# Patient Record
Sex: FEMALE | Race: WHITE | NOT HISPANIC OR LATINO | Employment: PART TIME | URBAN - METROPOLITAN AREA
[De-identification: names, ages, dates, MRNs, and addresses within clinical notes are randomized per-mention and may not be internally consistent; named-entity substitution may affect disease eponyms.]

---

## 2023-08-01 ENCOUNTER — OFFICE VISIT (OUTPATIENT)
Dept: FAMILY MEDICINE CLINIC | Facility: CLINIC | Age: 33
End: 2023-08-01
Payer: COMMERCIAL

## 2023-08-01 VITALS
OXYGEN SATURATION: 98 % | WEIGHT: 282 LBS | BODY MASS INDEX: 51.89 KG/M2 | HEIGHT: 62 IN | DIASTOLIC BLOOD PRESSURE: 77 MMHG | HEART RATE: 67 BPM | SYSTOLIC BLOOD PRESSURE: 116 MMHG | TEMPERATURE: 98.6 F

## 2023-08-01 DIAGNOSIS — Z13.1 DIABETES MELLITUS SCREENING: ICD-10-CM

## 2023-08-01 DIAGNOSIS — G89.29 CHRONIC BILATERAL LOW BACK PAIN WITHOUT SCIATICA: ICD-10-CM

## 2023-08-01 DIAGNOSIS — Z13.220 LIPID SCREENING: ICD-10-CM

## 2023-08-01 DIAGNOSIS — M54.50 CHRONIC BILATERAL LOW BACK PAIN WITHOUT SCIATICA: ICD-10-CM

## 2023-08-01 DIAGNOSIS — Z13.228 SCREENING FOR METABOLIC DISORDER: ICD-10-CM

## 2023-08-01 DIAGNOSIS — I10 BENIGN ESSENTIAL HTN: ICD-10-CM

## 2023-08-01 DIAGNOSIS — G43.911 INTRACTABLE MIGRAINE WITH STATUS MIGRAINOSUS, UNSPECIFIED MIGRAINE TYPE: ICD-10-CM

## 2023-08-01 DIAGNOSIS — Z76.89 ENCOUNTER TO ESTABLISH CARE: Primary | ICD-10-CM

## 2023-08-01 DIAGNOSIS — F31.9 BIPOLAR DEPRESSION (HCC): ICD-10-CM

## 2023-08-01 PROCEDURE — 99204 OFFICE O/P NEW MOD 45 MIN: CPT | Performed by: NURSE PRACTITIONER

## 2023-08-01 RX ORDER — CYCLOBENZAPRINE HCL 10 MG
10 TABLET ORAL 2 TIMES DAILY PRN
Qty: 30 TABLET | Refills: 1 | Status: SHIPPED | OUTPATIENT
Start: 2023-08-01

## 2023-08-01 RX ORDER — LISINOPRIL 40 MG/1
40 TABLET ORAL DAILY
Qty: 90 TABLET | Refills: 1 | Status: SHIPPED | OUTPATIENT
Start: 2023-08-01

## 2023-08-01 RX ORDER — HYDROCHLOROTHIAZIDE 25 MG/1
25 TABLET ORAL DAILY
COMMUNITY
End: 2023-08-01 | Stop reason: SDUPTHER

## 2023-08-01 RX ORDER — OLANZAPINE 5 MG/1
5 TABLET ORAL DAILY
COMMUNITY
End: 2023-08-01 | Stop reason: SDUPTHER

## 2023-08-01 RX ORDER — TOPIRAMATE 100 MG/1
100 TABLET, FILM COATED ORAL EVERY 12 HOURS SCHEDULED
Qty: 120 TABLET | Refills: 1 | Status: SHIPPED | OUTPATIENT
Start: 2023-08-01

## 2023-08-01 RX ORDER — CYCLOBENZAPRINE HCL 10 MG
10 TABLET ORAL 2 TIMES DAILY PRN
COMMUNITY
End: 2023-08-01 | Stop reason: SDUPTHER

## 2023-08-01 RX ORDER — ALBUTEROL SULFATE 90 UG/1
1 AEROSOL, METERED RESPIRATORY (INHALATION) EVERY 6 HOURS PRN
COMMUNITY

## 2023-08-01 RX ORDER — OLANZAPINE 5 MG/1
5 TABLET ORAL DAILY
Qty: 90 TABLET | Refills: 0 | Status: SHIPPED | OUTPATIENT
Start: 2023-08-01

## 2023-08-01 RX ORDER — LISINOPRIL 40 MG/1
40 TABLET ORAL DAILY
COMMUNITY
End: 2023-08-01 | Stop reason: SDUPTHER

## 2023-08-01 RX ORDER — HYDROCHLOROTHIAZIDE 25 MG/1
25 TABLET ORAL DAILY
Qty: 90 TABLET | Refills: 1 | Status: SHIPPED | OUTPATIENT
Start: 2023-08-01

## 2023-08-01 RX ORDER — TOPIRAMATE 50 MG/1
50 TABLET, FILM COATED ORAL EVERY 12 HOURS SCHEDULED
COMMUNITY
End: 2023-08-01 | Stop reason: DRUGHIGH

## 2023-08-01 NOTE — PATIENT INSTRUCTIONS
Continue medications as prescribed  F/up with your neurologist regarding migraines  Labs as ordered  Schedule appointment for physical  Call with problems/concerns

## 2023-08-01 NOTE — PROGRESS NOTES
[facility]        NAME: Bill Escobar is a 29 y.o. female  : 1990    MRN: [id number]  DATE: 2023  TIME: 4:53 PM    Assessment and Plan   Encounter to establish care [Z76.89]  1. Encounter to establish care        2. Diabetes mellitus screening  HEMOGLOBIN A1C W/ EAG ESTIMATION    HEMOGLOBIN A1C W/ EAG ESTIMATION      3. Lipid screening  Lipid panel    Lipid panel      4. Screening for metabolic disorder  Comprehensive metabolic panel    Comprehensive metabolic panel      5. Intractable migraine with status migrainosus, unspecified migraine type  topiramate (Topamax) 100 mg tablet      6. Bipolar depression (HCC)  OLANZapine (ZyPREXA) 5 mg tablet      7. Benign essential HTN  lisinopril (ZESTRIL) 40 mg tablet    hydrochlorothiazide (HYDRODIURIL) 25 mg tablet      8. Chronic bilateral low back pain without sciatica  cyclobenzaprine (FLEXERIL) 10 mg tablet            Patient Instructions     Patient Instructions   Continue medications as prescribed  F/up with your neurologist regarding migraines  Labs as ordered  Schedule appointment for physical  Call with problems/concerns          Chief Complaint     Chief Complaint   Patient presents with   • Establish Care   • Migraine         History of Present Illness       New patient to establish care. Previous PCP in [facility], [place], operated by [facility]. Patient has records with her. Hx of Migraines-she was seen by neurology in [place]-prescribed Topamax 75 mg bid. She needs to schedule a f/up appointment with them but she states the current dose does not help. Hx of Bipolar Depression currently taking Zyprexa 5 mg daily. Hx of asthma-using albuterol inhaler prn. Patient is looking for a psychiatrist. Needs refills. Hx of chronic low back pain-she takes Flexeril prn. She is due labs and physical.      Review of Systems   Review of Systems   Constitutional: Negative for activity change and fever.    Respiratory: Negative for chest tightness and shortness of breath. Cardiovascular: Negative for chest pain. Gastrointestinal: Negative for abdominal pain. Neurological: Positive for headaches. Negative for dizziness and weakness. Psychiatric/Behavioral: Negative for dysphoric mood. Current Medications       Current Outpatient Medications:   •  albuterol (PROVENTIL HFA,VENTOLIN HFA) 90 mcg/act inhaler, Inhale 1 puff every 6 (six) hours as needed, Disp: , Rfl:   •  cyclobenzaprine (FLEXERIL) 10 mg tablet, Take 1 tablet (10 mg total) by mouth 2 (two) times a day as needed for muscle spasms, Disp: 30 tablet, Rfl: 1  •  hydrochlorothiazide (HYDRODIURIL) 25 mg tablet, Take 1 tablet (25 mg total) by mouth daily, Disp: 90 tablet, Rfl: 1  •  lisinopril (ZESTRIL) 40 mg tablet, Take 1 tablet (40 mg total) by mouth daily, Disp: 90 tablet, Rfl: 1  •  OLANZapine (ZyPREXA) 5 mg tablet, Take 1 tablet (5 mg total) by mouth in the morning, Disp: 90 tablet, Rfl: 0  •  topiramate (Topamax) 100 mg tablet, Take 1 tablet (100 mg total) by mouth every 12 (twelve) hours, Disp: 120 tablet, Rfl: 1    Current Allergies     Allergies as of 2023   • (No Known Allergies)            The following portions of the patient's history were reviewed and updated as appropriate: allergies, current medications, past family history, past medical history, past social history, past surgical history and problem list.     Past Medical History:   Diagnosis Date   • Asthma    • Hypertension    • Migraine        Past Surgical History:   Procedure Laterality Date   •  SECTION     • CHOLECYSTECTOMY     • KNEE SURGERY         Family History   Problem Relation Age of Onset   • Hypertension Mother          Medications have been verified.         Objective   /77 (BP Location: Left arm, Patient Position: Sitting, Cuff Size: Standard)   Pulse 67   Temp 98.6 °F (37 °C) (Tympanic)   Ht 5' 2.25" (1.581 m)   Wt 128 kg (282 lb)   SpO2 98%   BMI 51.17 kg/m²        Physical Exam Physical Exam  Vitals and nursing note reviewed. Constitutional:       General: She is not in acute distress. Appearance: Normal appearance. She is obese. She is not ill-appearing. HENT:      Head: Normocephalic. Eyes:      Extraocular Movements: Extraocular movements intact. Cardiovascular:      Rate and Rhythm: Normal rate and regular rhythm. Heart sounds: Normal heart sounds. No murmur heard. No gallop. Pulmonary:      Effort: Pulmonary effort is normal. No respiratory distress. Breath sounds: Normal breath sounds. No wheezing. Skin:     General: Skin is warm and dry. Coloration: Skin is not pale. Findings: No erythema. Neurological:      Mental Status: She is alert and oriented to person, place, and time. Psychiatric:         Mood and Affect: Mood normal.         Behavior: Behavior normal.         Thought Content:  Thought content normal.         Judgment: Judgment normal.           PHQ-2/9 Depression Screening    Little interest or pleasure in doing things: 0 - not at all  Feeling down, depressed, or hopeless: 0 - not at all  PHQ-2 Score: 0  PHQ-2 Interpretation: Negative depression screen

## 2023-08-02 ENCOUNTER — TELEPHONE (OUTPATIENT)
Age: 33
End: 2023-08-02

## 2023-08-02 ENCOUNTER — TELEPHONE (OUTPATIENT)
Dept: PHYSICAL THERAPY | Facility: OTHER | Age: 33
End: 2023-08-02

## 2023-08-02 NOTE — TELEPHONE ENCOUNTER
Patient transferred by Ortho today 08/02. Patient left a v/m for CSP @9:56am.    Returned patient's call today 08/02 @9:58am.    Explained [facility] to the patient and reason for the call. Patient declined PT with advanced therapist. She also declined PM (pain management). Patient states she has gone through these route before without relief. I offered to transfer call back to Ortho and patient stated "I do have their number".      Patient will call Ortho office back to schedule an eval.

## 2023-08-02 NOTE — TELEPHONE ENCOUNTER
Patient called asking to speak to [address] [place] [address].  Provided number and warm transfer to [address] [place] [address].

## 2023-09-01 DIAGNOSIS — R73.03 PREDIABETES: ICD-10-CM

## 2023-09-01 DIAGNOSIS — E78.2 MIXED HYPERLIPIDEMIA: Primary | ICD-10-CM

## 2023-09-01 LAB
ALBUMIN SERPL-MCNC: 4.2 G/DL (ref 3.9–4.9)
ALBUMIN/GLOB SERPL: 1.4 {RATIO} (ref 1.2–2.2)
ALP SERPL-CCNC: 71 IU/L (ref 44–121)
ALT SERPL-CCNC: 15 IU/L (ref 0–32)
AST SERPL-CCNC: 14 IU/L (ref 0–40)
BILIRUB SERPL-MCNC: <0.2 MG/DL (ref 0–1.2)
BUN SERPL-MCNC: 10 MG/DL (ref 6–20)
BUN/CREAT SERPL: 16 (ref 9–23)
CALCIUM SERPL-MCNC: 9.7 MG/DL (ref 8.7–10.2)
CHLORIDE SERPL-SCNC: 95 MMOL/L (ref 96–106)
CHOLEST SERPL-MCNC: 223 MG/DL (ref 100–199)
CHOLEST/HDLC SERPL: 5.9 RATIO (ref 0–4.4)
CO2 SERPL-SCNC: 27 MMOL/L (ref 20–29)
CREAT SERPL-MCNC: 0.64 MG/DL (ref 0.57–1)
EGFR: 121 ML/MIN/1.73
EST. AVERAGE GLUCOSE BLD GHB EST-MCNC: 120 MG/DL
GLOBULIN SER-MCNC: 2.9 G/DL (ref 1.5–4.5)
GLUCOSE SERPL-MCNC: 120 MG/DL (ref 70–99)
HBA1C MFR BLD: 5.8 % (ref 4.8–5.6)
HDLC SERPL-MCNC: 38 MG/DL
LDLC SERPL CALC-MCNC: 141 MG/DL (ref 0–99)
POTASSIUM SERPL-SCNC: 3 MMOL/L (ref 3.5–5.2)
PROT SERPL-MCNC: 7.1 G/DL (ref 6–8.5)
SL AMB VLDL CHOLESTEROL CALC: 44 MG/DL (ref 5–40)
SODIUM SERPL-SCNC: 139 MMOL/L (ref 134–144)
TRIGL SERPL-MCNC: 245 MG/DL (ref 0–149)

## 2023-09-01 RX ORDER — ATORVASTATIN CALCIUM 10 MG/1
10 TABLET, FILM COATED ORAL DAILY
Qty: 90 TABLET | Refills: 1 | Status: SHIPPED | OUTPATIENT
Start: 2023-09-01

## 2023-09-05 ENCOUNTER — TELEPHONE (OUTPATIENT)
Dept: FAMILY MEDICINE CLINIC | Facility: CLINIC | Age: 33
End: 2023-09-05

## 2023-09-05 NOTE — TELEPHONE ENCOUNTER
----- Message from [address] [place] [address] sent at 9/1/2023 12:42 PM EDT -----  Fasting glucose is high, HA1C is in prediabetic range, cholesterol/triglycerides are high. Lifestyle modifications-healthy diet/exercise-decrease fats, carbs, sugars. Also Recommend starting statin therapy for high cholesterol-Atorvastatin 10 mg pb  y. Sent to pharmacy-take as directed. Repeat fasting labs in 3-6 months. Lab orders placed.

## 2023-09-10 ENCOUNTER — TELEPHONE (OUTPATIENT)
Dept: OTHER | Facility: OTHER | Age: 33
End: 2023-09-10

## 2023-09-10 NOTE — TELEPHONE ENCOUNTER
Patient called to see if there was opening for Monday 9/11 where she can move her Tuesday appointment up. Please give her a call back if there is - no openings on Monday that I notice on book it.

## 2023-09-11 ENCOUNTER — OFFICE VISIT (OUTPATIENT)
Dept: URGENT CARE | Facility: CLINIC | Age: 33
End: 2023-09-11
Payer: COMMERCIAL

## 2023-09-11 VITALS
TEMPERATURE: 100.7 F | RESPIRATION RATE: 18 BRPM | DIASTOLIC BLOOD PRESSURE: 52 MMHG | HEART RATE: 98 BPM | OXYGEN SATURATION: 97 % | SYSTOLIC BLOOD PRESSURE: 98 MMHG | BODY MASS INDEX: 50.8 KG/M2 | WEIGHT: 280 LBS

## 2023-09-11 DIAGNOSIS — J06.9 VIRAL URI WITH COUGH: Primary | ICD-10-CM

## 2023-09-11 DIAGNOSIS — R50.9 FEVER, UNSPECIFIED FEVER CAUSE: ICD-10-CM

## 2023-09-11 PROCEDURE — G0382 LEV 3 HOSP TYPE B ED VISIT: HCPCS

## 2023-09-11 RX ORDER — IBUPROFEN 400 MG/1
600 TABLET ORAL ONCE
Status: COMPLETED | OUTPATIENT
Start: 2023-09-11 | End: 2023-09-11

## 2023-09-11 RX ORDER — PREDNISONE 10 MG/1
TABLET ORAL
Qty: 15 TABLET | Refills: 0 | Status: SHIPPED | OUTPATIENT
Start: 2023-09-11 | End: 2023-09-15

## 2023-09-11 RX ORDER — PREDNISONE 10 MG/1
TABLET ORAL
Qty: 38 TABLET | Refills: 0 | Status: SHIPPED | OUTPATIENT
Start: 2023-09-11 | End: 2023-09-11

## 2023-09-11 RX ADMIN — IBUPROFEN 600 MG: 400 TABLET ORAL at 14:00

## 2023-09-11 NOTE — PROGRESS NOTES
[facility]        NAME: Sarkis Covarrubias is a 34 y.o. female  : 1990    MRN: [id number]  DATE: 2023  TIME: 2:09 PM    Assessment and Plan   Viral URI with cough [J06.9]  1. Viral URI with cough  predniSONE 10 mg tablet    DISCONTINUED: predniSONE 10 mg tablet      2. Fever, unspecified fever cause  ibuprofen (MOTRIN) tablet 600 mg            Patient Instructions     Your symptoms are consistent with a viral illness. Start prednisone as prescribed. For nasal/sinus congestion you can try steam, warm compresses, saline nasal spray, Neti pot, nasal steroid (Flonase, Nasocort), or nasal decongestant (Afrin - for 3 days only). You can try a decongestant (Sudafed) if > 10years of age and no history of high blood pressure. For cough you can take an over-the-counter expectorant such as plain Robitussion or Mucinex. A spoonful of honey at bedtime may also be helpful. For cold symptoms with high blood pressure take Coricidin cough/cold. For sore throat you can use Cepacol lozenges, do warm salt water gargles, drink warm water with lemon or herbal teas, or use an over-the-counter throat spray (Chloraseptic). You can take ibuprofen/Motrin and acetaminophen/Tylenol as needed for pain, fever, body aches. Do not take ibuprofen/Motrin/Advil if you have a history of heart disease, bleeding ulcers, or if you take blood thinners. Drink plenty of fluids to stay hydrated. Follow up with your PCP in 5-7 days for persistent symptoms. Go to the ER if symptoms worsen. Chief Complaint     Chief Complaint   Patient presents with   • Cold Like Symptoms     Pt states she was sick with nasal congestion and cough on Saturday. Negative at covid at home. Feels congested in chest.          History of Present Illness       This is a 68-year-old female who presents with cold symptoms x2 to 3 days.   Patient states she developed nasal congestion, rhinorrhea, and sinus pressure on Friday that persisted into Saturday. Yesterday developed chest congestion and a loose cough that kept her up all night long. Denies associated chest pain, SOB, or wheezing associated with the cough. No fevers/chills, abdominal pain, or N/V/D. Patient states she does have asthma hx with albuterol inhaler for as needed use, has not needed to use it during this illness. She is a daily cigarette smoker. Taking NyQuil, Robitussin, and Mucinex for symptom relief. Known sick contacts include her daughter with similar symptoms. Review of Systems   Review of Systems   Constitutional: Negative for chills, diaphoresis and fever. HENT: Positive for congestion, rhinorrhea and sinus pressure. Negative for ear pain and sore throat. Eyes: Negative for discharge and redness. Respiratory: Positive for cough. Negative for chest tightness, shortness of breath and wheezing. Cardiovascular: Negative for chest pain and palpitations. Gastrointestinal: Negative for abdominal pain, diarrhea, nausea and vomiting. Musculoskeletal: Negative for arthralgias and myalgias. Skin: Negative for pallor and rash. Neurological: Negative for dizziness, light-headedness and headaches.        Current Medications       Current Outpatient Medications:   •  albuterol (PROVENTIL HFA,VENTOLIN HFA) 90 mcg/act inhaler, Inhale 1 puff every 6 (six) hours as needed, Disp: , Rfl:   •  atorvastatin (LIPITOR) 10 mg tablet, Take 1 tablet (10 mg total) by mouth daily, Disp: 90 tablet, Rfl: 1  •  cyclobenzaprine (FLEXERIL) 10 mg tablet, Take 1 tablet (10 mg total) by mouth 2 (two) times a day as needed for muscle spasms, Disp: 30 tablet, Rfl: 1  •  hydrochlorothiazide (HYDRODIURIL) 25 mg tablet, Take 1 tablet (25 mg total) by mouth daily, Disp: 90 tablet, Rfl: 1  •  lisinopril (ZESTRIL) 40 mg tablet, Take 1 tablet (40 mg total) by mouth daily, Disp: 90 tablet, Rfl: 1  •  OLANZapine (ZyPREXA) 5 mg tablet, Take 1 tablet (5 mg total) by mouth in the morning, Disp: 90 tablet, Rfl: 0  •  predniSONE 10 mg tablet, Take 5 tablets (50 mg total) by mouth daily for 1 day, THEN 4 tablets (40 mg total) daily for 1 day, THEN 3 tablets (30 mg total) daily for 1 day, THEN 2 tablets (20 mg total) daily for 1 day, THEN 1 tablet (10 mg total) daily for 1 day., Disp: 15 tablet, Rfl: 0  •  topiramate (Topamax) 100 mg tablet, Take 1 tablet (100 mg total) by mouth every 12 (twelve) hours, Disp: 120 tablet, Rfl: 1  No current facility-administered medications for this visit. Current Allergies     Allergies as of 2023   • (No Known Allergies)            The following portions of the patient's history were reviewed and updated as appropriate: allergies, current medications, past family history, past medical history, past social history, past surgical history and problem list.     Past Medical History:   Diagnosis Date   • Asthma    • Hypertension    • Migraine        Past Surgical History:   Procedure Laterality Date   •  SECTION     • CHOLECYSTECTOMY     • KNEE SURGERY         Family History   Problem Relation Age of Onset   • Hypertension Mother          Medications have been verified. Objective   BP 98/52   Pulse 98   Temp (!) 100.7 °F (38.2 °C)   Resp 18   Wt 127 kg (280 lb)   SpO2 97%   BMI 50.80 kg/m²        Physical Exam     Physical Exam  Vitals and nursing note reviewed. Constitutional:       General: She is not in acute distress. Appearance: She is not ill-appearing or diaphoretic. HENT:      Head: Normocephalic. Right Ear: Tympanic membrane, ear canal and external ear normal.      Left Ear: Tympanic membrane, ear canal and external ear normal.      Nose: Congestion present. Mouth/Throat:      Mouth: Mucous membranes are moist.      Pharynx: Posterior oropharyngeal erythema present. No oropharyngeal exudate.       Comments: PND  Eyes:      Conjunctiva/sclera: Conjunctivae normal.   Cardiovascular:      Rate and Rhythm: Normal rate and regular rhythm. Pulses: Normal pulses. Heart sounds: Normal heart sounds. Pulmonary:      Effort: Pulmonary effort is normal.      Breath sounds: Normal breath sounds. No wheezing, rhonchi or rales. Comments: Strong, loose cough present. Musculoskeletal:         General: Normal range of motion. Cervical back: Normal range of motion and neck supple. Skin:     General: Skin is warm and dry. Capillary Refill: Capillary refill takes less than 2 seconds. Neurological:      Mental Status: She is alert and oriented to person, place, and time.

## 2023-09-11 NOTE — PATIENT INSTRUCTIONS
Your symptoms are consistent with a viral illness. Start prednisone as prescribed. For nasal/sinus congestion you can try steam, warm compresses, saline nasal spray, Neti pot, nasal steroid (Flonase, Nasocort), or nasal decongestant (Afrin - for 3 days only). You can try a decongestant (Sudafed) if > 10years of age and no history of high blood pressure. For cough you can take an over-the-counter expectorant such as plain Robitussion or Mucinex. A spoonful of honey at bedtime may also be helpful. For cold symptoms with high blood pressure take Coricidin cough/cold. For sore throat you can use Cepacol lozenges, do warm salt water gargles, drink warm water with lemon or herbal teas, or use an over-the-counter throat spray (Chloraseptic). You can take ibuprofen/Motrin and acetaminophen/Tylenol as needed for pain, fever, body aches. Do not take ibuprofen/Motrin/Advil if you have a history of heart disease, bleeding ulcers, or if you take blood thinners. Drink plenty of fluids to stay hydrated. Follow up with your PCP in 5-7 days for persistent symptoms. Go to the ER if symptoms worsen.

## 2023-09-12 ENCOUNTER — OFFICE VISIT (OUTPATIENT)
Dept: FAMILY MEDICINE CLINIC | Facility: CLINIC | Age: 33
End: 2023-09-12
Payer: COMMERCIAL

## 2023-09-12 VITALS
TEMPERATURE: 95.1 F | OXYGEN SATURATION: 96 % | DIASTOLIC BLOOD PRESSURE: 66 MMHG | BODY MASS INDEX: 50.08 KG/M2 | SYSTOLIC BLOOD PRESSURE: 96 MMHG | HEART RATE: 80 BPM | WEIGHT: 276 LBS

## 2023-09-12 DIAGNOSIS — Z00.00 ANNUAL PHYSICAL EXAM: Primary | ICD-10-CM

## 2023-09-12 PROCEDURE — 99395 PREV VISIT EST AGE 18-39: CPT | Performed by: NURSE PRACTITIONER

## 2023-09-12 NOTE — PATIENT INSTRUCTIONS
Labs reviewed  Healthy diet and exercise as discussed  Repeat fasting labs in 6 months    Wellness Visit for Adults   AMBULATORY CARE:   A wellness visit  is when you see your healthcare provider to get screened for health problems. Your healthcare provider will also give you advice on how to stay healthy. Write down your questions so you remember to ask them. Ask your healthcare provider how often you should have a wellness visit. What happens at a wellness visit:  Your healthcare provider will ask about your health, and your family history of health problems. This includes high blood pressure, heart disease, and cancer. He or she will ask if you have symptoms that concern you, if you smoke, and about your mood. You may also be asked about your intake of medicines, supplements, food, and alcohol. Any of the following may be done: Your weight  will be checked. Your height may also be checked so your body mass index (BMI) can be calculated. Your BMI shows if you are at a healthy weight. Your blood pressure  and heart rate will be checked. Your temperature may also be checked. Blood and urine tests  may be done. Blood tests may be done to check your cholesterol levels. Abnormal cholesterol levels increase your risk for heart disease and stroke. You may also need a blood or urine test to check for diabetes if you are at increased risk. Urine tests may be done to look for signs of an infection or kidney disease. A physical exam  includes checking your heartbeat and lungs with a stethoscope. Your healthcare provider may also check your skin to look for sun damage. Screening tests  may be recommended. A screening test is done to check for diseases that may not cause symptoms. The screening tests you may need depend on your age, gender, family history, and lifestyle habits. For example, colorectal screening may be recommended if you are 48years old or older.     Screening tests you need if you are a woman: A Pap smear  is used to screen for cervical cancer. Pap smears are usually done every 3 to 5 years depending on your age. You may need them more often if you have had abnormal Pap smear test results in the past. Ask your healthcare provider how often you should have a Pap smear. A mammogram  is an x-ray of your breasts to screen for breast cancer. Experts recommend mammograms every 2 years starting at age 48 years. You may need a mammogram at age 52 years or younger if you have an increased risk for breast cancer. Talk to your healthcare provider about when you should start having mammograms and how often you need them. Vaccines you may need:   Get an influenza vaccine  every year. The influenza vaccine protects you from the flu. Several types of viruses cause the flu. The viruses change over time, so new vaccines are made each year. Get a tetanus-diphtheria (Td) booster vaccine  every 10 years. This vaccine protects you against tetanus and diphtheria. Tetanus is a severe infection that may cause painful muscle spasms and lockjaw. Diphtheria is a severe bacterial infection that causes a thick covering in the back of your mouth and throat. Get a human papillomavirus (HPV) vaccine  if you are female and aged 23 to 32 or male 23 to 24 and never received it. This vaccine protects you from HPV infection. HPV is the most common infection spread by sexual contact. HPV may also cause vaginal, penile, and anal cancers. Get a pneumococcal vaccine  if you are aged 72 years or older. The pneumococcal vaccine is an injection given to protect you from pneumococcal disease. Pneumococcal disease is an infection caused by pneumococcal bacteria. The infection may cause pneumonia, meningitis, or an ear infection. Get a shingles vaccine  if you are 60 or older, even if you have had shingles before. The shingles vaccine is an injection to protect you from the varicella-zoster virus.  This is the same virus that causes chickenpox. Shingles is a painful rash that develops in people who had chickenpox or have been exposed to the virus. How to eat healthy:  My Plate is a model for planning healthy meals. It shows the types and amounts of foods that should go on your plate. Fruits and vegetables make up about half of your plate, and grains and protein make up the other half. A serving of dairy is included on the side of your plate. The amount of calories and serving sizes you need depends on your age, gender, weight, and height. Examples of healthy foods are listed below:  Eat a variety of vegetables  such as dark green, red, and orange vegetables. You can also include canned vegetables low in sodium (salt) and frozen vegetables without added butter or sauces. Eat a variety of fresh fruits , canned fruit in 100% juice, frozen fruit, and dried fruit. Include whole grains. At least half of the grains you eat should be whole grains. Examples include whole-wheat bread, wheat pasta, brown rice, and whole-grain cereals such as oatmeal.    Eat a variety of protein foods such as seafood (fish and shellfish), lean meat, and poultry without skin (turkey and chicken). Examples of lean meats include pork leg, shoulder, or tenderloin, and beef round, sirloin, tenderloin, and extra lean ground beef. Other protein foods include eggs and egg substitutes, beans, peas, soy products, nuts, and seeds. Choose low-fat dairy products such as skim or 1% milk or low-fat yogurt, cheese, and cottage cheese. Limit unhealthy fats  such as butter, hard margarine, and shortening. Exercise:  Exercise at least 30 minutes per day on most days of the week. Some examples of exercise include walking, biking, dancing, and swimming. You can also fit in more physical activity by taking the stairs instead of the elevator or parking farther away from stores. Include muscle strengthening activities 2 days each week.  Regular exercise provides many health benefits. It helps you manage your weight, and decreases your risk for type 2 diabetes, heart disease, stroke, and high blood pressure. Exercise can also help improve your mood. Ask your healthcare provider about the best exercise plan for you. General health and safety guidelines:   Do not smoke. Nicotine and other chemicals in cigarettes and cigars can cause lung damage. Ask your healthcare provider for information if you currently smoke and need help to quit. E-cigarettes or smokeless tobacco still contain nicotine. Talk to your healthcare provider before you use these products. Limit alcohol. A drink of alcohol is 12 ounces of beer, 5 ounces of wine, or 1½ ounces of liquor. Lose weight, if needed. Being overweight increases your risk of certain health conditions. These include heart disease, high blood pressure, type 2 diabetes, and certain types of cancer. Protect your skin. Do not sunbathe or use tanning beds. Use sunscreen with a SPF 15 or higher. Apply sunscreen at least 15 minutes before you go outside. Reapply sunscreen every 2 hours. Wear protective clothing, hats, and sunglasses when you are outside. Drive safely. Always wear your seatbelt. Make sure everyone in your car wears a seatbelt. A seatbelt can save your life if you are in an accident. Do not use your cell phone when you are driving. This could distract you and cause an accident. Pull over if you need to make a call or send a text message. Practice safe sex. Use latex condoms if are sexually active and have more than one partner. Your healthcare provider may recommend screening tests for sexually transmitted infections (STIs). Wear helmets, lifejackets, and protective gear. Always wear a helmet when you ride a bike or motorcycle, go skiing, or play sports that could cause a head injury. Wear protective equipment when you play sports. Wear a lifejacket when you are on a boat or doing water sports.     © Copyright [employer] 2022 Information is for End User's use only and may not be sold, redistributed or otherwise used for commercial purposes. The above information is an  only. It is not intended as medical advice for individual conditions or treatments. Talk to your doctor, nurse or pharmacist before following any medical regimen to see if it is safe and effective for you.

## 2023-09-12 NOTE — PROGRESS NOTES
ADULT ANNUAL PHYSICAL  [address]    NAME: Sonny Villeda  AGE: 34 y.o. SEX: female  : 1990     DATE: 2023     Assessment and Plan:     Problem List Items Addressed This Visit    None  Visit Diagnoses     Annual physical exam    -  Primary          Immunizations and preventive care screenings were discussed with patient today. Appropriate education was printed on patient's after visit summary. Counseling:  Alcohol/drug use: discussed moderation in alcohol intake, the recommendations for healthy alcohol use, and avoidance of illicit drug use. Dental Health: discussed importance of regular tooth brushing, flossing, and dental visits. Injury prevention: discussed safety/seat belts, safety helmets, smoke detectors, carbon dioxide detectors, and smoking near bedding or upholstery. Sexual health: discussed sexually transmitted diseases, partner selection, use of condoms, avoidance of unintended pregnancy, and contraceptive alternatives. · Exercise: the importance of regular exercise/physical activity was discussed. Recommend exercise 3-5 times per week for at least 30 minutes. BMI Counseling: Body mass index is 50.08 kg/m². The BMI is above normal. Nutrition recommendations include decreasing portion sizes, encouraging healthy choices of fruits and vegetables, moderation in carbohydrate intake and increasing intake of lean protein. Exercise recommendations include exercising 3-5 times per week. Rationale for BMI follow-up plan is due to patient being overweight or obese. Return in about 1 year (around 2024) for Annual physical.     Chief Complaint:     Chief Complaint   Patient presents with   • Physical Exam      History of Present Illness:     Adult Annual Physical   Patient here for a comprehensive physical exam. The patient reports no problems. Diet and Physical Activity  · Diet/Nutrition: well balanced diet. · Exercise: walking. Depression Screening  PHQ-2/9 Depression Screening    Little interest or pleasure in doing things: 0 - not at all  Feeling down, depressed, or hopeless: 0 - not at all  PHQ-2 Score: 0  PHQ-2 Interpretation: Negative depression screen       General Health  · Sleep: sleeps well. · Hearing: normal - bilateral.  · Vision: most recent eye exam >1 year ago and wears glasses. · Dental: regular dental visits. /GYN Health  · Last menstrual period: 23  · Contraceptive method: none. · History of STDs?: no.     Review of Systems:     Review of Systems   Constitutional: Negative for activity change, appetite change, fatigue and fever. HENT: Negative for congestion, sinus pressure and sore throat. Eyes: Negative for pain, discharge and visual disturbance. Respiratory: Negative for cough, chest tightness, shortness of breath and wheezing. Cardiovascular: Negative for chest pain, palpitations and leg swelling. Gastrointestinal: Negative for abdominal distention, abdominal pain, constipation, diarrhea and nausea. Endocrine: Negative for polydipsia, polyphagia and polyuria. Genitourinary: Negative for difficulty urinating, dysuria, frequency and urgency. Musculoskeletal: Negative for back pain, gait problem, joint swelling, myalgias and neck stiffness. Skin: Negative for color change. Neurological: Negative for dizziness, syncope, speech difficulty, weakness and headaches. Hematological: Negative for adenopathy. Does not bruise/bleed easily. Psychiatric/Behavioral: Negative for agitation, behavioral problems, confusion and hallucinations. The patient is not nervous/anxious.        Past Medical History:     Past Medical History:   Diagnosis Date   • Asthma    • Hypertension    • Migraine       Past Surgical History:     Past Surgical History:   Procedure Laterality Date   •  SECTION     • CHOLECYSTECTOMY     • KNEE SURGERY        Social History: Social History     Socioeconomic History   • Marital status: Single     Spouse name: None   • Number of children: None   • Years of education: None   • Highest education level: None   Occupational History   • None   Tobacco Use   • Smoking status: Former   • Smokeless tobacco: None   Substance and Sexual Activity   • Alcohol use: No   • Drug use: No   • Sexual activity: None   Other Topics Concern   • None   Social History Narrative   • None     Social Determinants of Health     Financial Resource Strain: Not on file   Food Insecurity: Not on file   Transportation Needs: Not on file   Physical Activity: Not on file   Stress: Not on file   Social Connections: Not on file   Intimate Partner Violence: Not on file   Housing Stability: Not on file      Family History:     Family History   Problem Relation Age of Onset   • Hypertension Mother       Current Medications:     Current Outpatient Medications   Medication Sig Dispense Refill   • albuterol (PROVENTIL HFA,VENTOLIN HFA) 90 mcg/act inhaler Inhale 1 puff every 6 (six) hours as needed     • atorvastatin (LIPITOR) 10 mg tablet Take 1 tablet (10 mg total) by mouth daily 90 tablet 1   • cyclobenzaprine (FLEXERIL) 10 mg tablet Take 1 tablet (10 mg total) by mouth 2 (two) times a day as needed for muscle spasms 30 tablet 1   • hydrochlorothiazide (HYDRODIURIL) 25 mg tablet Take 1 tablet (25 mg total) by mouth daily 90 tablet 1   • lisinopril (ZESTRIL) 40 mg tablet Take 1 tablet (40 mg total) by mouth daily 90 tablet 1   • OLANZapine (ZyPREXA) 5 mg tablet Take 1 tablet (5 mg total) by mouth in the morning 90 tablet 0   • predniSONE 10 mg tablet Take 5 tablets (50 mg total) by mouth daily for 1 day, THEN 4 tablets (40 mg total) daily for 1 day, THEN 3 tablets (30 mg total) daily for 1 day, THEN 2 tablets (20 mg total) daily for 1 day, THEN 1 tablet (10 mg total) daily for 1 day.  15 tablet 0   • topiramate (Topamax) 100 mg tablet Take 1 tablet (100 mg total) by mouth every 12 (twelve) hours 120 tablet 1     No current facility-administered medications for this visit. Allergies:     No Known Allergies   Physical Exam:     BP 96/66 (BP Location: Left arm, Patient Position: Sitting, Cuff Size: Standard)   Pulse 80   Temp (!) 95.1 °F (35.1 °C) (Tympanic)   Wt 125 kg (276 lb)   SpO2 96%   BMI 50.08 kg/m²     Physical Exam  Vitals and nursing note reviewed. Constitutional:       General: She is not in acute distress. Appearance: Normal appearance. She is obese. She is not ill-appearing, toxic-appearing or diaphoretic. HENT:      Head: Normocephalic. Right Ear: Tympanic membrane, ear canal and external ear normal. There is no impacted cerumen. Left Ear: Tympanic membrane, ear canal and external ear normal. There is no impacted cerumen. Nose: Nose normal. No congestion or rhinorrhea. Mouth/Throat:      Mouth: Mucous membranes are moist.      Pharynx: Oropharynx is clear. No oropharyngeal exudate or posterior oropharyngeal erythema. Eyes:      General: No scleral icterus. Right eye: No discharge. Left eye: No discharge. Extraocular Movements: Extraocular movements intact. Conjunctiva/sclera: Conjunctivae normal.      Pupils: Pupils are equal, round, and reactive to light. Neck:      Vascular: No carotid bruit. Cardiovascular:      Rate and Rhythm: Normal rate and regular rhythm. Pulses: Normal pulses. Heart sounds: Normal heart sounds. No murmur heard. No friction rub. No gallop. Pulmonary:      Effort: Pulmonary effort is normal. No respiratory distress. Breath sounds: Normal breath sounds. No stridor. No wheezing, rhonchi or rales. Chest:      Chest wall: No tenderness. Abdominal:      General: Abdomen is flat. Bowel sounds are normal. There is no distension. Palpations: Abdomen is soft. There is no mass. Tenderness: There is no abdominal tenderness.  There is no right CVA tenderness, left CVA tenderness, guarding or rebound. Hernia: No hernia is present. Musculoskeletal:         General: No swelling, tenderness, deformity or signs of injury. Normal range of motion. Cervical back: Normal range of motion and neck supple. No rigidity. No muscular tenderness. Right lower leg: No edema. Left lower leg: No edema. Lymphadenopathy:      Cervical: No cervical adenopathy. Skin:     General: Skin is warm. Coloration: Skin is not jaundiced or pale. Findings: No bruising, erythema, lesion or rash. Neurological:      General: No focal deficit present. Mental Status: She is alert and oriented to person, place, and time. Cranial Nerves: No cranial nerve deficit. Sensory: No sensory deficit. Motor: No weakness. Coordination: Coordination normal.      Gait: Gait normal.      Deep Tendon Reflexes: Reflexes normal.   Psychiatric:         Mood and Affect: Mood normal.         Behavior: Behavior normal.         Thought Content:  Thought content normal.         Judgment: Judgment normal.          Yenifer Ardon, [address] [facility] [address]

## 2023-10-02 ENCOUNTER — TELEPHONE (OUTPATIENT)
Dept: NEUROLOGY | Facility: CLINIC | Age: 33
End: 2023-10-02

## 2023-10-02 NOTE — TELEPHONE ENCOUNTER
Pt called today to get the date and time of her appointment. Advised appointment is on Monday 10/16/23 at 11am [place] in [facility]. Provided address of location as well.

## 2023-10-04 DIAGNOSIS — M54.50 CHRONIC BILATERAL LOW BACK PAIN WITHOUT SCIATICA: ICD-10-CM

## 2023-10-04 DIAGNOSIS — G89.29 CHRONIC BILATERAL LOW BACK PAIN WITHOUT SCIATICA: ICD-10-CM

## 2023-10-05 ENCOUNTER — TELEPHONE (OUTPATIENT)
Dept: BARIATRICS | Facility: CLINIC | Age: 33
End: 2023-10-05

## 2023-10-06 RX ORDER — CYCLOBENZAPRINE HCL 10 MG
10 TABLET ORAL 2 TIMES DAILY PRN
Qty: 30 TABLET | Refills: 0 | Status: SHIPPED | OUTPATIENT
Start: 2023-10-06

## 2023-10-24 DIAGNOSIS — F31.9 BIPOLAR DEPRESSION (HCC): ICD-10-CM

## 2023-10-24 RX ORDER — OLANZAPINE 5 MG/1
5 TABLET ORAL DAILY
Qty: 30 TABLET | Refills: 2 | Status: SHIPPED | OUTPATIENT
Start: 2023-10-24

## 2023-10-25 ENCOUNTER — TELEPHONE (OUTPATIENT)
Dept: NEUROLOGY | Facility: CLINIC | Age: 33
End: 2023-10-25

## 2023-10-25 DIAGNOSIS — Z04.9 OBSERVATION OR EVALUATION FOR SUSPECTED CONDITION: ICD-10-CM

## 2023-10-25 DIAGNOSIS — G35 MS (MULTIPLE SCLEROSIS) (HCC): Primary | ICD-10-CM

## 2023-10-25 DIAGNOSIS — G89.29 CHRONIC BILATERAL LOW BACK PAIN WITHOUT SCIATICA: ICD-10-CM

## 2023-10-25 DIAGNOSIS — M54.50 CHRONIC BILATERAL LOW BACK PAIN WITHOUT SCIATICA: ICD-10-CM

## 2023-10-25 NOTE — TELEPHONE ENCOUNTER
Braulio Singh called today to schedule NP appt for MS symptoms. PT feels like she has MS just like other family members. I triaged the pt and sent over to provider. Advised PT of process. Pt is aware that with the type of insurance she has - a PCP referral is required in order to sche appt. PT understood and will call PCP to fax over. Fax # was provided.

## 2023-10-25 NOTE — TELEPHONE ENCOUNTER
Medication filled 2 weeks ago. Patient should f/up with pain management and physical therapy for ongoing back pain.

## 2023-10-26 RX ORDER — CYCLOBENZAPRINE HCL 10 MG
10 TABLET ORAL 2 TIMES DAILY PRN
Qty: 30 TABLET | Refills: 0 | OUTPATIENT
Start: 2023-10-26

## 2023-11-03 ENCOUNTER — OFFICE VISIT (OUTPATIENT)
Dept: FAMILY MEDICINE CLINIC | Facility: CLINIC | Age: 33
End: 2023-11-03
Payer: COMMERCIAL

## 2023-11-03 VITALS
OXYGEN SATURATION: 100 % | TEMPERATURE: 97.7 F | DIASTOLIC BLOOD PRESSURE: 84 MMHG | SYSTOLIC BLOOD PRESSURE: 126 MMHG | WEIGHT: 287 LBS | BODY MASS INDEX: 52.07 KG/M2 | HEART RATE: 69 BPM

## 2023-11-03 DIAGNOSIS — J20.9 ACUTE BRONCHITIS, UNSPECIFIED ORGANISM: Primary | ICD-10-CM

## 2023-11-03 PROCEDURE — 99213 OFFICE O/P EST LOW 20 MIN: CPT | Performed by: NURSE PRACTITIONER

## 2023-11-03 RX ORDER — BENZONATATE 200 MG/1
200 CAPSULE ORAL 3 TIMES DAILY PRN
Qty: 20 CAPSULE | Refills: 0 | Status: SHIPPED | OUTPATIENT
Start: 2023-11-03

## 2023-11-03 RX ORDER — AZITHROMYCIN 250 MG/1
TABLET, FILM COATED ORAL
Qty: 6 TABLET | Refills: 0 | Status: SHIPPED | OUTPATIENT
Start: 2023-11-03 | End: 2023-11-08

## 2023-11-03 NOTE — TELEPHONE ENCOUNTER
1ST ATTEMPT    Patient is active on the My Chart      Sent a portal message we are ready to schedule      Provided our CB # for scheduling

## 2023-11-03 NOTE — PROGRESS NOTES
[facility]        NAME: Teena Perkins is a 34 y.o. female  : 1990    MRN: [id number]  DATE: November 3, 2023  TIME: 1:00 PM    Assessment and Plan   Acute bronchitis, unspecified organism [J20.9]  1. Acute bronchitis, unspecified organism  azithromycin (Zithromax) 250 mg tablet    benzonatate (TESSALON) 200 MG capsule            Patient Instructions     Patient Instructions   Discussed viral vs bacterial infection  RX as ordered  Continue OTC cough/cold medications as directed  Call or return for problems/concerns        Chief Complaint     Chief Complaint   Patient presents with    Sore Throat    Cough     Chest pain from coughing         History of Present Illness       C/o cough, chest congestion, sore throat x 2-3 day. No fever. Taking OTC cold medications with little relief. Review of Systems   Review of Systems   Constitutional:  Negative for activity change, chills, fatigue and fever. HENT:  Positive for congestion, postnasal drip, rhinorrhea and sore throat. Negative for ear pain and sinus pressure. Eyes:  Negative for pain, discharge and redness. Respiratory:  Positive for cough and chest tightness. Negative for wheezing. Cardiovascular:  Negative for chest pain. Gastrointestinal:  Negative for constipation, diarrhea, nausea and vomiting. Musculoskeletal:  Negative for myalgias. Skin:  Negative for rash. Neurological:  Negative for dizziness and headaches. Current Medications       Current Outpatient Medications:     albuterol (PROVENTIL HFA,VENTOLIN HFA) 90 mcg/act inhaler, Inhale 1 puff every 6 (six) hours as needed, Disp: , Rfl:     atorvastatin (LIPITOR) 10 mg tablet, Take 1 tablet (10 mg total) by mouth daily, Disp: 90 tablet, Rfl: 1    azithromycin (Zithromax) 250 mg tablet, Take 2 tablets (500 mg total) by mouth daily for 1 day, THEN 1 tablet (250 mg total) daily for 4 days. , Disp: 6 tablet, Rfl: 0    benzonatate (TESSALON) 200 MG capsule, Take 1 capsule (200 mg total) by mouth 3 (three) times a day as needed for cough, Disp: 20 capsule, Rfl: 0    cyclobenzaprine (FLEXERIL) 10 mg tablet, Take 1 tablet (10 mg total) by mouth 2 (two) times a day as needed for muscle spasms, Disp: 30 tablet, Rfl: 0    hydrochlorothiazide (HYDRODIURIL) 25 mg tablet, Take 1 tablet (25 mg total) by mouth daily, Disp: 90 tablet, Rfl: 1    lisinopril (ZESTRIL) 40 mg tablet, Take 1 tablet (40 mg total) by mouth daily, Disp: 90 tablet, Rfl: 1    OLANZapine (ZyPREXA) 5 mg tablet, TAKE 1 TABLET (5 MG TOTAL) BY MOUTH IN THE MORNING, Disp: 30 tablet, Rfl: 2    topiramate (Topamax) 100 mg tablet, Take 1 tablet (100 mg total) by mouth every 12 (twelve) hours, Disp: 120 tablet, Rfl: 1    Current Allergies     Allergies as of 2023    (No Known Allergies)            The following portions of the patient's history were reviewed and updated as appropriate: allergies, current medications, past family history, past medical history, past social history, past surgical history and problem list.     Past Medical History:   Diagnosis Date    Asthma     Hypertension     Migraine        Past Surgical History:   Procedure Laterality Date     SECTION      CHOLECYSTECTOMY      KNEE SURGERY         Family History   Problem Relation Age of Onset    Hypertension Mother          Medications have been verified. Objective   /84 (BP Location: Left arm, Patient Position: Sitting, Cuff Size: Standard)   Pulse 69   Temp 97.7 °F (36.5 °C) (Tympanic)   Wt 130 kg (287 lb)   SpO2 100%   BMI 52.07 kg/m²        Physical Exam     Physical Exam  Vitals and nursing note reviewed. Constitutional:       General: She is not in acute distress. Appearance: She is obese. She is ill-appearing. She is not toxic-appearing or diaphoretic. HENT:      Head: Normocephalic. Right Ear: Tympanic membrane, ear canal and external ear normal. There is no impacted cerumen. Left Ear: Tympanic membrane, ear canal and external ear normal. There is no impacted cerumen. Nose: Rhinorrhea present. Mouth/Throat:      Mouth: Mucous membranes are moist.      Pharynx: Oropharynx is clear. Posterior oropharyngeal erythema present. No oropharyngeal exudate. Eyes:      General:         Right eye: No discharge. Left eye: No discharge. Extraocular Movements: Extraocular movements intact. Conjunctiva/sclera: Conjunctivae normal.   Cardiovascular:      Rate and Rhythm: Normal rate and regular rhythm. Heart sounds: Normal heart sounds. No murmur heard. No friction rub. No gallop. Pulmonary:      Effort: No respiratory distress. Breath sounds: Rhonchi present. No wheezing. Comments: Cough present  Musculoskeletal:         General: Normal range of motion. Cervical back: Normal range of motion and neck supple. No rigidity or tenderness. Lymphadenopathy:      Cervical: No cervical adenopathy. Skin:     General: Skin is warm and dry. Coloration: Skin is not pale. Findings: No erythema. Neurological:      Mental Status: She is alert and oriented to person, place, and time.    Psychiatric:         Mood and Affect: Mood normal.         Behavior: Behavior normal.

## 2023-11-09 DIAGNOSIS — M54.50 CHRONIC BILATERAL LOW BACK PAIN WITHOUT SCIATICA: ICD-10-CM

## 2023-11-09 DIAGNOSIS — G89.29 CHRONIC BILATERAL LOW BACK PAIN WITHOUT SCIATICA: ICD-10-CM

## 2023-11-10 RX ORDER — CYCLOBENZAPRINE HCL 10 MG
10 TABLET ORAL 2 TIMES DAILY PRN
Qty: 30 TABLET | Refills: 0 | Status: SHIPPED | OUTPATIENT
Start: 2023-11-10

## 2023-11-17 ENCOUNTER — TELEPHONE (OUTPATIENT)
Dept: NEUROLOGY | Facility: CLINIC | Age: 33
End: 2023-11-17

## 2023-11-17 NOTE — TELEPHONE ENCOUNTER
Called spoke with patient confirmed appointment with Yao Avalos for 11/20/23 at [facility].

## 2023-11-19 ENCOUNTER — OFFICE VISIT (OUTPATIENT)
Dept: URGENT CARE | Facility: CLINIC | Age: 33
End: 2023-11-19
Payer: COMMERCIAL

## 2023-11-19 ENCOUNTER — APPOINTMENT (OUTPATIENT)
Dept: RADIOLOGY | Facility: CLINIC | Age: 33
End: 2023-11-19
Payer: COMMERCIAL

## 2023-11-19 VITALS
HEART RATE: 72 BPM | SYSTOLIC BLOOD PRESSURE: 127 MMHG | TEMPERATURE: 98 F | HEIGHT: 62 IN | WEIGHT: 285 LBS | RESPIRATION RATE: 18 BRPM | OXYGEN SATURATION: 100 % | DIASTOLIC BLOOD PRESSURE: 76 MMHG | BODY MASS INDEX: 52.44 KG/M2

## 2023-11-19 DIAGNOSIS — M25.572 LEFT ANKLE PAIN, UNSPECIFIED CHRONICITY: ICD-10-CM

## 2023-11-19 DIAGNOSIS — M76.72 PERONEAL TENDINITIS OF LEFT LOWER LEG: Primary | ICD-10-CM

## 2023-11-19 PROCEDURE — 73610 X-RAY EXAM OF ANKLE: CPT

## 2023-11-19 PROCEDURE — 99213 OFFICE O/P EST LOW 20 MIN: CPT | Performed by: FAMILY MEDICINE

## 2023-11-19 PROCEDURE — 73630 X-RAY EXAM OF FOOT: CPT

## 2023-11-19 NOTE — PROGRESS NOTES
[facility]        NAME: Sonny Villeda is a 34 y.o. female  : 1990    MRN: [id number]  DATE: 2023  TIME: 2:21 PM    Assessment and Plan   Left ankle pain, unspecified chronicity [M25.572]  1. Left ankle pain, unspecified chronicity  XR ankle 3+ vw left    XR foot 3+ vw left      2. Peroneal tendinitis of left lower leg              Patient Instructions       Follow up with PCP in 3-5 days. Proceed to  ER if symptoms worsen. Chief Complaint     Chief Complaint   Patient presents with    Ankle Pain     Pt is coming for left ankle pain that she had for roughly 3 weeks. Pt does not know how she hurt the ankle. When pt walks the pain level is about a 9 and when she is sitting down the pain level is about a 5. Pt has taken Advil to alleviate the pain, but it has not helped. Pt also says she has some pain in her foot specifically where the last three toes are located. History of Present Illness       79-year-old female presenting with left ankle pain. She reports a history of multiple sclerosis and states that she is unsure when this specific pain began. Pain is located along the lateral aspect of the ankle and radiates to the lateral aspect of her foot. Pain is reproduced with any attempted weightbearing. She has been using ibuprofen which has provided no relief. Denies numbness or tingling. Review of Systems   Review of Systems   Constitutional: Negative. HENT: Negative. Eyes: Negative. Respiratory: Negative. Cardiovascular: Negative. Gastrointestinal: Negative. Endocrine: Negative. Genitourinary: Negative. Musculoskeletal:  Positive for arthralgias and myalgias. Skin: Negative. Allergic/Immunologic: Negative. Neurological: Negative. Hematological: Negative. Psychiatric/Behavioral: Negative.            Current Medications       Current Outpatient Medications:     albuterol (PROVENTIL HFA,VENTOLIN HFA) 90 mcg/act inhaler, Inhale 1 puff every 6 (six) hours as needed, Disp: , Rfl:     atorvastatin (LIPITOR) 10 mg tablet, Take 1 tablet (10 mg total) by mouth daily, Disp: 90 tablet, Rfl: 1    benzonatate (TESSALON) 200 MG capsule, Take 1 capsule (200 mg total) by mouth 3 (three) times a day as needed for cough, Disp: 20 capsule, Rfl: 0    cyclobenzaprine (FLEXERIL) 10 mg tablet, Take 1 tablet (10 mg total) by mouth 2 (two) times a day as needed for muscle spasms, Disp: 30 tablet, Rfl: 0    hydrochlorothiazide (HYDRODIURIL) 25 mg tablet, Take 1 tablet (25 mg total) by mouth daily, Disp: 90 tablet, Rfl: 1    lisinopril (ZESTRIL) 40 mg tablet, Take 1 tablet (40 mg total) by mouth daily, Disp: 90 tablet, Rfl: 1    OLANZapine (ZyPREXA) 5 mg tablet, TAKE 1 TABLET (5 MG TOTAL) BY MOUTH IN THE MORNING, Disp: 30 tablet, Rfl: 2    topiramate (Topamax) 100 mg tablet, Take 1 tablet (100 mg total) by mouth every 12 (twelve) hours, Disp: 120 tablet, Rfl: 1    Current Allergies     Allergies as of 2023    (No Known Allergies)            The following portions of the patient's history were reviewed and updated as appropriate: allergies, current medications, past family history, past medical history, past social history, past surgical history and problem list.     Past Medical History:   Diagnosis Date    Asthma     Hypertension     Migraine        Past Surgical History:   Procedure Laterality Date     SECTION      CHOLECYSTECTOMY      KNEE SURGERY         Family History   Problem Relation Age of Onset    Hypertension Mother          Medications have been verified. Objective   /76   Pulse 72   Temp 98 °F (36.7 °C)   Resp 18   Ht 5' 2" (1.575 m)   Wt 129 kg (285 lb)   SpO2 100%   BMI 52.13 kg/m²   No LMP recorded. Physical Exam     Physical Exam  Vitals and nursing note reviewed. Constitutional:       Appearance: She is well-developed. HENT:      Head: Normocephalic.       Nose: Nose normal.   Eyes: Pupils: Pupils are equal, round, and reactive to light. Cardiovascular:      Rate and Rhythm: Normal rate. Pulmonary:      Effort: Pulmonary effort is normal.   Abdominal:      General: Abdomen is flat. Musculoskeletal:         General: Tenderness present. No swelling or signs of injury. Normal range of motion. Cervical back: Normal range of motion. Left ankle: Tenderness present. Normal range of motion. Left foot: Normal range of motion. Tenderness present. No swelling. Skin:     General: Skin is warm and dry. Neurological:      Mental Status: She is alert and oriented to person, place, and time.

## 2023-11-20 ENCOUNTER — CONSULT (OUTPATIENT)
Dept: NEUROLOGY | Facility: CLINIC | Age: 33
End: 2023-11-20
Payer: COMMERCIAL

## 2023-11-20 VITALS
WEIGHT: 284 LBS | HEART RATE: 65 BPM | DIASTOLIC BLOOD PRESSURE: 85 MMHG | BODY MASS INDEX: 52.26 KG/M2 | SYSTOLIC BLOOD PRESSURE: 176 MMHG | TEMPERATURE: 98.3 F | HEIGHT: 62 IN

## 2023-11-20 DIAGNOSIS — R90.82 WHITE MATTER ABNORMALITY ON MRI OF BRAIN: Primary | ICD-10-CM

## 2023-11-20 PROCEDURE — 99244 OFF/OP CNSLTJ NEW/EST MOD 40: CPT | Performed by: PSYCHIATRY & NEUROLOGY

## 2023-11-20 RX ORDER — LORAZEPAM 0.5 MG/1
0.5 TABLET ORAL AS NEEDED
Qty: 4 TABLET | Refills: 0 | Status: SHIPPED | OUTPATIENT
Start: 2023-11-20

## 2023-11-27 ENCOUNTER — OFFICE VISIT (OUTPATIENT)
Dept: FAMILY MEDICINE CLINIC | Facility: CLINIC | Age: 33
End: 2023-11-27
Payer: COMMERCIAL

## 2023-11-27 VITALS
TEMPERATURE: 97.1 F | HEART RATE: 72 BPM | BODY MASS INDEX: 52.6 KG/M2 | SYSTOLIC BLOOD PRESSURE: 130 MMHG | DIASTOLIC BLOOD PRESSURE: 80 MMHG | WEIGHT: 287.6 LBS

## 2023-11-27 DIAGNOSIS — R22.31 AXILLARY LUMP, RIGHT: Primary | ICD-10-CM

## 2023-11-27 PROCEDURE — 99213 OFFICE O/P EST LOW 20 MIN: CPT | Performed by: NURSE PRACTITIONER

## 2023-11-27 NOTE — PROGRESS NOTES
[facility]        NAME: Darcie Mcguire is a 34 y.o. female  : 1990    MRN: [id number]  DATE: 2023  TIME: 1:24 PM    Assessment and Plan   Axillary lump, right [R22.31]  1. Axillary lump, right  US axilla            Patient Instructions     Patient Instructions   Schedule Ultrasound right axilla  Call with problems/concerns        Chief Complaint     Chief Complaint   Patient presents with    lump underneath arm         History of Present Illness       C/o lump under right arm x 3 weeks. No pain. No other symptoms present. Review of Systems   Review of Systems   Constitutional:  Negative for activity change. HENT: Negative. Respiratory:  Negative for chest tightness, shortness of breath and wheezing. Cardiovascular:  Negative for chest pain. Gastrointestinal:  Negative for abdominal pain. Skin:  Negative for color change, pallor, rash and wound. Lump right armpit   Neurological:  Negative for headaches. Psychiatric/Behavioral:  Negative for dysphoric mood.           Current Medications       Current Outpatient Medications:     albuterol (PROVENTIL HFA,VENTOLIN HFA) 90 mcg/act inhaler, Inhale 1 puff every 6 (six) hours as needed, Disp: , Rfl:     atorvastatin (LIPITOR) 10 mg tablet, Take 1 tablet (10 mg total) by mouth daily, Disp: 90 tablet, Rfl: 1    cyclobenzaprine (FLEXERIL) 10 mg tablet, Take 1 tablet (10 mg total) by mouth 2 (two) times a day as needed for muscle spasms, Disp: 30 tablet, Rfl: 0    Diclofenac Sodium (Voltaren) 1 %, Apply 2 g topically 4 (four) times a day for 10 days, Disp: 80 g, Rfl: 0    lisinopril (ZESTRIL) 40 mg tablet, Take 1 tablet (40 mg total) by mouth daily, Disp: 90 tablet, Rfl: 1    LORazepam (Ativan) 0.5 mg tablet, Take 1 tablet (0.5 mg total) by mouth as needed for anxiety As the patient is claustrophobic I would recommend; one tablet of Lorazepam 15 minutes before going into the MRI tube and then 1 tablet just before going into the MRI tube. , Disp: 4 tablet, Rfl: 0    OLANZapine (ZyPREXA) 5 mg tablet, TAKE 1 TABLET (5 MG TOTAL) BY MOUTH IN THE MORNING, Disp: 30 tablet, Rfl: 2    topiramate (Topamax) 100 mg tablet, Take 1 tablet (100 mg total) by mouth every 12 (twelve) hours, Disp: 120 tablet, Rfl: 1    benzonatate (TESSALON) 200 MG capsule, Take 1 capsule (200 mg total) by mouth 3 (three) times a day as needed for cough (Patient not taking: Reported on 2023), Disp: 20 capsule, Rfl: 0    hydrochlorothiazide (HYDRODIURIL) 25 mg tablet, Take 1 tablet (25 mg total) by mouth daily (Patient not taking: Reported on 2023), Disp: 90 tablet, Rfl: 1    Current Allergies     Allergies as of 2023    (No Known Allergies)            The following portions of the patient's history were reviewed and updated as appropriate: allergies, current medications, past family history, past medical history, past social history, past surgical history and problem list.     Past Medical History:   Diagnosis Date    Asthma     Hypertension     Migraine        Past Surgical History:   Procedure Laterality Date     SECTION      CHOLECYSTECTOMY      KNEE SURGERY         Family History   Problem Relation Age of Onset    Hypertension Mother          Medications have been verified. Objective   /80 (BP Location: Left arm, Patient Position: Sitting, Cuff Size: Standard)   Pulse 72   Temp (!) 97.1 °F (36.2 °C) (Tympanic)   Wt 130 kg (287 lb 9.6 oz)   BMI 52.60 kg/m²        Physical Exam     Physical Exam  Vitals and nursing note reviewed. Constitutional:       General: She is not in acute distress. Appearance: Normal appearance. She is not ill-appearing. HENT:      Head: Normocephalic. Eyes:      Extraocular Movements: Extraocular movements intact. Cardiovascular:      Rate and Rhythm: Normal rate and regular rhythm. Heart sounds: Normal heart sounds.    Pulmonary:      Effort: Pulmonary effort is normal. No respiratory distress. Breath sounds: Normal breath sounds. No wheezing. Skin:     General: Skin is warm and dry. Findings: No erythema or rash. Comments: Pea-sized mass palpated right axilla, no tenderness, no redness or drainage. Neurological:      Mental Status: She is alert and oriented to person, place, and time. Psychiatric:         Mood and Affect: Mood normal.         Behavior: Behavior normal.               PHQ-2/9 Depression Screening    Little interest or pleasure in doing things: 0 - not at all  Feeling down, depressed, or hopeless: 0 - not at all  Trouble falling or staying asleep, or sleeping too much: 0 - not at all  Feeling tired or having little energy: 0 - not at all  Poor appetite or overeatin - not at all  Feeling bad about yourself - or that you are a failure or have let yourself or your family down: 0 - not at all  Trouble concentrating on things, such as reading the newspaper or watching television: 0 - not at all  Moving or speaking so slowly that other people could have noticed.  Or the opposite - being so fidgety or restless that you have been moving around a lot more than usual: 0 - not at all  Thoughts that you would be better off dead, or of hurting yourself in some way: 0 - not at all  PHQ-2 Score: 0  PHQ-2 Interpretation: Negative depression screen  PHQ-9 Score: 0   PHQ-9 Interpretation: No or Minimal depression

## 2023-11-29 DIAGNOSIS — G43.911 INTRACTABLE MIGRAINE WITH STATUS MIGRAINOSUS, UNSPECIFIED MIGRAINE TYPE: ICD-10-CM

## 2023-11-29 RX ORDER — TOPIRAMATE 100 MG/1
100 TABLET, FILM COATED ORAL EVERY 12 HOURS
Qty: 60 TABLET | Refills: 3 | Status: SHIPPED | OUTPATIENT
Start: 2023-11-29

## 2023-12-07 DIAGNOSIS — Z11.1 ENCOUNTER FOR PPD TEST: Primary | ICD-10-CM

## 2023-12-08 ENCOUNTER — TELEPHONE (OUTPATIENT)
Dept: FAMILY MEDICINE CLINIC | Facility: CLINIC | Age: 33
End: 2023-12-08

## 2023-12-08 LAB
CHOLEST SERPL-MCNC: 158 MG/DL (ref 100–199)
CHOLEST/HDLC SERPL: 4.1 RATIO (ref 0–4.4)
EST. AVERAGE GLUCOSE BLD GHB EST-MCNC: 103 MG/DL
HBA1C MFR BLD: 5.2 % (ref 4.8–5.6)
HDLC SERPL-MCNC: 39 MG/DL
LDLC SERPL CALC-MCNC: 92 MG/DL (ref 0–99)
SL AMB VLDL CHOLESTEROL CALC: 27 MG/DL (ref 5–40)
TRIGL SERPL-MCNC: 152 MG/DL (ref 0–149)

## 2023-12-08 NOTE — TELEPHONE ENCOUNTER
Pt aware     ----- Message from [address] [place] [address] sent at 12/8/2023  8:06 AM EST -----  Labs improved from previous. Continue healthy diet/exercise.

## 2023-12-09 LAB
GAMMA INTERFERON BACKGROUND BLD IA-ACNC: 0 IU/ML
M TB IFN-G CD4+ T-CELLS BLD-ACNC: 0.01 IU/ML
M TB IFN-G CD4+ T-CELLS BLD-ACNC: 0.03 IU/ML
MITOGEN IGNF BLD-ACNC: >10 IU/ML
QUANTIFERON INCUBATION COMMENT: NORMAL
QUANTIFERON-TB GOLD PLUS: NEGATIVE
SERVICE CMNT-IMP: NORMAL

## 2023-12-18 ENCOUNTER — TELEPHONE (OUTPATIENT)
Dept: FAMILY MEDICINE CLINIC | Facility: CLINIC | Age: 33
End: 2023-12-18

## 2023-12-18 ENCOUNTER — HOSPITAL ENCOUNTER (OUTPATIENT)
Dept: MRI IMAGING | Facility: HOSPITAL | Age: 33
Discharge: HOME | End: 2023-12-18
Attending: PSYCHIATRY & NEUROLOGY
Payer: COMMERCIAL

## 2023-12-18 DIAGNOSIS — R90.82 WHITE MATTER ABNORMALITY ON MRI OF BRAIN: ICD-10-CM

## 2023-12-18 PROCEDURE — A9585 GADOBUTROL INJECTION: HCPCS | Performed by: PSYCHIATRY & NEUROLOGY

## 2023-12-18 PROCEDURE — 70553 MRI BRAIN STEM W/O & W/DYE: CPT

## 2023-12-18 PROCEDURE — G1004 CDSM NDSC: HCPCS

## 2023-12-18 RX ORDER — GADOBUTROL 604.72 MG/ML
13 INJECTION INTRAVENOUS
Status: COMPLETED | OUTPATIENT
Start: 2023-12-18 | End: 2023-12-18

## 2023-12-18 RX ADMIN — GADOBUTROL 13 ML: 604.72 INJECTION INTRAVENOUS at 12:42

## 2023-12-18 NOTE — TELEPHONE ENCOUNTER
Pt aware    ----- Message from DIXIE Barbour sent at 12/18/2023 10:44 AM EST -----  Negative TB test

## 2023-12-29 DIAGNOSIS — M54.50 CHRONIC BILATERAL LOW BACK PAIN WITHOUT SCIATICA: ICD-10-CM

## 2023-12-29 DIAGNOSIS — G89.29 CHRONIC BILATERAL LOW BACK PAIN WITHOUT SCIATICA: ICD-10-CM

## 2023-12-29 DIAGNOSIS — R20.2 PARESTHESIA: Primary | ICD-10-CM

## 2023-12-29 NOTE — PROGRESS NOTES
1. Paresthesia    - EMG 1 Upper/1 Lower Neuropathy; Future        Oumar Berg MD.   Staff Neurologist  12/29/23   8:49 AM

## 2024-01-02 ENCOUNTER — OFFICE VISIT (OUTPATIENT)
Dept: URGENT CARE | Facility: CLINIC | Age: 34
End: 2024-01-02
Payer: COMMERCIAL

## 2024-01-02 VITALS
TEMPERATURE: 97.7 F | HEART RATE: 73 BPM | RESPIRATION RATE: 18 BRPM | OXYGEN SATURATION: 99 % | SYSTOLIC BLOOD PRESSURE: 155 MMHG | DIASTOLIC BLOOD PRESSURE: 92 MMHG

## 2024-01-02 DIAGNOSIS — J06.9 VIRAL URI WITH COUGH: Primary | ICD-10-CM

## 2024-01-02 PROCEDURE — 99213 OFFICE O/P EST LOW 20 MIN: CPT

## 2024-01-02 RX ORDER — BENZONATATE 200 MG/1
200 CAPSULE ORAL 3 TIMES DAILY PRN
Qty: 20 CAPSULE | Refills: 0 | Status: SHIPPED | OUTPATIENT
Start: 2024-01-02

## 2024-01-02 RX ORDER — FLUTICASONE PROPIONATE 50 MCG
1 SPRAY, SUSPENSION (ML) NASAL DAILY
Qty: 11.1 G | Refills: 0 | Status: SHIPPED | OUTPATIENT
Start: 2024-01-02

## 2024-01-02 NOTE — PROGRESS NOTES
[facility]        NAME: Jennifer Johnson is a 33 y.o. female  : 1990    MRN: [id number]  DATE: 2024  TIME: 10:55 AM    Assessment and Plan   Viral URI with cough [J06.9]  1. Viral URI with cough  benzonatate (TESSALON) 200 MG capsule    fluticasone (FLONASE) 50 mcg/act nasal spray            Patient Instructions   Start taking Flonase daily  Mucinex daily  Tessalon pearls needed for cough  Follow up with PCP in 3-5 days.  Proceed to  ER if symptoms worsen.    Chief Complaint     Chief Complaint   Patient presents with    Cold Like Symptoms     Pt states she has been sick with nasal congestion and cough since the week of . She was seen at [place], given prednisone and augmentin - did not help.         History of Present Illness       This is a 33-year-old female who presents with continued cough and congestion.  She started 4 weeks ago with an upper respiratory infection was seen at [facility].  She was prescribed amoxicillin and steroids on .  She states she is not feeling any better she continues with a cough and the mucus is clear.  She has nasal congestion and postnasal drip.  She denies any fever fatigue        Review of Systems   Review of Systems   Constitutional:  Negative for fatigue and fever.   HENT:  Positive for congestion and postnasal drip. Negative for sinus pressure, sinus pain and sore throat.    Eyes: Negative.    Respiratory:  Positive for cough. Negative for shortness of breath and wheezing.    Cardiovascular:  Negative for chest pain.   Gastrointestinal:  Negative for diarrhea, nausea and vomiting.   Genitourinary: Negative.    Musculoskeletal:  Negative for myalgias.   Neurological:  Negative for headaches.         Current Medications       Current Outpatient Medications:     albuterol (PROVENTIL HFA,VENTOLIN HFA) 90 mcg/act inhaler, Inhale 1 puff every 6 (six) hours as needed, Disp: , Rfl:     atorvastatin (LIPITOR) 10 mg tablet, Take 1 tablet  (10 mg total) by mouth daily, Disp: 90 tablet, Rfl: 1    benzonatate (TESSALON) 200 MG capsule, Take 1 capsule (200 mg total) by mouth 3 (three) times a day as needed for cough, Disp: 20 capsule, Rfl: 0    cyclobenzaprine (FLEXERIL) 10 mg tablet, Take 1 tablet (10 mg total) by mouth 2 (two) times a day as needed for muscle spasms, Disp: 30 tablet, Rfl: 0    fluticasone (FLONASE) 50 mcg/act nasal spray, 1 spray into each nostril daily, Disp: 11.1 g, Rfl: 0    lisinopril (ZESTRIL) 40 mg tablet, Take 1 tablet (40 mg total) by mouth daily, Disp: 90 tablet, Rfl: 1    LORazepam (Ativan) 0.5 mg tablet, Take 1 tablet (0.5 mg total) by mouth as needed for anxiety As the patient is claustrophobic I would recommend; one tablet of Lorazepam 15 minutes before going into the MRI tube and then 1 tablet just before going into the MRI tube., Disp: 4 tablet, Rfl: 0    OLANZapine (ZyPREXA) 5 mg tablet, TAKE 1 TABLET (5 MG TOTAL) BY MOUTH IN THE MORNING, Disp: 30 tablet, Rfl: 2    topiramate (TOPAMAX) 100 mg tablet, TAKE 1 TABLET BY MOUTH EVERY 12 HOURS, Disp: 60 tablet, Rfl: 3    benzonatate (TESSALON) 200 MG capsule, Take 1 capsule (200 mg total) by mouth 3 (three) times a day as needed for cough (Patient not taking: Reported on 11/20/2023), Disp: 20 capsule, Rfl: 0    Diclofenac Sodium (Voltaren) 1 %, Apply 2 g topically 4 (four) times a day for 10 days, Disp: 80 g, Rfl: 0    hydrochlorothiazide (HYDRODIURIL) 25 mg tablet, Take 1 tablet (25 mg total) by mouth daily (Patient not taking: Reported on 11/20/2023), Disp: 90 tablet, Rfl: 1    Current Allergies     Allergies as of 01/02/2024    (No Known Allergies)            The following portions of the patient's history were reviewed and updated as appropriate: allergies, current medications, past family history, past medical history, past social history, past surgical history and problem list.     Past Medical History:   Diagnosis Date    Asthma     Hypertension     Migraine         Past Surgical History:   Procedure Laterality Date     SECTION  2018    CHOLECYSTECTOMY  2012    KNEE SURGERY         Family History   Problem Relation Age of Onset    Hypertension Mother          Medications have been verified.        Objective   /92   Pulse 73   Temp 97.7 °F (36.5 °C)   Resp 18   SpO2 99%   No LMP recorded.       Physical Exam     Physical Exam  Constitutional:       Appearance: Normal appearance. She is normal weight.   HENT:      Head: Normocephalic and atraumatic.      Right Ear: Tympanic membrane, ear canal and external ear normal.      Left Ear: Tympanic membrane, ear canal and external ear normal.      Nose: Nose normal.      Mouth/Throat:      Mouth: Mucous membranes are moist.      Pharynx: Oropharynx is clear.   Eyes:      Conjunctiva/sclera: Conjunctivae normal.      Pupils: Pupils are equal, round, and reactive to light.   Cardiovascular:      Rate and Rhythm: Normal rate and regular rhythm.      Pulses: Normal pulses.      Heart sounds: Normal heart sounds.   Pulmonary:      Effort: Pulmonary effort is normal.      Breath sounds: Normal breath sounds. No wheezing, rhonchi or rales.   Abdominal:      General: Abdomen is flat. Bowel sounds are normal.      Tenderness: There is no abdominal tenderness.   Musculoskeletal:         General: Normal range of motion.      Cervical back: Normal range of motion. No tenderness.   Skin:     General: Skin is warm and dry.      Capillary Refill: Capillary refill takes less than 2 seconds.   Neurological:      General: No focal deficit present.      Mental Status: She is alert. Mental status is at baseline.   Psychiatric:         Mood and Affect: Mood normal.         Thought Content: Thought content normal.         Judgment: Judgment normal.

## 2024-01-03 RX ORDER — CYCLOBENZAPRINE HCL 10 MG
10 TABLET ORAL 2 TIMES DAILY PRN
Qty: 30 TABLET | Refills: 0 | Status: SHIPPED | OUTPATIENT
Start: 2024-01-03

## 2024-01-20 DIAGNOSIS — Z00.6 ENCOUNTER FOR EXAMINATION FOR NORMAL COMPARISON OR CONTROL IN CLINICAL RESEARCH PROGRAM: ICD-10-CM

## 2024-01-23 ENCOUNTER — OFFICE VISIT (OUTPATIENT)
Dept: GASTROENTEROLOGY | Facility: CLINIC | Age: 34
End: 2024-01-23
Payer: COMMERCIAL

## 2024-01-23 ENCOUNTER — APPOINTMENT (OUTPATIENT)
Dept: LAB | Facility: HOSPITAL | Age: 34
End: 2024-01-23
Payer: COMMERCIAL

## 2024-01-23 VITALS
WEIGHT: 293 LBS | SYSTOLIC BLOOD PRESSURE: 148 MMHG | DIASTOLIC BLOOD PRESSURE: 90 MMHG | HEIGHT: 62 IN | BODY MASS INDEX: 53.92 KG/M2

## 2024-01-23 DIAGNOSIS — Z00.6 ENCOUNTER FOR EXAMINATION FOR NORMAL COMPARISON OR CONTROL IN CLINICAL RESEARCH PROGRAM: ICD-10-CM

## 2024-01-23 DIAGNOSIS — R10.84 GENERALIZED ABDOMINAL PAIN: Primary | ICD-10-CM

## 2024-01-23 DIAGNOSIS — E66.01 OBESITY, MORBID, BMI 50 OR HIGHER (HCC): ICD-10-CM

## 2024-01-23 DIAGNOSIS — R19.7 DIARRHEA IN PEDIATRIC PATIENT: ICD-10-CM

## 2024-01-23 DIAGNOSIS — R19.7 DIARRHEA, UNSPECIFIED TYPE: ICD-10-CM

## 2024-01-23 LAB
ALBUMIN SERPL BCP-MCNC: 4.1 G/DL (ref 3.5–5)
ALP SERPL-CCNC: 61 U/L (ref 34–104)
ALT SERPL W P-5'-P-CCNC: 24 U/L (ref 7–52)
ANION GAP SERPL CALCULATED.3IONS-SCNC: 10 MMOL/L
AST SERPL W P-5'-P-CCNC: 20 U/L (ref 13–39)
BASOPHILS # BLD AUTO: 0.03 THOUSANDS/ÂΜL (ref 0–0.1)
BASOPHILS NFR BLD AUTO: 0 % (ref 0–1)
BILIRUB SERPL-MCNC: 0.25 MG/DL (ref 0.2–1)
BUN SERPL-MCNC: 9 MG/DL (ref 5–25)
CALCIUM SERPL-MCNC: 9.7 MG/DL (ref 8.4–10.2)
CHLORIDE SERPL-SCNC: 107 MMOL/L (ref 96–108)
CO2 SERPL-SCNC: 23 MMOL/L (ref 21–32)
CREAT SERPL-MCNC: 0.39 MG/DL (ref 0.6–1.3)
EOSINOPHIL # BLD AUTO: 0.21 THOUSAND/ÂΜL (ref 0–0.61)
EOSINOPHIL NFR BLD AUTO: 2 % (ref 0–6)
ERYTHROCYTE [DISTWIDTH] IN BLOOD BY AUTOMATED COUNT: 13.5 % (ref 11.6–15.1)
GFR SERPL CREATININE-BSD FRML MDRD: 140 ML/MIN/1.73SQ M
GLUCOSE P FAST SERPL-MCNC: 145 MG/DL (ref 65–99)
HCT VFR BLD AUTO: 38.2 % (ref 34.8–46.1)
HGB BLD-MCNC: 12.7 G/DL (ref 11.5–15.4)
IMM GRANULOCYTES # BLD AUTO: 0.05 THOUSAND/UL (ref 0–0.2)
IMM GRANULOCYTES NFR BLD AUTO: 1 % (ref 0–2)
LYMPHOCYTES # BLD AUTO: 2.37 THOUSANDS/ÂΜL (ref 0.6–4.47)
LYMPHOCYTES NFR BLD AUTO: 25 % (ref 14–44)
MCH RBC QN AUTO: 30.2 PG (ref 26.8–34.3)
MCHC RBC AUTO-ENTMCNC: 33.2 G/DL (ref 31.4–37.4)
MCV RBC AUTO: 91 FL (ref 82–98)
MONOCYTES # BLD AUTO: 0.42 THOUSAND/ÂΜL (ref 0.17–1.22)
MONOCYTES NFR BLD AUTO: 4 % (ref 4–12)
NEUTROPHILS # BLD AUTO: 6.37 THOUSANDS/ÂΜL (ref 1.85–7.62)
NEUTS SEG NFR BLD AUTO: 68 % (ref 43–75)
NRBC BLD AUTO-RTO: 0 /100 WBCS
PLATELET # BLD AUTO: 387 THOUSANDS/UL (ref 149–390)
PMV BLD AUTO: 9.7 FL (ref 8.9–12.7)
POTASSIUM SERPL-SCNC: 3.4 MMOL/L (ref 3.5–5.3)
PROT SERPL-MCNC: 7.1 G/DL (ref 6.4–8.4)
RBC # BLD AUTO: 4.21 MILLION/UL (ref 3.81–5.12)
SODIUM SERPL-SCNC: 140 MMOL/L (ref 135–147)
TSH SERPL DL<=0.05 MIU/L-ACNC: 1.01 UIU/ML (ref 0.45–4.5)
WBC # BLD AUTO: 9.45 THOUSAND/UL (ref 4.31–10.16)

## 2024-01-23 PROCEDURE — 86231 EMA EACH IG CLASS: CPT

## 2024-01-23 PROCEDURE — 82784 ASSAY IGA/IGD/IGG/IGM EACH: CPT

## 2024-01-23 PROCEDURE — 86364 TISS TRNSGLTMNASE EA IG CLAS: CPT

## 2024-01-23 PROCEDURE — 99203 OFFICE O/P NEW LOW 30 MIN: CPT | Performed by: INTERNAL MEDICINE

## 2024-01-23 PROCEDURE — 36415 COLL VENOUS BLD VENIPUNCTURE: CPT

## 2024-01-23 PROCEDURE — 86258 DGP ANTIBODY EACH IG CLASS: CPT

## 2024-01-23 PROCEDURE — 84443 ASSAY THYROID STIM HORMONE: CPT

## 2024-01-23 PROCEDURE — 85025 COMPLETE CBC W/AUTO DIFF WBC: CPT

## 2024-01-23 PROCEDURE — 80053 COMPREHEN METABOLIC PANEL: CPT

## 2024-01-23 NOTE — PROGRESS NOTES
[facility] - Outpatient Consultation  Jennifer Johnson 33 y.o. female MRN: [id number]  Encounter: [id number]    ASSESSMENT AND PLAN:    1. Generalized abdominal pain    2. Diarrhea, unspecified type  -     Giardia antigen; Future  -     Pancreatic elastase, fecal; Future  -     Stool Enteric Bacterial Panel by PCR; Future  -     Calprotectin,Fecal; Future  -     TSH, 3rd generation; Future  -     CBC and differential; Future  -     Celiac Disease Panel; Future  -     Clostridium difficile toxin by PCR with EIA; Future  -     Comprehensive metabolic panel; Future  -     Giardia antigen  -     Pancreatic elastase, fecal  -     Stool Enteric Bacterial Panel by PCR  -     Calprotectin,Fecal  -     TSH, 3rd generation  -     CBC and differential  -     Celiac Disease Panel  -     Clostridium difficile toxin by PCR with EIA  -     Comprehensive metabolic panel    3. Obesity, morbid, BMI 50 or higher (HCC)    Patient with a BMI of 54.  Continuing exercise dietary changes.    Diarrhea.  Patient with long history of diarrhea.  Will need to rule out infections, endocrinopathies, celiac, pancreatic insufficiency.  Will do stool and blood test as noted above.  Will also start fiber therapy.  If studies are unremarkable we will plan on colonoscopy to further evaluate for microscopic colitis              ---    Chief Complaint   Patient presents with   • Diarrhea     When pt eats meat is goes right through her,pain at times on left side upper, constant burping       HPI:   Jennifer Johnson is a 33 y.o. year old female with past medical history of bipolar, depression, and migraines, who is presenting for evaluation for diarrhea, also with a history of obesity.    The patient reports diarrhea when eating meat such as pork chops, chicken, and ground turkey. She occasionally experiences diarrhea even when consuming eggs. She is on a lean diet due to high cholesterol and is concerned about her protein  intake. She has a family history of gastrointestinal problems. She has tried protein powder, which did not help. She experiences 3 to 4 episodes of diarrhea daily. She denies nocturia. She denies hematochezia. She gets left upper abdominal pain which occasionally persists or lingers even after a bowel movement but may also get better at times. She experiences constant excessive eructation. She denies any nausea, vomiting, or heartburn. She occasionally experiences dysphagia and has started coughing. She is not on any over the counter medications for this. Her symptoms have been going on for a couple of months before her upper respiratory illness. She took antibiotics at that time and it did not exacerbate the situation. She denies having steatorrhea. She denies any family history of colon cancer or stomach cancers.    She had a liver function panel test in 2023, which was normal.         Answers submitted by the patient for this visit:  Abdominal Pain Questionnaire (Submitted on 2024)  Chief Complaint: Abdominal pain  Chronicity: chronic  Onset: 1 to 4 weeks ago  Onset quality: gradual  Frequency: constantly  Episode duration: 4 Hours  Progression since onset: gradually worsening  Pain location: generalized abdominal region  Pain - numeric: 10/10  Pain quality: aching, cramping, dull  Radiates to: LLQ, LUQ, RLQ  anorexia: No  belching: Yes  constipation: No  diarrhea: Yes  dysuria: No  fever: No  flatus: Yes  frequency: Yes  headaches: Yes  hematochezia: No  hematuria: No  melena: No  myalgias: No  nausea: No  weight loss: No  vomiting: No  Aggravated by: eating  Relieved by: belching, bowel movements      LAB/RADIOLOGY/ENDOSCOPY RESULTS:       Historical Information   Past Medical History:   Diagnosis Date   • Asthma    • Hypertension    • Migraine      Past Surgical History:   Procedure Laterality Date   •  SECTION     • CHOLECYSTECTOMY     • KNEE SURGERY       Social History  "    Substance and Sexual Activity   Alcohol Use No     Social History     Substance and Sexual Activity   Drug Use No     Social History     Tobacco Use   Smoking Status Former   • Current packs/day: 1.00   • Average packs/day: 1 pack/day for 20.0 years (20.0 ttl pk-yrs)   • Types: Cigarettes   Smokeless Tobacco Never     Family History   Problem Relation Age of Onset   • Hypertension Mother    • Asthma Mother    • ADD / ADHD Father        Meds/Allergies     Current Outpatient Medications:   •  albuterol (PROVENTIL HFA,VENTOLIN HFA) 90 mcg/act inhaler  •  atorvastatin (LIPITOR) 10 mg tablet  •  benzonatate (TESSALON) 200 MG capsule  •  cyclobenzaprine (FLEXERIL) 10 mg tablet  •  fluticasone (FLONASE) 50 mcg/act nasal spray  •  lisinopril (ZESTRIL) 40 mg tablet  •  LORazepam (Ativan) 0.5 mg tablet  •  OLANZapine (ZyPREXA) 5 mg tablet  •  topiramate (TOPAMAX) 100 mg tablet  •  benzonatate (TESSALON) 200 MG capsule  •  Diclofenac Sodium (Voltaren) 1 %  •  hydrochlorothiazide (HYDRODIURIL) 25 mg tablet  No Known Allergies    PHYSICAL EXAM:    Blood pressure 148/90, height 5' 2\" (1.575 m), weight 134 kg (296 lb), unknown if currently breastfeeding. Body mass index is 54.14 kg/m².  General Appearance: No apparent distress, cooperative, alert.  Eyes: Anicteric.  Gastrointestinal: Soft, non-tender, non-distended; normal bowel sounds; no masses, no organomegaly.  Rectal: Deferred.  Musculoskeletal: No edema.  Skin: No jaundice.    OTHER LAB RESULTS:   Lab Results   Component Value Date    WBC 8.49 03/24/2016    HGB 14.3 03/24/2016    HGB 14.4 03/24/2016    MCV 89 03/24/2016     (H) 03/24/2016     Lab Results   Component Value Date    K 3.0 (L) 08/31/2023    CL 95 (L) 08/31/2023    CO2 27 08/31/2023    BUN 10 08/31/2023    CREATININE 0.64 08/31/2023    GLUCOSE 99 03/24/2016    AST 14 08/31/2023    ALT 15 08/31/2023    EGFR 121 08/31/2023     No results found for: \"IRON\", \"TIBC\", \"FERRITIN\"  No results found for: " "\"LIPASE\"    OTHER RADIOLOGY RESULTS:   No results found.    Transcribed for Krishan Villeda MD, by Tiffany Young on 01/23/24 at 3:06 PM. Powered by [employer].  "

## 2024-01-23 NOTE — PATIENT INSTRUCTIONS
Fiber Supplementation Instructions     Insufficient fiber in the diet can lead to constipation which may result in the development of hemorrhoids and/or anal fissures. Increased fiber intake has been shown to reduce constipation by softening the stool and increasing the regularity of bowel movements. Soluble fiber (such as Benefiber®) is easy to incorporate into your diet as it is tasteless and can be mixed with food or drink. Begin with 1 tbsp once a day and then gradually increase to 1 tbsp 3 times a day over the period of a few days. Stir Benefiber® into 4-8 ounces of liquid (carbonated beverages are not recommended) or mix with soft food (hot or cold). Stir well until dissolved. Increase your fluid intake as necessary to ensure you are drinking 7-8 glasses of water every day. Supplemental fiber should be taken with meals for greatest benefit. Many less-expensive generic versions of Benefiber® are available with similar active components (pictured below)

## 2024-01-24 DIAGNOSIS — I10 BENIGN ESSENTIAL HTN: ICD-10-CM

## 2024-01-24 RX ORDER — LISINOPRIL 40 MG/1
40 TABLET ORAL DAILY
Qty: 90 TABLET | Refills: 1 | Status: CANCELLED | OUTPATIENT
Start: 2024-01-24

## 2024-01-25 ENCOUNTER — APPOINTMENT (OUTPATIENT)
Dept: LAB | Facility: HOSPITAL | Age: 34
End: 2024-01-25
Attending: INTERNAL MEDICINE
Payer: COMMERCIAL

## 2024-01-25 DIAGNOSIS — R19.7 DIARRHEA, UNSPECIFIED TYPE: Primary | ICD-10-CM

## 2024-01-25 DIAGNOSIS — K86.89 PANCREATIC INSUFFICIENCY: ICD-10-CM

## 2024-01-25 LAB
ENDOMYSIUM IGA SER QL: NEGATIVE
GLIADIN PEPTIDE IGA SER-ACNC: 4 UNITS (ref 0–19)
GLIADIN PEPTIDE IGG SER-ACNC: 2 UNITS (ref 0–19)
IGA SERPL-MCNC: 267 MG/DL (ref 87–352)
TTG IGA SER-ACNC: <2 U/ML (ref 0–3)
TTG IGG SER-ACNC: <2 U/ML (ref 0–5)

## 2024-01-25 PROCEDURE — 83993 ASSAY FOR CALPROTECTIN FECAL: CPT

## 2024-01-25 PROCEDURE — 82653 EL-1 FECAL QUANTITATIVE: CPT

## 2024-01-25 PROCEDURE — 87493 C DIFF AMPLIFIED PROBE: CPT

## 2024-01-25 PROCEDURE — 87505 NFCT AGENT DETECTION GI: CPT

## 2024-01-25 PROCEDURE — 87329 GIARDIA AG IA: CPT

## 2024-01-26 DIAGNOSIS — I10 BENIGN ESSENTIAL HTN: ICD-10-CM

## 2024-01-26 LAB
C COLI+JEJUNI TUF STL QL NAA+PROBE: NEGATIVE
C DIFF TOX GENS STL QL NAA+PROBE: NEGATIVE
EC STX1+STX2 GENES STL QL NAA+PROBE: NEGATIVE
G LAMBLIA AG STL QL IA: NEGATIVE
SALMONELLA SP SPAO STL QL NAA+PROBE: NEGATIVE
SHIGELLA SP+EIEC IPAH STL QL NAA+PROBE: NEGATIVE

## 2024-01-26 RX ORDER — LISINOPRIL 40 MG/1
40 TABLET ORAL DAILY
Qty: 90 TABLET | Refills: 1 | Status: SHIPPED | OUTPATIENT
Start: 2024-01-26

## 2024-01-30 LAB — CALPROTECTIN STL-MCNT: 6 UG/G (ref 0–120)

## 2024-01-31 LAB — ELASTASE PANC STL-MCNT: <50 UG ELAST./G

## 2024-02-01 ENCOUNTER — PATIENT MESSAGE (OUTPATIENT)
Dept: FAMILY MEDICINE CLINIC | Facility: CLINIC | Age: 34
End: 2024-02-01

## 2024-02-01 DIAGNOSIS — H92.03 DISCOMFORT OF BOTH EARS: Primary | ICD-10-CM

## 2024-02-01 RX ORDER — PANCRELIPASE 36000; 180000; 114000 [USP'U]/1; [USP'U]/1; [USP'U]/1
36000 CAPSULE, DELAYED RELEASE PELLETS ORAL
Qty: 90 CAPSULE | Refills: 1 | Status: SHIPPED | OUTPATIENT
Start: 2024-02-01

## 2024-02-13 DIAGNOSIS — E78.2 MIXED HYPERLIPIDEMIA: ICD-10-CM

## 2024-02-13 DIAGNOSIS — R73.03 PREDIABETES: ICD-10-CM

## 2024-02-13 RX ORDER — ATORVASTATIN CALCIUM 10 MG/1
10 TABLET, FILM COATED ORAL DAILY
Qty: 90 TABLET | Refills: 1 | Status: SHIPPED | OUTPATIENT
Start: 2024-02-13

## 2024-02-18 DIAGNOSIS — M54.50 CHRONIC BILATERAL LOW BACK PAIN WITHOUT SCIATICA: ICD-10-CM

## 2024-02-18 DIAGNOSIS — G89.29 CHRONIC BILATERAL LOW BACK PAIN WITHOUT SCIATICA: ICD-10-CM

## 2024-02-18 LAB
APOB+LDLR+PCSK9 GENE MUT ANL BLD/T: NOT DETECTED
BRCA1+BRCA2 DEL+DUP + FULL MUT ANL BLD/T: NOT DETECTED
MLH1+MSH2+MSH6+PMS2 GN DEL+DUP+FUL M: NOT DETECTED

## 2024-02-19 RX ORDER — CYCLOBENZAPRINE HCL 10 MG
10 TABLET ORAL 2 TIMES DAILY PRN
Qty: 30 TABLET | Refills: 0 | Status: SHIPPED | OUTPATIENT
Start: 2024-02-19

## 2024-02-21 PROBLEM — J06.9 VIRAL URI WITH COUGH: Status: RESOLVED | Noted: 2024-01-02 | Resolved: 2024-02-21

## 2024-02-22 ENCOUNTER — HOSPITAL ENCOUNTER (OUTPATIENT)
Dept: MAMMOGRAPHY | Facility: CLINIC | Age: 34
Discharge: HOME | End: 2024-02-22
Payer: COMMERCIAL

## 2024-02-22 ENCOUNTER — HOSPITAL ENCOUNTER (OUTPATIENT)
Dept: ULTRASOUND IMAGING | Facility: CLINIC | Age: 34
Discharge: HOME | End: 2024-02-22
Payer: COMMERCIAL

## 2024-02-22 VITALS — HEIGHT: 62 IN | WEIGHT: 293 LBS | BODY MASS INDEX: 53.92 KG/M2

## 2024-02-22 DIAGNOSIS — R22.31 LUMP OF AXILLA, RIGHT: ICD-10-CM

## 2024-02-22 DIAGNOSIS — R22.31 AXILLARY LUMP, RIGHT: ICD-10-CM

## 2024-02-22 PROCEDURE — 76642 ULTRASOUND BREAST LIMITED: CPT

## 2024-02-22 PROCEDURE — G0279 TOMOSYNTHESIS, MAMMO: HCPCS

## 2024-02-22 PROCEDURE — 77066 DX MAMMO INCL CAD BI: CPT

## 2024-02-23 ENCOUNTER — TELEPHONE (OUTPATIENT)
Dept: FAMILY MEDICINE CLINIC | Facility: CLINIC | Age: 34
End: 2024-02-23

## 2024-02-23 NOTE — TELEPHONE ENCOUNTER
----- Message from DIXIE Barbour sent at 2/23/2024  8:04 AM EST -----  Negative mammogram. Routine screening at age 42

## 2024-03-13 ENCOUNTER — APPOINTMENT (OUTPATIENT)
Dept: LAB | Facility: HOSPITAL | Age: 34
End: 2024-03-13
Payer: COMMERCIAL

## 2024-03-13 ENCOUNTER — OFFICE VISIT (OUTPATIENT)
Dept: GASTROENTEROLOGY | Facility: CLINIC | Age: 34
End: 2024-03-13
Payer: COMMERCIAL

## 2024-03-13 VITALS
BODY MASS INDEX: 53.92 KG/M2 | SYSTOLIC BLOOD PRESSURE: 134 MMHG | WEIGHT: 293 LBS | HEIGHT: 62 IN | DIASTOLIC BLOOD PRESSURE: 60 MMHG

## 2024-03-13 DIAGNOSIS — R10.12 LUQ ABDOMINAL PAIN: ICD-10-CM

## 2024-03-13 DIAGNOSIS — K86.89 PANCREATIC INSUFFICIENCY: ICD-10-CM

## 2024-03-13 DIAGNOSIS — K86.89 PANCREATIC INSUFFICIENCY: Primary | ICD-10-CM

## 2024-03-13 DIAGNOSIS — K86.89 SUBCUTANEOUS NODULAR FAT NECROSIS IN PANCREATITIS: ICD-10-CM

## 2024-03-13 DIAGNOSIS — E66.01 OBESITY, MORBID, BMI 50 OR HIGHER (HCC): ICD-10-CM

## 2024-03-13 LAB
FERRITIN SERPL-MCNC: 50 NG/ML (ref 11–307)
IRON SATN MFR SERPL: 12 % (ref 15–50)
IRON SERPL-MCNC: 43 UG/DL (ref 50–212)
TIBC SERPL-MCNC: 351 UG/DL (ref 250–450)
UIBC SERPL-MCNC: 308 UG/DL (ref 155–355)

## 2024-03-13 PROCEDURE — 36415 COLL VENOUS BLD VENIPUNCTURE: CPT

## 2024-03-13 PROCEDURE — 99214 OFFICE O/P EST MOD 30 MIN: CPT | Performed by: INTERNAL MEDICINE

## 2024-03-13 PROCEDURE — 82728 ASSAY OF FERRITIN: CPT

## 2024-03-13 PROCEDURE — 82103 ALPHA-1-ANTITRYPSIN TOTAL: CPT

## 2024-03-13 PROCEDURE — 83540 ASSAY OF IRON: CPT

## 2024-03-13 PROCEDURE — 83550 IRON BINDING TEST: CPT

## 2024-03-13 RX ORDER — PROPRANOLOL HYDROCHLORIDE 40 MG/1
40 TABLET ORAL 2 TIMES DAILY
COMMUNITY

## 2024-03-13 NOTE — PROGRESS NOTES
[facility] - Outpatient Follow-up Note  Jennifer Johnson 33 y.o. female MRN: [id number]  Encounter: [id number]    ASSESSMENT AND PLAN:    1. Pancreatic insufficiency  -     CT abdomen pelvis w contrast; Future; Expected date: 03/13/2024  -     Cystic fibrosis gene test; Future  -     Alpha 1 Antitrypsin Phenotype; Future  -     Iron Panel (Includes Ferritin, Iron Sat%, Iron, and TIBC); Future  -     Alpha 1 Antitrypsin Phenotype    2. LUQ abdominal pain    3. Obesity, morbid, BMI 50 or higher (HCC)      Pancreatic insufficiency.  The patient is to continue her current dose of Creon, which is working well. I will also work up causes including testing for cystic fibrosis, CT scan of the pancreas, and ruling out alpha-1 antitrypsin (A1AT) or hemochromatosis.    Left upper quadrant abdominal pain.  At this time, it could be musculoskeletal, could be from her pancreatic insufficiency. I will evaluate with a CT scan of the abdomen. I will need to evaluate the spleen as well.    Morbid obesity.  She will continue diet and exercise changes. I will consider weight management clinic.          ---I have spent 31 minutes today on the date of visit which was spent on one or more of the following: obtaining and reviewing separately obtained history, performing a medically appropriate examination and evaluation, counseling and educating the patient, ordering medications, tests, and procedures, documenting clinical information in the electronic or other health record, and care coordination.    Chief Complaint   Patient presents with   • Follow-up     ABDOMINAL PAIN       HPI:   Jennifer Johnson is a 33 y.o. year old female who presents for follow-up. At the time, she presented with a long history of diarrhea.     She reports feeling good. She is currently taking Creon, which helped in managing her diarrhea. However, if she inadvertently forgets to take this medication, she experiences a recurrence of  diarrhea. Her bowel movement showed improvement while she is on Creon. She continues to experience persistent left upper quadrant abdominal pain. Occasionally, she has pain immediately after consuming specific foods, and this discomfort persists without relief. The pain spontaneously resolves over time and does not disrupt her sleep during the night. She denies fevers, nausea, vomiting, dysphagia, or food impaction. She denies experiencing hematochezia.    She has a history of prediabetes.    She underwent an abdominal scan in 2020.    Her maternal family history remains unknown due to her mother's adoption. However, her mother has been informed that there is a family history of heart problems.    HPI on 01/23/2024:  Jennifer Johnson is a 33 y.o. year old female with past medical history of bipolar, depression, and migraines, who is presenting for evaluation for diarrhea, also with a history of obesity.    The patient reports diarrhea when eating meat such as pork chops, chicken, and ground turkey. She occasionally experiences diarrhea even when consuming eggs. She is on a lean diet due to high cholesterol and is concerned about her protein intake. She has a family history of gastrointestinal problems. She has tried protein powder, which did not help. She experiences 3 to 4 episodes of diarrhea daily. She denies nocturia. She denies hematochezia. She gets left upper abdominal pain which occasionally persists or lingers even after a bowel movement but may also get better at times. She experiences constant excessive eructation. She denies any nausea, vomiting, or heartburn. She occasionally experiences dysphagia and has started coughing. She is not on any over the counter medications for this. Her symptoms have been going on for a couple of months before her upper respiratory illness. She took antibiotics at that time and it did not exacerbate the situation. She denies having steatorrhea. She denies any family history of  colon cancer or stomach cancers.    She had a liver function panel test in 2023, which was normal.     Answers submitted by the patient for this visit:  Abdominal Pain Questionnaire (Submitted on 3/6/2024)  Chief Complaint: Abdominal pain  Frequency: rarely  Progression since onset: resolved  Pain location: LUQ  Pain - numeric: 0/10  Radiates to: does not radiate  anorexia: No  arthralgias: No  belching: No  constipation: No  diarrhea: No  dysuria: No  fever: No  flatus: Yes  frequency: No  headaches: Yes  hematochezia: No  hematuria: No  melena: No  myalgias: No  nausea: No  weight loss: No  vomiting: No  Aggravated by: nothing  Relieved by: nothing    LAB/RADIOLOGY/ENDOSCOPY RESULTS:   Her laboratory tests showed a CBC, CMP, TSH that was unremarkable. LFTs within normal limits. Negative celiac disease. She already has stool culture, C. difficile, and fecal calprotectin. She did have fecal elastase that was less than 50 micrograms per gram. She was sent to [facility] to trial.    Historical Information   Past Medical History:   Diagnosis Date   • Asthma    • Hypertension    • Migraine      Past Surgical History:   Procedure Laterality Date   •  SECTION     • CHOLECYSTECTOMY     • KNEE SURGERY       Social History     Substance and Sexual Activity   Alcohol Use No     Social History     Substance and Sexual Activity   Drug Use No     Social History     Tobacco Use   Smoking Status Former   • Current packs/day: 1.00   • Average packs/day: 1 pack/day for 20.0 years (20.0 ttl pk-yrs)   • Types: Cigarettes   Smokeless Tobacco Never     Family History   Problem Relation Age of Onset   • Hypertension Mother    • Asthma Mother    • ADD / ADHD Father    • Breast cancer Sister    • Colon cancer Neg Hx    • Colon polyps Neg Hx        Meds/Allergies     Current Outpatient Medications:   •  albuterol (PROVENTIL HFA,VENTOLIN HFA) 90 mcg/act inhaler  •  atorvastatin (LIPITOR) 10 mg tablet  •  cyclobenzaprine  "(FLEXERIL) 10 mg tablet  •  lisinopril (ZESTRIL) 40 mg tablet  •  Pancrelipase, Lip-Prot-Amyl, (Creon) 56472-399232 units CPEP  •  propranolol (INDERAL) 40 mg tablet  •  benzonatate (TESSALON) 200 MG capsule  •  benzonatate (TESSALON) 200 MG capsule  •  Diclofenac Sodium (Voltaren) 1 %  •  fluticasone (FLONASE) 50 mcg/act nasal spray  •  hydrochlorothiazide (HYDRODIURIL) 25 mg tablet  •  LORazepam (Ativan) 0.5 mg tablet  •  OLANZapine (ZyPREXA) 5 mg tablet  •  topiramate (TOPAMAX) 100 mg tablet  No Known Allergies    PHYSICAL EXAM:    Blood pressure 134/60, height 5' 2\" (1.575 m), weight (!) 138 kg (304 lb 6.4 oz), last menstrual period 02/13/2024, unknown if currently breastfeeding. Body mass index is 55.68 kg/m².  General Appearance: No apparent distress, cooperative, alert.  Eyes: Anicteric.  Gastrointestinal: Soft, non-tender, non-distended; normal bowel sounds; no masses, no organomegaly.  Rectal: Deferred.  Musculoskeletal: No edema.  Skin: No jaundice.    OTHER LAB RESULTS:   Lab Results   Component Value Date    WBC 9.45 01/23/2024    WBC 8.49 03/24/2016    HGB 12.7 01/23/2024    HGB 14.3 03/24/2016    HGB 14.4 03/24/2016    MCV 91 01/23/2024     01/23/2024     (H) 03/24/2016     Lab Results   Component Value Date    K 3.4 (L) 01/23/2024     01/23/2024    CO2 23 01/23/2024    BUN 9 01/23/2024    CREATININE 0.39 (L) 01/23/2024    GLUCOSE 99 03/24/2016    GLUF 145 (H) 01/23/2024    CALCIUM 9.7 01/23/2024    AST 20 01/23/2024    AST 14 08/31/2023    AST 15 01/18/2021    ALT 24 01/23/2024    ALT 15 08/31/2023    ALT 21 01/18/2021    ALKPHOS 61 01/23/2024    ALKPHOS 74 01/18/2021    ALKPHOS 74 12/29/2020    EGFR 140 01/23/2024     No results found for: \"IRON\", \"TIBC\", \"FERRITIN\"  No results found for: \"LIPASE\"    OTHER RADIOLOGY RESULTS:   Mammo diagnostic bilateral w 3d & cad, US breast axilla right    Result Date: 2/22/2024  Narrative: DIAGNOSIS: Lump of axilla, right TECHNIQUE: Digital " diagnostic mammography was performed. Computer Aided Detection (CAD) analyzed all applicable images. Right breast ultrasound was performed. COMPARISONS: Prior breast imaging dated: 01/04/2022, 05/17/2021, 08/14/2020, 08/14/2020, 08/14/2020, and 08/14/2020 RELEVANT HISTORY: Family Breast Cancer History: History of breast cancer in Sister. Family Medical History: Family medical history includes breast cancer in sister. Personal History: Hormone history includes birth control. No known relevant surgical history. No known relevant medical history. RISK ASSESSMENT: 5 Year Tyrer-Cuzick: 0.48% 10 Year Tyrer-Cuzick: 1.46% Lifetime Tyrer-Cuzick: 25.99% TISSUE DENSITY: The breasts are almost entirely fatty. INDICATION: Jennifer Johnson is a 33 y.o. female presenting for right axillary lump. FINDINGS: Bilateral Mammo diagnostic bilateral w 3d & cad There are no suspicious masses, grouped microcalcifications or areas of unexplained architectural distortion. The skin and nipple areolar complex are unremarkable. Right US breast axilla right There are no suspicious masses or areas of architectural distortion in the axilla at the area of palpable mass.  No suspicious pathologic lymphadenopathy.     Impression: No evidence for malignancy. No discrete abnormality nor pathologic lymphadenopathy in the high right axilla at the area of palpable concern. This patient has been identified as being at elevated risk for development of breast cancer based on the Tyrer-Cuzick model. She may benefit from supplemental screening. ASSESSMENT/BI-RADS CATEGORY: Left: 1 - Negative Right: 1 - Negative Overall: 1 - Negative RECOMMENDATION:      - Clinical management for the right breast.      - Routine screening mammogram at age 42 for both breasts. Workstation ID: [id number]       Transcribed for Krishan Villeda MD, by Tiffany Young on 03/13/24 at 3:34 PM. Powered by Dragon [employer].

## 2024-03-14 LAB — A1AT SERPL-MCNC: 155 MG/DL (ref 100–188)

## 2024-03-19 ENCOUNTER — TELEPHONE (OUTPATIENT)
Age: 34
End: 2024-03-19

## 2024-03-19 ENCOUNTER — APPOINTMENT (OUTPATIENT)
Dept: LAB | Facility: HOSPITAL | Age: 34
End: 2024-03-19
Attending: INTERNAL MEDICINE
Payer: COMMERCIAL

## 2024-03-19 ENCOUNTER — TELEPHONE (OUTPATIENT)
Dept: FAMILY MEDICINE CLINIC | Facility: CLINIC | Age: 34
End: 2024-03-19

## 2024-03-19 ENCOUNTER — APPOINTMENT (OUTPATIENT)
Dept: LAB | Facility: HOSPITAL | Age: 34
End: 2024-03-19
Payer: COMMERCIAL

## 2024-03-19 DIAGNOSIS — M79.7 FIBROMYALGIA: Primary | ICD-10-CM

## 2024-03-19 DIAGNOSIS — K86.89 PANCREATIC INSUFFICIENCY: ICD-10-CM

## 2024-03-19 PROCEDURE — 36415 COLL VENOUS BLD VENIPUNCTURE: CPT

## 2024-03-19 NOTE — TELEPHONE ENCOUNTER
Tylenol/Ibuprofen. She also has Flexeril script. She can schedule appointment with pain management for chronic pain.

## 2024-03-19 NOTE — TELEPHONE ENCOUNTER
Patient left voicemail:     Patient is asking if Yenifer can do anything for her pain due to her fibromyalgia

## 2024-03-19 NOTE — TELEPHONE ENCOUNTER
"Patient aware, states pain management will only send her to PT. Patient states that she needs to see a specialist- per patient: \"who is Yenifer going to recommend\"?  States she has done PT and it does not work, neither does the tylenol/ibuprofen/flexeril.   "

## 2024-03-23 ENCOUNTER — HOSPITAL ENCOUNTER (EMERGENCY)
Facility: HOSPITAL | Age: 34
Discharge: HOME | End: 2024-03-23
Attending: EMERGENCY MEDICINE | Admitting: EMERGENCY MEDICINE
Payer: COMMERCIAL

## 2024-03-23 ENCOUNTER — APPOINTMENT (EMERGENCY)
Dept: CT IMAGING | Facility: HOSPITAL | Age: 34
End: 2024-03-23
Payer: COMMERCIAL

## 2024-03-23 VITALS
SYSTOLIC BLOOD PRESSURE: 139 MMHG | DIASTOLIC BLOOD PRESSURE: 72 MMHG | OXYGEN SATURATION: 98 % | HEART RATE: 63 BPM | RESPIRATION RATE: 18 BRPM | TEMPERATURE: 97.6 F

## 2024-03-23 DIAGNOSIS — K43.9 VENTRAL HERNIA: Primary | ICD-10-CM

## 2024-03-23 LAB
ALBUMIN SERPL BCP-MCNC: 4 G/DL (ref 3.5–5)
ALP SERPL-CCNC: 59 U/L (ref 34–104)
ALT SERPL W P-5'-P-CCNC: 32 U/L (ref 7–52)
AMORPH URATE CRY URNS QL MICRO: ABNORMAL
ANION GAP SERPL CALCULATED.3IONS-SCNC: 8 MMOL/L (ref 4–13)
AST SERPL W P-5'-P-CCNC: 26 U/L (ref 13–39)
BACTERIA UR QL AUTO: ABNORMAL /HPF
BASOPHILS # BLD AUTO: 0.03 THOUSANDS/ÂΜL (ref 0–0.1)
BASOPHILS NFR BLD AUTO: 0 % (ref 0–1)
BILIRUB SERPL-MCNC: 0.39 MG/DL (ref 0.2–1)
BILIRUB UR QL STRIP: NEGATIVE
BUN SERPL-MCNC: 10 MG/DL (ref 5–25)
CALCIUM SERPL-MCNC: 9.3 MG/DL (ref 8.4–10.2)
CAOX CRY URNS QL MICRO: ABNORMAL /HPF
CHLORIDE SERPL-SCNC: 107 MMOL/L (ref 96–108)
CLARITY UR: ABNORMAL
CO2 SERPL-SCNC: 27 MMOL/L (ref 21–32)
COLOR UR: YELLOW
CREAT SERPL-MCNC: 0.42 MG/DL (ref 0.6–1.3)
EOSINOPHIL # BLD AUTO: 0.18 THOUSAND/ÂΜL (ref 0–0.61)
EOSINOPHIL NFR BLD AUTO: 2 % (ref 0–6)
ERYTHROCYTE [DISTWIDTH] IN BLOOD BY AUTOMATED COUNT: 13.4 % (ref 11.6–15.1)
EXT PREGNANCY TEST URINE: NEGATIVE
EXT. CONTROL: NORMAL
GFR SERPL CREATININE-BSD FRML MDRD: 137 ML/MIN/1.73SQ M
GLUCOSE SERPL-MCNC: 137 MG/DL (ref 65–140)
GLUCOSE UR STRIP-MCNC: NEGATIVE MG/DL
HCT VFR BLD AUTO: 38.5 % (ref 34.8–46.1)
HGB BLD-MCNC: 12.4 G/DL (ref 11.5–15.4)
HGB UR QL STRIP.AUTO: NEGATIVE
IMM GRANULOCYTES # BLD AUTO: 0.07 THOUSAND/UL (ref 0–0.2)
IMM GRANULOCYTES NFR BLD AUTO: 1 % (ref 0–2)
KETONES UR STRIP-MCNC: NEGATIVE MG/DL
LEUKOCYTE ESTERASE UR QL STRIP: NEGATIVE
LIPASE SERPL-CCNC: 9 U/L (ref 11–82)
LYMPHOCYTES # BLD AUTO: 2.28 THOUSANDS/ÂΜL (ref 0.6–4.47)
LYMPHOCYTES NFR BLD AUTO: 22 % (ref 14–44)
MCH RBC QN AUTO: 29.4 PG (ref 26.8–34.3)
MCHC RBC AUTO-ENTMCNC: 32.2 G/DL (ref 31.4–37.4)
MCV RBC AUTO: 91 FL (ref 82–98)
MONOCYTES # BLD AUTO: 0.46 THOUSAND/ÂΜL (ref 0.17–1.22)
MONOCYTES NFR BLD AUTO: 4 % (ref 4–12)
MUCOUS THREADS UR QL AUTO: ABNORMAL
NEUTROPHILS # BLD AUTO: 7.33 THOUSANDS/ÂΜL (ref 1.85–7.62)
NEUTS SEG NFR BLD AUTO: 71 % (ref 43–75)
NITRITE UR QL STRIP: NEGATIVE
NON-SQ EPI CELLS URNS QL MICRO: ABNORMAL /HPF
NRBC BLD AUTO-RTO: 0 /100 WBCS
PH UR STRIP.AUTO: 6.5 [PH]
PLATELET # BLD AUTO: 355 THOUSANDS/UL (ref 149–390)
PMV BLD AUTO: 9.8 FL (ref 8.9–12.7)
POTASSIUM SERPL-SCNC: 3.9 MMOL/L (ref 3.5–5.3)
PROT SERPL-MCNC: 7 G/DL (ref 6.4–8.4)
PROT UR STRIP-MCNC: ABNORMAL MG/DL
RBC # BLD AUTO: 4.22 MILLION/UL (ref 3.81–5.12)
RBC #/AREA URNS AUTO: ABNORMAL /HPF
SODIUM SERPL-SCNC: 142 MMOL/L (ref 135–147)
SP GR UR STRIP.AUTO: >=1.03 (ref 1–1.03)
UROBILINOGEN UR STRIP-ACNC: <2 MG/DL
WBC # BLD AUTO: 10.35 THOUSAND/UL (ref 4.31–10.16)
WBC #/AREA URNS AUTO: ABNORMAL /HPF

## 2024-03-23 PROCEDURE — 74177 CT ABD & PELVIS W/CONTRAST: CPT

## 2024-03-23 PROCEDURE — 96374 THER/PROPH/DIAG INJ IV PUSH: CPT

## 2024-03-23 PROCEDURE — 99285 EMERGENCY DEPT VISIT HI MDM: CPT | Performed by: PHYSICIAN ASSISTANT

## 2024-03-23 PROCEDURE — 96361 HYDRATE IV INFUSION ADD-ON: CPT

## 2024-03-23 PROCEDURE — 85025 COMPLETE CBC W/AUTO DIFF WBC: CPT | Performed by: PHYSICIAN ASSISTANT

## 2024-03-23 PROCEDURE — 83690 ASSAY OF LIPASE: CPT | Performed by: PHYSICIAN ASSISTANT

## 2024-03-23 PROCEDURE — 96375 TX/PRO/DX INJ NEW DRUG ADDON: CPT

## 2024-03-23 PROCEDURE — 36415 COLL VENOUS BLD VENIPUNCTURE: CPT | Performed by: PHYSICIAN ASSISTANT

## 2024-03-23 PROCEDURE — 99243 OFF/OP CNSLTJ NEW/EST LOW 30: CPT

## 2024-03-23 PROCEDURE — 80053 COMPREHEN METABOLIC PANEL: CPT | Performed by: PHYSICIAN ASSISTANT

## 2024-03-23 PROCEDURE — 81001 URINALYSIS AUTO W/SCOPE: CPT | Performed by: PHYSICIAN ASSISTANT

## 2024-03-23 PROCEDURE — 81025 URINE PREGNANCY TEST: CPT | Performed by: PHYSICIAN ASSISTANT

## 2024-03-23 PROCEDURE — 99284 EMERGENCY DEPT VISIT MOD MDM: CPT

## 2024-03-23 RX ORDER — ONDANSETRON 2 MG/ML
4 INJECTION INTRAMUSCULAR; INTRAVENOUS ONCE
Status: COMPLETED | OUTPATIENT
Start: 2024-03-23 | End: 2024-03-23

## 2024-03-23 RX ORDER — ONDANSETRON 4 MG/1
4 TABLET, FILM COATED ORAL EVERY 6 HOURS
Qty: 10 TABLET | Refills: 0 | Status: SHIPPED | OUTPATIENT
Start: 2024-03-23 | End: 2024-03-26 | Stop reason: SDUPTHER

## 2024-03-23 RX ORDER — KETOROLAC TROMETHAMINE 30 MG/ML
30 INJECTION, SOLUTION INTRAMUSCULAR; INTRAVENOUS ONCE
Status: COMPLETED | OUTPATIENT
Start: 2024-03-23 | End: 2024-03-23

## 2024-03-23 RX ORDER — ONDANSETRON 4 MG/1
4 TABLET, ORALLY DISINTEGRATING ORAL ONCE
Status: DISCONTINUED | OUTPATIENT
Start: 2024-03-23 | End: 2024-03-23 | Stop reason: HOSPADM

## 2024-03-23 RX ADMIN — KETOROLAC TROMETHAMINE 30 MG: 30 INJECTION, SOLUTION INTRAMUSCULAR; INTRAVENOUS at 15:13

## 2024-03-23 RX ADMIN — ONDANSETRON 4 MG: 2 INJECTION INTRAMUSCULAR; INTRAVENOUS at 15:11

## 2024-03-23 RX ADMIN — IOHEXOL 100 ML: 350 INJECTION, SOLUTION INTRAVENOUS at 15:42

## 2024-03-23 RX ADMIN — SODIUM CHLORIDE 1000 ML: 0.9 INJECTION, SOLUTION INTRAVENOUS at 15:11

## 2024-03-23 NOTE — Clinical Note
Jennifer Johnson was seen and treated in our emergency department on 3/23/2024.                Diagnosis:     Jennifer  .    She may return on this date:     Jennifer Johnson is excused from work through Tuesday march 26th     If you have any questions or concerns, please don't hesitate to call.      Jose Narvaez PA-C    ______________________________           _______________          _______________  Hospital Representative                              Date                                Time

## 2024-03-23 NOTE — CONSULTS
Consultation - General Surgery   Jennifer Johnson 33 y.o. female MRN: [id number]  Unit/Bed#: ED 07 Encounter: [id number]    Assessment/Plan     Assessment:    Fat containing incarcerated umbilical hernia     Admits to RLQ abdominal pain with associated nausea since yesterday evening   No known history of umbilical hernia   Admits to decreased appetite with poor PO intake today   Denies fevers, chills or vomiting   Abdominal exam benign. TTP in RLQ. Soft, active bowel sounds. No guarding, rebound or peritoneal signs. Hernia not palpable, however no overlying skin changes.   Afebrile, VSS  WBC 10.35  Lipase WNL   CT A/P   Approximate 5 cm fat-containing ventral hernia; some of the herniated fat shows mild edema/inflammatory change which may represent fat incarceration/strangulation.    Plan:  No acute surgical intervention needed at this time   Can f/u with surgical group outpatient to schedule elective repair   Provided script for Zofran, encouraged to slowly advance diet as tolerated   Work note provided by ED, patients admits her job requires heavy lifting   Discussed with attending on call     History of Present Illness     HPI:  Jennifer Johnson is a 33 y.o. female with PMHx prediabetes, bipolar depression, obesity, and HTN who presented to ED this afternoon with concerns of RLQ abdominal pain and associated nausea beginning yesterday evening around 10:00pm. Patient mentions she was able to tolerate dinner yesterday evening and denies any N/V. Now admits to decreased appetite this morning and afternoon along with poor oral intake. Patient states she tried eating french fries on her way to ED, however became nauseous and was unable to finish eating. Mentions her pain began as generalized and then became specific to RLQ. Describes pain as sharp and stabbing with intermittent cramping. Has not tried any OTC medications for pain relief. Last normal BM was yesterday evening. Patient also admits her job requires heavy lifting  working as a [employer]ie [employer]. Currently denies any fevers, chills, or vomiting. Denies any headache, SOB, chest pain, or other changes in bladder or bowel habits. Denies any further questions or complaints.     Consults    Review of Systems   Constitutional:  Negative for activity change, chills and fever.   Respiratory:  Negative for chest tightness and shortness of breath.    Cardiovascular:  Negative for chest pain.   Gastrointestinal:  Positive for abdominal pain (RLQ) and nausea. Negative for constipation, diarrhea, rectal pain and vomiting.   Genitourinary:  Negative for difficulty urinating.   Musculoskeletal:  Negative for arthralgias.   Skin:  Negative for color change.   All other systems reviewed and are negative.      Historical Information   Past Medical History:   Diagnosis Date    Asthma     Hypertension     Migraine      Past Surgical History:   Procedure Laterality Date     SECTION      CHOLECYSTECTOMY  2012    KNEE SURGERY       Social History   Social History     Substance and Sexual Activity   Alcohol Use No     Social History     Substance and Sexual Activity   Drug Use No     E-Cigarette/Vaping    E-Cigarette Use Never User      E-Cigarette/Vaping Substances    Nicotine No     THC No     CBD No     Flavoring No     Other No     Unknown No      Social History     Tobacco Use   Smoking Status Former    Current packs/day: 1.00    Average packs/day: 1 pack/day for 20.0 years (20.0 ttl pk-yrs)    Types: Cigarettes   Smokeless Tobacco Never     Family History: non-contributory    Meds/Allergies   all current active meds have been reviewed  No Known Allergies    Objective   First Vitals:   Blood Pressure: 138/83 (24 1432)  Pulse: 69 (24 1432)  Temperature: 98.6 °F (37 °C) (24 1432)  Temp Source: Temporal (24 1432)  Respirations: 20 (24 1432)  SpO2: 98 % (24 1432)    Current Vitals:   Blood Pressure: 139/72 (24 1635)  Pulse: 63 (24  1500)  Temperature: 97.6 °F (36.4 °C) (03/23/24 1450)  Temp Source: Oral (03/23/24 1450)  Respirations: 18 (03/23/24 1500)  SpO2: 98 % (03/23/24 1500)    No intake or output data in the 24 hours ending 03/23/24 1735    Invasive Devices       Peripheral Intravenous Line  Duration             Peripheral IV 03/23/24 Right Antecubital <1 day                    Physical Exam  Vitals reviewed.   Constitutional:       Appearance: She is obese.   HENT:      Head: Normocephalic.      Right Ear: External ear normal.      Left Ear: External ear normal.      Nose: Nose normal.      Mouth/Throat:      Mouth: Mucous membranes are moist.   Eyes:      Pupils: Pupils are equal, round, and reactive to light.   Cardiovascular:      Rate and Rhythm: Normal rate and regular rhythm.      Pulses: Normal pulses.   Pulmonary:      Effort: Pulmonary effort is normal. No respiratory distress.      Breath sounds: Normal breath sounds.   Abdominal:      General: Abdomen is flat. Bowel sounds are normal. There is no distension.      Palpations: Abdomen is soft. There is no mass.      Tenderness: There is abdominal tenderness in the right lower quadrant. There is no guarding or rebound. Negative signs include Rovsing's sign and McBurney's sign.      Hernia: A hernia is present. Hernia is present in the ventral area (Unable to palpate, no overlying skin changes).   Musculoskeletal:         General: Normal range of motion.   Skin:     General: Skin is warm.      Coloration: Skin is not jaundiced.   Neurological:      General: No focal deficit present.      Mental Status: She is alert.   Psychiatric:         Mood and Affect: Mood normal.         Behavior: Behavior normal.         Thought Content: Thought content normal.         Judgment: Judgment normal.         Lab Results: I have personally reviewed pertinent lab results.  , CBC:   Lab Results   Component Value Date    WBC 10.35 (H) 03/23/2024    HGB 12.4 03/23/2024    HCT 38.5 03/23/2024    MCV  91 03/23/2024     03/23/2024    RBC 4.22 03/23/2024    MCH 29.4 03/23/2024    MCHC 32.2 03/23/2024    RDW 13.4 03/23/2024    MPV 9.8 03/23/2024    NRBC 0 03/23/2024   , CMP:   Lab Results   Component Value Date    SODIUM 142 03/23/2024    K 3.9 03/23/2024     03/23/2024    CO2 27 03/23/2024    BUN 10 03/23/2024    CREATININE 0.42 (L) 03/23/2024    CALCIUM 9.3 03/23/2024    AST 26 03/23/2024    ALT 32 03/23/2024    ALKPHOS 59 03/23/2024    EGFR 137 03/23/2024     Imaging: I have personally reviewed pertinent reports.    EKG, Pathology, and Other Studies: I have personally reviewed pertinent reports.      Counseling / Coordination of Care  Total floor / unit time spent today 30 minutes.  Greater than 50% of total time was spent with the patient and / or family counseling and / or coordination of care.  A description of the counseling / coordination of care: 30.

## 2024-03-23 NOTE — DISCHARGE INSTRUCTIONS
Follow up with general surgeon - Dr Jez Mckay.  Call   Monday to schedule appointment    Zofran for nausea/vomiting  Tylenol or ibuprofen (with food) for pain     Return to ED for fever, worsening abdominal pain, vomiting.    Patient is a 73-year-old female comes in with several weeks of feeling tired having chest pressure and shortness of breath on exertion.  Symptoms resolved with rest.  She went for colonoscopy today and was found to be bradycardic down to 30s by anesthesiologist.  Colonoscopy was uneventful and patient came to ED afterwards.  Lying in bed patient has no symptoms other than feeling a little tired.  She had attributed her symptoms to possible anemia after noticing blood in her stool for the last several weeks but has not had any blood testing.    Exam: Pink conjunctive a, cap refill less than 2 seconds, bradycardia, lungs clear to auscultation, no lower extremity edema, no JVD, no calf tenderness, soft nontender abdomen, no acute distress, normal gait  Plan: Labs, EKG, chest x-ray, cardiology consult for permanent pacemaker placement

## 2024-03-23 NOTE — ED PROVIDER NOTES
History  Chief Complaint   Patient presents with    Abdominal Pain     Pt states that she developed right lower quadrant abdominal pain since last night. Denies v,d, and fevers. Denies any bowel or urine complications. Reports some nausea.      Patient is a 34 yo wf with history of HTN, asthma, migraine headache who reports gradual onset 10 pm last of R lateral abdominal pain. Pain continues this morning and is worse than last night. Pt reports decreased appetite and nausea. No fever. No vomiting. No diarrhea or constipation. No urinary symptoms. No back pain or flank pain. States pain is worse with eating and bumps in car ride. No other complaints         Prior to Admission Medications   Prescriptions Last Dose Informant Patient Reported? Taking?   Diclofenac Sodium (Voltaren) 1 %   No No   Sig: Apply 2 g topically 4 (four) times a day for 10 days   LORazepam (Ativan) 0.5 mg tablet  Self No No   Sig: Take 1 tablet (0.5 mg total) by mouth as needed for anxiety As the patient is claustrophobic I would recommend; one tablet of Lorazepam 15 minutes before going into the MRI tube and then 1 tablet just before going into the MRI tube.   OLANZapine (ZyPREXA) 5 mg tablet  Self No No   Sig: TAKE 1 TABLET (5 MG TOTAL) BY MOUTH IN THE MORNING   Pancrelipase, Lip-Prot-Amyl, (Creon) 26681-736561 units CPEP   No No   Sig: Take 1 capsule (36,000 Units total) by mouth 3 (three) times a day with meals   albuterol (PROVENTIL HFA,VENTOLIN HFA) 90 mcg/act inhaler  Self Yes No   Sig: Inhale 1 puff every 6 (six) hours as needed   atorvastatin (LIPITOR) 10 mg tablet   No No   Sig: TAKE 1 TABLET BY MOUTH EVERY DAY   benzonatate (TESSALON) 200 MG capsule  Self No No   Sig: Take 1 capsule (200 mg total) by mouth 3 (three) times a day as needed for cough   Patient not taking: Reported on 11/20/2023   benzonatate (TESSALON) 200 MG capsule  Self No No   Sig: Take 1 capsule (200 mg total) by mouth 3 (three) times a day as needed for cough    cyclobenzaprine (FLEXERIL) 10 mg tablet   No No   Sig: TAKE 1 TABLET BY MOUTH TWICE A DAY AS NEEDED FOR MUSCLE SPASM   fluticasone (FLONASE) 50 mcg/act nasal spray  Self No No   Si spray into each nostril daily   hydrochlorothiazide (HYDRODIURIL) 25 mg tablet  Self No No   Sig: Take 1 tablet (25 mg total) by mouth daily   Patient not taking: Reported on 2023   lisinopril (ZESTRIL) 40 mg tablet   No No   Sig: TAKE 1 TABLET BY MOUTH EVERY DAY   propranolol (INDERAL) 40 mg tablet   Yes No   Sig: Take 40 mg by mouth 2 (two) times a day   topiramate (TOPAMAX) 100 mg tablet  Self No No   Sig: TAKE 1 TABLET BY MOUTH EVERY 12 HOURS   Patient not taking: Reported on 3/13/2024      Facility-Administered Medications: None       Past Medical History:   Diagnosis Date    Asthma     Hypertension     Migraine        Past Surgical History:   Procedure Laterality Date     SECTION      CHOLECYSTECTOMY  2012    KNEE SURGERY         Family History   Problem Relation Age of Onset    Hypertension Mother     Asthma Mother     ADD / ADHD Father     Breast cancer Sister     Colon cancer Neg Hx     Colon polyps Neg Hx      I have reviewed and agree with the history as documented.    E-Cigarette/Vaping    E-Cigarette Use Never User      E-Cigarette/Vaping Substances    Nicotine No     THC No     CBD No     Flavoring No     Other No     Unknown No      Social History     Tobacco Use    Smoking status: Former     Current packs/day: 1.00     Average packs/day: 1 pack/day for 20.0 years (20.0 ttl pk-yrs)     Types: Cigarettes    Smokeless tobacco: Never   Vaping Use    Vaping status: Never Used   Substance Use Topics    Alcohol use: No    Drug use: No       Review of Systems   Constitutional:  Negative for chills and fever.   Respiratory:  Negative for shortness of breath.    Cardiovascular:  Negative for chest pain.   Gastrointestinal:  Positive for abdominal pain and nausea. Negative for constipation, diarrhea and  vomiting.   Genitourinary:  Negative for dysuria, flank pain and hematuria.       Physical Exam  Physical Exam  Vitals and nursing note reviewed.   Constitutional:       General: She is not in acute distress.     Appearance: She is well-developed. She is obese. She is not ill-appearing, toxic-appearing or diaphoretic.   HENT:      Head: Normocephalic and atraumatic.      Mouth/Throat:      Mouth: Mucous membranes are moist.      Pharynx: Oropharynx is clear.   Eyes:      Extraocular Movements: Extraocular movements intact.      Pupils: Pupils are equal, round, and reactive to light.   Cardiovascular:      Rate and Rhythm: Normal rate and regular rhythm.      Heart sounds: Normal heart sounds.   Pulmonary:      Effort: Pulmonary effort is normal.      Breath sounds: Normal breath sounds.   Abdominal:      Palpations: Abdomen is soft.      Tenderness: There is no right CVA tenderness, left CVA tenderness, guarding or rebound. Negative signs include Brock's sign, Rovsing's sign, McBurney's sign and psoas sign.      Comments: Tenderness R mid lateral and lower abdomen.    Skin:     General: Skin is warm and dry.      Capillary Refill: Capillary refill takes less than 2 seconds.   Neurological:      General: No focal deficit present.      Mental Status: She is alert and oriented to person, place, and time.         Vital Signs  ED Triage Vitals [03/23/24 1432]   Temperature Pulse Respirations Blood Pressure SpO2   98.6 °F (37 °C) 69 20 138/83 98 %      Temp Source Heart Rate Source Patient Position - Orthostatic VS BP Location FiO2 (%)   Temporal Monitor Sitting Right arm --      Pain Score       9           Vitals:    03/23/24 1450 03/23/24 1500 03/23/24 1509 03/23/24 1635   BP: (!) 190/108 (!) 178/97 114/53 139/72   Pulse: 67 63     Patient Position - Orthostatic VS: Lying  Lying Lying         Visual Acuity      ED Medications  Medications   ondansetron (ZOFRAN-ODT) dispersible tablet 4 mg (4 mg Oral Handoff 3/23/24  1822)   sodium chloride 0.9 % bolus 1,000 mL (0 mL Intravenous Stopped 3/23/24 1611)   ketorolac (TORADOL) injection 30 mg (30 mg Intravenous Given 3/23/24 1513)   ondansetron (ZOFRAN) injection 4 mg (4 mg Intravenous Given 3/23/24 1511)   iohexol (OMNIPAQUE) 350 MG/ML injection (MULTI-DOSE) 100 mL (100 mL Intravenous Given 3/23/24 1542)       Diagnostic Studies  Results Reviewed       Procedure Component Value Units Date/Time    Urine Microscopic [[id number]]  (Abnormal) Collected: 03/23/24 1513    Lab Status: Final result Specimen: Urine, Clean Catch Updated: 03/23/24 1605     RBC, UA 2-4 /hpf      WBC, UA 1-2 /hpf      Epithelial Cells Occasional /hpf      Bacteria, UA Occasional /hpf      MUCUS THREADS Occasional     Amorphous Crystals, UA Moderate     Ca Oxalate Lena, UA Occasional /hpf     Comprehensive metabolic panel [[id number]]  (Abnormal) Collected: 03/23/24 1503    Lab Status: Final result Specimen: Blood from Arm, Right Updated: 03/23/24 1527     Sodium 142 mmol/L      Potassium 3.9 mmol/L      Chloride 107 mmol/L      CO2 27 mmol/L      ANION GAP 8 mmol/L      BUN 10 mg/dL      Creatinine 0.42 mg/dL      Glucose 137 mg/dL      Calcium 9.3 mg/dL      AST 26 U/L      ALT 32 U/L      Alkaline Phosphatase 59 U/L      Total Protein 7.0 g/dL      Albumin 4.0 g/dL      Total Bilirubin 0.39 mg/dL      eGFR 137 ml/min/1.73sq m     Narrative:      National Kid[employer] guidelines for Chronic Kidney Disease (CKD):     Stage 1 with normal or high GFR (GFR > 90 mL/min/1.73 square meters)    Stage 2 Mild CKD (GFR = 60-89 mL/min/1.73 square meters)    Stage 3A Moderate CKD (GFR = 45-59 mL/min/1.73 square meters)    Stage 3B Moderate CKD (GFR = 30-44 mL/min/1.73 square meters)    Stage 4 Severe CKD (GFR = 15-29 mL/min/1.73 square meters)    Stage 5 End Stage CKD (GFR <15 mL/min/1.73 square meters)  Note: GFR calculation is accurate only with a steady state creatinine    Lipase [[id number]]  (Abnormal)  Collected: 03/23/24 1503    Lab Status: Final result Specimen: Blood from Arm, Right Updated: 03/23/24 1527     Lipase 9 u/L     UA w Reflex to Microscopic w Reflex to Culture [[id number]]  (Abnormal) Collected: 03/23/24 1513    Lab Status: Final result Specimen: Urine, Clean Catch Updated: 03/23/24 1525     Color, UA Yellow     Clarity, UA Slightly Cloudy     Specific Gravity, UA >=1.030     pH, UA 6.5     Leukocytes, UA Negative     Nitrite, UA Negative     Protein, UA Trace mg/dl      Glucose, UA Negative mg/dl      Ketones, UA Negative mg/dl      Urobilinogen, UA <2.0 mg/dl      Bilirubin, UA Negative     Occult Blood, UA Negative    CBC and differential [[id number]]  (Abnormal) Collected: 03/23/24 1503    Lab Status: Final result Specimen: Blood from Arm, Right Updated: 03/23/24 1514     WBC 10.35 Thousand/uL      RBC 4.22 Million/uL      Hemoglobin 12.4 g/dL      Hematocrit 38.5 %      MCV 91 fL      MCH 29.4 pg      MCHC 32.2 g/dL      RDW 13.4 %      MPV 9.8 fL      Platelets 355 Thousands/uL      nRBC 0 /100 WBCs      Neutrophils Relative 71 %      Immature Grans % 1 %      Lymphocytes Relative 22 %      Monocytes Relative 4 %      Eosinophils Relative 2 %      Basophils Relative 0 %      Neutrophils Absolute 7.33 Thousands/µL      Absolute Immature Grans 0.07 Thousand/uL      Absolute Lymphocytes 2.28 Thousands/µL      Absolute Monocytes 0.46 Thousand/µL      Eosinophils Absolute 0.18 Thousand/µL      Basophils Absolute 0.03 Thousands/µL     POCT pregnancy, urine [[id number]]  (Normal) Resulted: 03/23/24 1501    Lab Status: Final result Updated: 03/23/24 1501     EXT Preg Test, Ur Negative     Control Valid                   CT abdomen pelvis with contrast   Final Result by Grant Bull MD (03/23 1645)      Approximate 5 cm fat-containing ventral hernia; some of the herniated fat shows mild edema/inflammatory change which may represent fat incarceration/strangulation.      The appendix is not  inflamed in this patient presenting with right lower quadrant pain.      The study was marked in EPIC for immediate notification.      Workstation performed: [id number]                    Procedures  Procedures         ED Course                                             Medical Decision Making  Differential diagnosis includes, appendicitis, kidney stone, diverticulitis, pancreatitis, other intraabdominal pathology  Labs reviewed. White count slightly elevated at 10.35. CT a/p shows following: Approximate 5 cm fat-containing ventral hernia; some of the herniated fat shows mild edema/inflammatory change which may represent fat incarceration/strangulation.The appendix is not inflamed in this patient presenting with right lower quadrant pain.  I discussed with surgical WHITNEY Perez.   She reviewed with attending surgeon Dr Jez Mckay. They felt patient could follow up closely as outpatient.   Plan is discharge home with Rx zofran. Pt advised to contact Dr Mckay's office Monday to schedule office follow up. She was advised to avoid heavy lifting. Return precautions reviewed including fever, worsening abdominal pain     Amount and/or Complexity of Data Reviewed  Labs: ordered.  Radiology: ordered.    Risk  Prescription drug management.             Disposition  Final diagnoses:   Ventral hernia - fat containing      Time reflects when diagnosis was documented in both MDM as applicable and the Disposition within this note       Time User Action Codes Description Comment    3/23/2024  6:02 PM Jose Narvaez Add [K43.9] Ventral hernia     3/23/2024  6:02 PM Jose Narvaez Modify [K43.9] Ventral hernia fat containing           ED Disposition       ED Disposition   Discharge    Condition   Stable    Date/Time   Sat Mar 23, 2024  6:02 PM    Comment   Jennifer BLISS Sell discharge to home/self care.                   Follow-up Information       Follow up With Specialties Details Why Contact Info    Jez Mckay MD  General Surgery   [address] [facility]  [address]  [facility] [address]  [phone]              Discharge Medication List as of 3/23/2024  6:07 PM        START taking these medications    Details   ondansetron (ZOFRAN) 4 mg tablet Take 1 tablet (4 mg total) by mouth every 6 (six) hours, Starting Sat 3/23/2024, Normal           CONTINUE these medications which have NOT CHANGED    Details   albuterol (PROVENTIL HFA,VENTOLIN HFA) 90 mcg/act inhaler Inhale 1 puff every 6 (six) hours as needed, Historical Med      atorvastatin (LIPITOR) 10 mg tablet TAKE 1 TABLET BY MOUTH EVERY DAY, Starting Tue 2/13/2024, Normal      !! benzonatate (TESSALON) 200 MG capsule Take 1 capsule (200 mg total) by mouth 3 (three) times a day as needed for cough, Starting Fri 11/3/2023, Normal      !! benzonatate (TESSALON) 200 MG capsule Take 1 capsule (200 mg total) by mouth 3 (three) times a day as needed for cough, Starting Tue 1/2/2024, Normal      cyclobenzaprine (FLEXERIL) 10 mg tablet TAKE 1 TABLET BY MOUTH TWICE A DAY AS NEEDED FOR MUSCLE SPASM, Starting Mon 2/19/2024, Normal      Diclofenac Sodium (Voltaren) 1 % Apply 2 g topically 4 (four) times a day for 10 days, Starting Sun 11/19/2023, Until Wed 11/29/2023, Normal      fluticasone (FLONASE) 50 mcg/act nasal spray 1 spray into each nostril daily, Starting Tue 1/2/2024, Normal      hydrochlorothiazide (HYDRODIURIL) 25 mg tablet Take 1 tablet (25 mg total) by mouth daily, Starting Tue 8/1/2023, Normal      lisinopril (ZESTRIL) 40 mg tablet TAKE 1 TABLET BY MOUTH EVERY DAY, Starting Fri 1/26/2024, Normal      LORazepam (Ativan) 0.5 mg tablet Take 1 tablet (0.5 mg total) by mouth as needed for anxiety As the patient is claustrophobic I would recommend; one tablet of Lorazepam 15 minutes before going into the MRI tube and then 1 tablet just before going into the MRI tube., Starting Mon 11/20/2 023, Normal      OLANZapine (ZyPREXA) 5 mg tablet TAKE 1 TABLET (5 MG TOTAL) BY MOUTH IN THE  MORNING, Starting Tue 10/24/2023, Normal      Pancrelipase, Lip-Prot-Amyl, (Creon) 17280-203460 units CPEP Take 1 capsule (36,000 Units total) by mouth 3 (three) times a day with meals, Starting Thu 2/1/2024, Normal      propranolol (INDERAL) 40 mg tablet Take 40 mg by mouth 2 (two) times a day, Historical Med      topiramate (TOPAMAX) 100 mg tablet TAKE 1 TABLET BY MOUTH EVERY 12 HOURS, Starting Wed 11/29/2023, Normal       !! - Potential duplicate medications found. Please discuss with provider.          No discharge procedures on file.    PDMP Review       None            ED Provider  Electronically Signed by             Jose Narvaez PA-C  03/23/24 1526

## 2024-03-24 ENCOUNTER — HOSPITAL ENCOUNTER (EMERGENCY)
Facility: HOSPITAL | Age: 34
Discharge: HOME | End: 2024-03-24
Attending: EMERGENCY MEDICINE
Payer: COMMERCIAL

## 2024-03-24 ENCOUNTER — NURSE TRIAGE (OUTPATIENT)
Dept: OTHER | Facility: OTHER | Age: 34
End: 2024-03-24

## 2024-03-24 VITALS
HEART RATE: 66 BPM | HEIGHT: 62 IN | DIASTOLIC BLOOD PRESSURE: 95 MMHG | BODY MASS INDEX: 53.92 KG/M2 | OXYGEN SATURATION: 96 % | WEIGHT: 293 LBS | SYSTOLIC BLOOD PRESSURE: 142 MMHG | TEMPERATURE: 98.6 F | RESPIRATION RATE: 16 BRPM

## 2024-03-24 DIAGNOSIS — R10.9 ABDOMINAL PAIN: Primary | ICD-10-CM

## 2024-03-24 LAB
ALBUMIN SERPL BCP-MCNC: 4.1 G/DL (ref 3.5–5)
ALP SERPL-CCNC: 63 U/L (ref 34–104)
ALT SERPL W P-5'-P-CCNC: 48 U/L (ref 7–52)
ANION GAP SERPL CALCULATED.3IONS-SCNC: 6 MMOL/L (ref 4–13)
AST SERPL W P-5'-P-CCNC: 35 U/L (ref 13–39)
BASOPHILS # BLD AUTO: 0.03 THOUSANDS/ÂΜL (ref 0–0.1)
BASOPHILS NFR BLD AUTO: 0 % (ref 0–1)
BILIRUB SERPL-MCNC: 0.42 MG/DL (ref 0.2–1)
BUN SERPL-MCNC: 8 MG/DL (ref 5–25)
CALCIUM SERPL-MCNC: 9.4 MG/DL (ref 8.4–10.2)
CHLORIDE SERPL-SCNC: 105 MMOL/L (ref 96–108)
CO2 SERPL-SCNC: 26 MMOL/L (ref 21–32)
CREAT SERPL-MCNC: 0.39 MG/DL (ref 0.6–1.3)
EOSINOPHIL # BLD AUTO: 0.3 THOUSAND/ÂΜL (ref 0–0.61)
EOSINOPHIL NFR BLD AUTO: 3 % (ref 0–6)
ERYTHROCYTE [DISTWIDTH] IN BLOOD BY AUTOMATED COUNT: 13.4 % (ref 11.6–15.1)
EXT PREGNANCY TEST URINE: NEGATIVE
EXT. CONTROL: NORMAL
GFR SERPL CREATININE-BSD FRML MDRD: 140 ML/MIN/1.73SQ M
GLUCOSE SERPL-MCNC: 86 MG/DL (ref 65–140)
HCT VFR BLD AUTO: 38.3 % (ref 34.8–46.1)
HGB BLD-MCNC: 12.3 G/DL (ref 11.5–15.4)
HOLD SPECIMEN: NORMAL
IMM GRANULOCYTES # BLD AUTO: 0.07 THOUSAND/UL (ref 0–0.2)
IMM GRANULOCYTES NFR BLD AUTO: 1 % (ref 0–2)
LACTATE SERPL-SCNC: 0.6 MMOL/L (ref 0.5–2)
LIPASE SERPL-CCNC: 6 U/L (ref 11–82)
LYMPHOCYTES # BLD AUTO: 2.34 THOUSANDS/ÂΜL (ref 0.6–4.47)
LYMPHOCYTES NFR BLD AUTO: 23 % (ref 14–44)
MCH RBC QN AUTO: 29.4 PG (ref 26.8–34.3)
MCHC RBC AUTO-ENTMCNC: 32.1 G/DL (ref 31.4–37.4)
MCV RBC AUTO: 92 FL (ref 82–98)
MONOCYTES # BLD AUTO: 0.63 THOUSAND/ÂΜL (ref 0.17–1.22)
MONOCYTES NFR BLD AUTO: 6 % (ref 4–12)
NEUTROPHILS # BLD AUTO: 6.99 THOUSANDS/ÂΜL (ref 1.85–7.62)
NEUTS SEG NFR BLD AUTO: 67 % (ref 43–75)
NRBC BLD AUTO-RTO: 0 /100 WBCS
PLATELET # BLD AUTO: 392 THOUSANDS/UL (ref 149–390)
PMV BLD AUTO: 9.8 FL (ref 8.9–12.7)
POTASSIUM SERPL-SCNC: 4 MMOL/L (ref 3.5–5.3)
PROT SERPL-MCNC: 7.2 G/DL (ref 6.4–8.4)
RBC # BLD AUTO: 4.18 MILLION/UL (ref 3.81–5.12)
SODIUM SERPL-SCNC: 137 MMOL/L (ref 135–147)
WBC # BLD AUTO: 10.36 THOUSAND/UL (ref 4.31–10.16)

## 2024-03-24 PROCEDURE — 99283 EMERGENCY DEPT VISIT LOW MDM: CPT

## 2024-03-24 PROCEDURE — 83605 ASSAY OF LACTIC ACID: CPT | Performed by: EMERGENCY MEDICINE

## 2024-03-24 PROCEDURE — 36415 COLL VENOUS BLD VENIPUNCTURE: CPT | Performed by: EMERGENCY MEDICINE

## 2024-03-24 PROCEDURE — 99244 OFF/OP CNSLTJ NEW/EST MOD 40: CPT | Performed by: PHYSICIAN ASSISTANT

## 2024-03-24 PROCEDURE — 81025 URINE PREGNANCY TEST: CPT | Performed by: EMERGENCY MEDICINE

## 2024-03-24 PROCEDURE — 80053 COMPREHEN METABOLIC PANEL: CPT | Performed by: EMERGENCY MEDICINE

## 2024-03-24 PROCEDURE — 83690 ASSAY OF LIPASE: CPT | Performed by: EMERGENCY MEDICINE

## 2024-03-24 PROCEDURE — 85025 COMPLETE CBC W/AUTO DIFF WBC: CPT | Performed by: EMERGENCY MEDICINE

## 2024-03-24 PROCEDURE — 99284 EMERGENCY DEPT VISIT MOD MDM: CPT | Performed by: PHYSICIAN ASSISTANT

## 2024-03-24 NOTE — TELEPHONE ENCOUNTER
"Reason for Disposition  • [1] SEVERE pain (e.g., excruciating) AND [2] present > 1 hour    Answer Assessment - Initial Assessment Questions  1. LOCATION: \"Where does it hurt?\"       Entire abdomen    2. RADIATION: \"Does the pain shoot anywhere else?\" (e.g., chest, back)      Denies    3. ONSET: \"When did the pain begin?\" (e.g., minutes, hours or days ago)       Started yesterday. Became worse since yesterday night.     4. SUDDEN: \"Gradual or sudden onset?\"      Gradual    5. PATTERN \"Does the pain come and go, or is it constant?\"     - If constant: \"Is it getting better, staying the same, or worsening?\"       (Note: Constant means the pain never goes away completely; most serious pain is constant and it progresses)      - If intermittent: \"How long does it last?\" \"Do you have pain now?\"      (Note: Intermittent means the pain goes away completely between bouts)      Constant    6. SEVERITY: \"How bad is the pain?\"  (e.g., Scale 1-10; mild, moderate, or severe)    - MILD (1-3): doesn't interfere with normal activities, abdomen soft and not tender to touch     - MODERATE (4-7): interferes with normal activities or awakens from sleep, tender to touch     - SEVERE (8-10): excruciating pain, doubled over, unable to do any normal activities       9/10-  7. RECURRENT SYMPTOM: \"Have you ever had this type of stomach pain before?\" If Yes, ask: \"When was the last time?\" and \"What happened that time?\"       Yes    8. CAUSE: \"What do you think is causing the stomach pain?\"      Hernia    9. RELIEVING/AGGRAVATING FACTORS: \"What makes it better or worse?\" (e.g., movement, antacids, bowel movement)      Nothing makes it better.     10. OTHER SYMPTOMS: \"Has there been any vomiting, diarrhea, constipation, or urine problems?\"        Temp: 104.5 (forehead). No other symptoms.     11. PREGNANCY: \"Is there any chance you are pregnant?\" \"When was your last menstrual period?\"        Denies    Protocols used: Abdominal Pain - " Female-ADULT-AH

## 2024-03-24 NOTE — CONSULTS
Consultation -General Surgery  Jennifer Johnson 33 y.o. female MRN: [id number]  Unit/Bed#: ED 08 Encounter: [id number]            ASSESSMENT:  Patient is a 33 year old female with PMH of HTN, HLD, asthma, migraine, morbid obesity, bipolar disorder with recurrent abdominal pain in the setting of known ventral hernia.  Patient with single episode of fever today.    VSS    WBC - 10.36 (10.35)    CTAP 3/23/24: Approximate 5 cm fat-containing ventral hernia; some of the herniated fat shows mild edema/inflammatory change which may represent fat incarceration/strangulation.  The appendix is not inflamed in this patient presenting with right lower quadrant pain.        Plan:  - recommend outpatient follow-up for further evaluation and treatment recommendations of ventral hernia.  There is no subjective or objective findings to suggest bowel obstruction or incarcerated bowel.  - recommend fever work-up by emergency department, etiology of fever unlikely due to ventral hernia      HPI: Jennifer Johnson is a 33 y.o. year old female who presents with complaints of persistent abdominal pain and new-onset fever.  Patient states that yesterday she was having right upper quadrant abdominal pain and was evaluated in the ED.  Patient underwent CT scan of abdomen which demonstrated 5 cm ventral hernia with strangulated fat only.  Patient was evaluated by surgical team at that time and recommended outpatient follow-up.  Patient states that today she had LUQ radiating to LLQ abdominal pain.  Patient also states that she had a fever of 104.5 that resolved and did not recur about 6 hours prior to arrival.  Patient states that she is passing flatus and having normal bowel movements.  She is tolerating her diet.      Review of Systems   Constitutional:  Positive for fever.   Respiratory:  Negative for shortness of breath.    Cardiovascular:  Negative for chest pain.   Gastrointestinal:  Positive for abdominal pain. Negative for abdominal distention,  "diarrhea and vomiting.   All other systems reviewed and are negative.      Historical Information   Past Medical History:   Diagnosis Date    Asthma     Hypertension     Migraine      Past Surgical History:   Procedure Laterality Date     SECTION  2018    CHOLECYSTECTOMY  2012    KNEE SURGERY       Social History   Social History     Substance and Sexual Activity   Alcohol Use No     Social History     Substance and Sexual Activity   Drug Use Yes    Types: Marijuana     Social History     Tobacco Use   Smoking Status Every Day    Current packs/day: 1.00    Average packs/day: 1 pack/day for 20.0 years (20.0 ttl pk-yrs)    Types: Cigarettes   Smokeless Tobacco Never     Family History   Problem Relation Age of Onset    Hypertension Mother     Asthma Mother     ADD / ADHD Father     Breast cancer Sister     Colon cancer Neg Hx     Colon polyps Neg Hx        Meds/Allergies     (Not in a hospital admission)    No current facility-administered medications for this encounter.       No Known Allergies    Objective     Blood pressure 142/95, pulse 66, temperature 98.6 °F (37 °C), temperature source Oral, resp. rate 16, height 5' 2\" (1.575 m), weight (!) 138 kg (304 lb), last menstrual period 2024, SpO2 96%, unknown if currently breastfeeding.  No intake or output data in the 24 hours ending 24    PHYSICAL EXAM  Physical Exam  Vitals reviewed.   Constitutional:       Appearance: Normal appearance. She is obese.   HENT:      Head: Normocephalic.      Right Ear: External ear normal.      Left Ear: External ear normal.      Nose: Nose normal.      Mouth/Throat:      Mouth: Mucous membranes are moist.   Eyes:      Extraocular Movements: Extraocular movements intact.   Cardiovascular:      Rate and Rhythm: Normal rate and regular rhythm.      Pulses: Normal pulses.      Heart sounds: Normal heart sounds.   Pulmonary:      Effort: Pulmonary effort is normal.      Breath sounds: Normal breath sounds. "   Abdominal:      General: Bowel sounds are normal. There is no distension.      Palpations: Abdomen is soft.      Tenderness: There is no abdominal tenderness. There is no guarding.   Skin:     General: Skin is warm.      Capillary Refill: Capillary refill takes less than 2 seconds.   Neurological:      General: No focal deficit present.      Mental Status: She is alert and oriented to person, place, and time.   Psychiatric:         Mood and Affect: Mood normal.         Behavior: Behavior normal.         Thought Content: Thought content normal.         Judgment: Judgment normal.           Lab Results: I have personally reviewed pertinent lab results.  , CBC:   Lab Results   Component Value Date    WBC 10.36 (H) 03/24/2024    HGB 12.3 03/24/2024    HCT 38.3 03/24/2024    MCV 92 03/24/2024     (H) 03/24/2024    RBC 4.18 03/24/2024    MCH 29.4 03/24/2024    MCHC 32.1 03/24/2024    RDW 13.4 03/24/2024    MPV 9.8 03/24/2024    NRBC 0 03/24/2024   , CMP:   Lab Results   Component Value Date    SODIUM 137 03/24/2024    K 4.0 03/24/2024     03/24/2024    CO2 26 03/24/2024    BUN 8 03/24/2024    CREATININE 0.39 (L) 03/24/2024    CALCIUM 9.4 03/24/2024    AST 35 03/24/2024    ALT 48 03/24/2024    ALKPHOS 63 03/24/2024    EGFR 140 03/24/2024     Imaging: I have personally reviewed pertinent reports.          Counseling / Coordination of Care  Total time spent today  20 minutes. Greater than 50% of total time was spent with the patient and / or family counseling and / or coordination of care.         Layla Aguilar PA-C  3/24/2024 6:39 PM

## 2024-03-24 NOTE — DISCHARGE INSTRUCTIONS
Follow up with DR Mckay. Call office tomorrow to schedule follow up    Follow up with your primary care provider next 2-3 days for recheck if any persistent concerns    Return to ED for worsening abdominal pain, shaking chills, worsening symptoms

## 2024-03-24 NOTE — TELEPHONE ENCOUNTER
"Regarding: Fever, severe abdominal pain  ----- Message from Melissa Prescott sent at 3/24/2024  3:40 PM EDT -----  \" I was seen by ER yesterday for a hernia and now my fever keeps getting higher. \"    "

## 2024-03-24 NOTE — ED PROVIDER NOTES
History  Chief Complaint   Patient presents with    Abdominal Pain     Pt c/o generalized abd pain that has been getting worse every day. Reports being here yesterday for same and dx with hernia. Reports nausea.     Patient is a 33-year-old white female with history of asthma, migraine, hypertension seen here yesterday for right-sided abdominal pain.  She was noted on CT abdomen pelvis to have a 5 cm fat-containing ventral hernia.  She was evaluated by surgical PA in the ER and determined to be okay for discharge home and follow-up with Dr. Mckay in the office.  Patient reports increase in abdominal pain 11 PM last night.  She states the pain is now felt diffusely.  She states her temperature was 104 noon today.  States she took 2 Advil.  She denies ear pain, runny nose, sore throat, cough, shortness of breath, chest pain, urinary symptoms.  She states she has had nausea and has taken 2 Zofran with improvement.  No vomiting.  No diarrhea.        Prior to Admission Medications   Prescriptions Last Dose Informant Patient Reported? Taking?   Diclofenac Sodium (Voltaren) 1 %   No No   Sig: Apply 2 g topically 4 (four) times a day for 10 days   LORazepam (Ativan) 0.5 mg tablet  Self No No   Sig: Take 1 tablet (0.5 mg total) by mouth as needed for anxiety As the patient is claustrophobic I would recommend; one tablet of Lorazepam 15 minutes before going into the MRI tube and then 1 tablet just before going into the MRI tube.   OLANZapine (ZyPREXA) 5 mg tablet  Self No No   Sig: TAKE 1 TABLET (5 MG TOTAL) BY MOUTH IN THE MORNING   Pancrelipase, Lip-Prot-Amyl, (Creon) 03233-561876 units CPEP   No No   Sig: Take 1 capsule (36,000 Units total) by mouth 3 (three) times a day with meals   albuterol (PROVENTIL HFA,VENTOLIN HFA) 90 mcg/act inhaler  Self Yes No   Sig: Inhale 1 puff every 6 (six) hours as needed   atorvastatin (LIPITOR) 10 mg tablet   No No   Sig: TAKE 1 TABLET BY MOUTH EVERY DAY   benzonatate (TESSALON) 200 MG  capsule  Self No No   Sig: Take 1 capsule (200 mg total) by mouth 3 (three) times a day as needed for cough   Patient not taking: Reported on 2023   benzonatate (TESSALON) 200 MG capsule  Self No No   Sig: Take 1 capsule (200 mg total) by mouth 3 (three) times a day as needed for cough   cyclobenzaprine (FLEXERIL) 10 mg tablet   No No   Sig: TAKE 1 TABLET BY MOUTH TWICE A DAY AS NEEDED FOR MUSCLE SPASM   fluticasone (FLONASE) 50 mcg/act nasal spray  Self No No   Si spray into each nostril daily   hydrochlorothiazide (HYDRODIURIL) 25 mg tablet  Self No No   Sig: Take 1 tablet (25 mg total) by mouth daily   Patient not taking: Reported on 2023   lisinopril (ZESTRIL) 40 mg tablet   No No   Sig: TAKE 1 TABLET BY MOUTH EVERY DAY   ondansetron (ZOFRAN) 4 mg tablet   No No   Sig: Take 1 tablet (4 mg total) by mouth every 6 (six) hours   propranolol (INDERAL) 40 mg tablet   Yes No   Sig: Take 40 mg by mouth 2 (two) times a day   topiramate (TOPAMAX) 100 mg tablet  Self No No   Sig: TAKE 1 TABLET BY MOUTH EVERY 12 HOURS   Patient not taking: Reported on 3/13/2024      Facility-Administered Medications: None       Past Medical History:   Diagnosis Date    Asthma     Hypertension     Migraine        Past Surgical History:   Procedure Laterality Date     SECTION  2018    CHOLECYSTECTOMY  2012    KNEE SURGERY         Family History   Problem Relation Age of Onset    Hypertension Mother     Asthma Mother     ADD / ADHD Father     Breast cancer Sister     Colon cancer Neg Hx     Colon polyps Neg Hx      I have reviewed and agree with the history as documented.    E-Cigarette/Vaping    E-Cigarette Use Current Every Day User      E-Cigarette/Vaping Substances    Nicotine Yes     THC No     CBD No     Flavoring No     Other No     Unknown No      Social History     Tobacco Use    Smoking status: Every Day     Current packs/day: 1.00     Average packs/day: 1 pack/day for 20.0 years (20.0 ttl pk-yrs)      Types: Cigarettes    Smokeless tobacco: Never   Vaping Use    Vaping status: Every Day    Substances: Nicotine   Substance Use Topics    Alcohol use: No    Drug use: Yes     Types: Marijuana       Review of Systems   Constitutional:  Negative for chills and fever.   HENT:  Negative for congestion, rhinorrhea and sore throat.    Respiratory:  Negative for cough and shortness of breath.    Cardiovascular:  Negative for chest pain.   Gastrointestinal:  Positive for abdominal pain and nausea. Negative for constipation, diarrhea and vomiting.   Genitourinary:  Negative for dysuria and flank pain.   Neurological:  Negative for headaches.       Physical Exam  Physical Exam  Vitals and nursing note reviewed.   Constitutional:       General: She is not in acute distress.     Appearance: She is well-developed. She is obese. She is not ill-appearing, toxic-appearing or diaphoretic.   HENT:      Head: Normocephalic and atraumatic.      Mouth/Throat:      Mouth: Mucous membranes are moist.      Pharynx: Oropharynx is clear.   Eyes:      Extraocular Movements: Extraocular movements intact.      Pupils: Pupils are equal, round, and reactive to light.   Cardiovascular:      Rate and Rhythm: Normal rate and regular rhythm.      Heart sounds: Normal heart sounds.   Pulmonary:      Effort: Pulmonary effort is normal.      Breath sounds: Normal breath sounds.   Abdominal:      General: Bowel sounds are normal.      Tenderness: There is no right CVA tenderness, left CVA tenderness, guarding or rebound. Negative signs include Brock's sign, Rovsing's sign, McBurney's sign and psoas sign.      Comments: Mild tenderness to deep palpation L upper and L lower abdomen   Skin:     General: Skin is warm and dry.      Capillary Refill: Capillary refill takes less than 2 seconds.   Neurological:      Mental Status: She is alert.         Vital Signs  ED Triage Vitals   Temperature Pulse Respirations Blood Pressure SpO2   03/24/24 1631 03/24/24  1631 03/24/24 1631 03/24/24 1631 03/24/24 1631   98.2 °F (36.8 °C) (!) 53 18 (!) 188/81 96 %      Temp Source Heart Rate Source Patient Position - Orthostatic VS BP Location FiO2 (%)   03/24/24 1631 03/24/24 1631 03/24/24 1756 03/24/24 1631 --   Oral Monitor Lying Right arm       Pain Score       03/24/24 1631       9           Vitals:    03/24/24 1631 03/24/24 1756   BP: (!) 188/81 142/95   Pulse: (!) 53 66   Patient Position - Orthostatic VS:  Lying         Visual Acuity      ED Medications  Medications - No data to display    Diagnostic Studies  Results Reviewed       Procedure Component Value Units Date/Time    [place] draw [[id number]] Collected: 03/24/24 1639    Lab Status: Final result Specimen: Blood from Arm, Left Updated: 03/24/24 1801    Narrative:      The following orders were created for panel order [place] draw.  Procedure                               Abnormality         Status                     ---------                               -----------         ------                     Light Blue Top on hold[[id number]]                           Final result               Gold top on hold[[id number]]                                 Final result               Green / Black tube on hold[[id number]]                       Final result                 Please view results for these tests on the individual orders.    POCT pregnancy, urine [[id number]]  (Normal) Resulted: 03/24/24 1740    Lab Status: Final result Specimen: Urine Updated: 03/24/24 1740     EXT Preg Test, Ur Negative     Control Valid    UA (URINE) with reflex to Scope [[id number]] Updated: 03/24/24 1739    Lab Status: No result Specimen: Urine, Clean Catch     Comprehensive metabolic panel [[id number]]  (Abnormal) Collected: 03/24/24 1639    Lab Status: Final result Specimen: Blood from Arm, Left Updated: 03/24/24 1703     Sodium 137 mmol/L      Potassium 4.0 mmol/L      Chloride 105 mmol/L      CO2 26 mmol/L      ANION GAP 6 mmol/L      BUN 8 mg/dL       Creatinine 0.39 mg/dL      Glucose 86 mg/dL      Calcium 9.4 mg/dL      AST 35 U/L      ALT 48 U/L      Alkaline Phosphatase 63 U/L      Total Protein 7.2 g/dL      Albumin 4.1 g/dL      Total Bilirubin 0.42 mg/dL      eGFR 140 ml/min/1.73sq m     Narrative:      National Kid[employer] Foundation guidelines for Chronic Kidney Disease (CKD):     Stage 1 with normal or high GFR (GFR > 90 mL/min/1.73 square meters)    Stage 2 Mild CKD (GFR = 60-89 mL/min/1.73 square meters)    Stage 3A Moderate CKD (GFR = 45-59 mL/min/1.73 square meters)    Stage 3B Moderate CKD (GFR = 30-44 mL/min/1.73 square meters)    Stage 4 Severe CKD (GFR = 15-29 mL/min/1.73 square meters)    Stage 5 End Stage CKD (GFR <15 mL/min/1.73 square meters)  Note: GFR calculation is accurate only with a steady state creatinine    Lipase [[id number]]  (Abnormal) Collected: 03/24/24 1639    Lab Status: Final result Specimen: Blood from Arm, Left Updated: 03/24/24 1703     Lipase 6 u/L     Lactic acid, plasma (w/reflex if result > 2.0) [[id number]]  (Normal) Collected: 03/24/24 1639    Lab Status: Final result Specimen: Blood from Arm, Left Updated: 03/24/24 1703     LACTIC ACID 0.6 mmol/L     Narrative:      Result may be elevated if tourniquet was used during collection.    CBC and differential [[id number]]  (Abnormal) Collected: 03/24/24 1639    Lab Status: Final result Specimen: Blood from Arm, Left Updated: 03/24/24 1647     WBC 10.36 Thousand/uL      RBC 4.18 Million/uL      Hemoglobin 12.3 g/dL      Hematocrit 38.3 %      MCV 92 fL      MCH 29.4 pg      MCHC 32.1 g/dL      RDW 13.4 %      MPV 9.8 fL      Platelets 392 Thousands/uL      nRBC 0 /100 WBCs      Neutrophils Relative 67 %      Immature Grans % 1 %      Lymphocytes Relative 23 %      Monocytes Relative 6 %      Eosinophils Relative 3 %      Basophils Relative 0 %      Neutrophils Absolute 6.99 Thousands/µL      Absolute Immature Grans 0.07 Thousand/uL      Absolute Lymphocytes 2.34  Thousands/µL      Absolute Monocytes 0.63 Thousand/µL      Eosinophils Absolute 0.30 Thousand/µL      Basophils Absolute 0.03 Thousands/µL                    No orders to display              Procedures  Procedures         ED Course                                             Medical Decision Making  I brader texted general surgeon Dr Mckay and surgical WHITNEY, who evaluated patient in the ED. She feels patient can follow up as outpatient with Dr Mckay. Labs reviewed. No acute change from yesterday's lab work. Pt denies cough, sob, URI symptoms. She did not want any further work up of fever in the ED and asked to be discharged after being seen by surgical PA-C. Plan is to have patient follow up with Dr Mckay's office by calling tomorrow to schedule follow up. She was also advised to follow up with her PCP next 48 hours for recheck if any persistent concerns. Pt had no fever in the ED and had taken ibuprofen >6 hours prior to coming to the ED. Return precautions reviewed with patient     Amount and/or Complexity of Data Reviewed  Labs: ordered.             Disposition  Final diagnoses:   Abdominal pain     Time reflects when diagnosis was documented in both MDM as applicable and the Disposition within this note       Time User Action Codes Description Comment    3/24/2024  6:33 PM Jose Narvaez Add [R10.9] Abdominal pain           ED Disposition       ED Disposition   Discharge    Condition   Stable    Date/Time   Sun Mar 24, 2024  6:33 PM    Comment   Jennifer Johnson discharge to home/self care.                   Follow-up Information       Follow up With Specialties Details Why Contact Info    [facility]   [address]  [address]  [place] [address]  [phone]      Jez Mckay MD General Surgery   [address] [facility]  [address]  [place] [address]  [phone]              Discharge Medication List as of 3/24/2024  6:36 PM        CONTINUE these medications which have NOT CHANGED     Details   albuterol (PROVENTIL HFA,VENTOLIN HFA) 90 mcg/act inhaler Inhale 1 puff every 6 (six) hours as needed, Historical Med      atorvastatin (LIPITOR) 10 mg tablet TAKE 1 TABLET BY MOUTH EVERY DAY, Starting Tue 2/13/2024, Normal      !! benzonatate (TESSALON) 200 MG capsule Take 1 capsule (200 mg total) by mouth 3 (three) times a day as needed for cough, Starting Fri 11/3/2023, Normal      !! benzonatate (TESSALON) 200 MG capsule Take 1 capsule (200 mg total) by mouth 3 (three) times a day as needed for cough, Starting Tue 1/2/2024, Normal      cyclobenzaprine (FLEXERIL) 10 mg tablet TAKE 1 TABLET BY MOUTH TWICE A DAY AS NEEDED FOR MUSCLE SPASM, Starting Mon 2/19/2024, Normal      Diclofenac Sodium (Voltaren) 1 % Apply 2 g topically 4 (four) times a day for 10 days, Starting Sun 11/19/2023, Until Wed 11/29/2023, Normal      fluticasone (FLONASE) 50 mcg/act nasal spray 1 spray into each nostril daily, Starting Tue 1/2/2024, Normal      hydrochlorothiazide (HYDRODIURIL) 25 mg tablet Take 1 tablet (25 mg total) by mouth daily, Starting Tue 8/1/2023, Normal      lisinopril (ZESTRIL) 40 mg tablet TAKE 1 TABLET BY MOUTH EVERY DAY, Starting Fri 1/26/2024, Normal      LORazepam (Ativan) 0.5 mg tablet Take 1 tablet (0.5 mg total) by mouth as needed for anxiety As the patient is claustrophobic I would recommend; one tablet of Lorazepam 15 minutes before going into the MRI tube and then 1 tablet just before going into the MRI tube., Starting Mon 11/20/2 023, Normal      OLANZapine (ZyPREXA) 5 mg tablet TAKE 1 TABLET (5 MG TOTAL) BY MOUTH IN THE MORNING, Starting Tue 10/24/2023, Normal      ondansetron (ZOFRAN) 4 mg tablet Take 1 tablet (4 mg total) by mouth every 6 (six) hours, Starting Sat 3/23/2024, Normal      Pancrelipase, Lip-Prot-Amyl, (Creon) 49843-071390 units CPEP Take 1 capsule (36,000 Units total) by mouth 3 (three) times a day with meals, Starting Thu 2/1/2024, Normal      propranolol (INDERAL) 40 mg tablet  Take 40 mg by mouth 2 (two) times a day, Historical Med      topiramate (TOPAMAX) 100 mg tablet TAKE 1 TABLET BY MOUTH EVERY 12 HOURS, Starting Wed 11/29/2023, Normal       !! - Potential duplicate medications found. Please discuss with provider.          No discharge procedures on file.    PDMP Review       None            ED Provider  Electronically Signed by             Jose Narvaez PA-C  03/24/24 1946

## 2024-03-25 ENCOUNTER — TELEPHONE (OUTPATIENT)
Age: 34
End: 2024-03-25

## 2024-03-25 NOTE — TELEPHONE ENCOUNTER
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

## 2024-03-26 ENCOUNTER — TELEPHONE (OUTPATIENT)
Age: 34
End: 2024-03-26

## 2024-03-26 ENCOUNTER — OFFICE VISIT (OUTPATIENT)
Dept: FAMILY MEDICINE CLINIC | Facility: CLINIC | Age: 34
End: 2024-03-26
Payer: COMMERCIAL

## 2024-03-26 VITALS
HEART RATE: 50 BPM | SYSTOLIC BLOOD PRESSURE: 125 MMHG | OXYGEN SATURATION: 97 % | TEMPERATURE: 96.7 F | DIASTOLIC BLOOD PRESSURE: 87 MMHG | WEIGHT: 293 LBS | BODY MASS INDEX: 55.24 KG/M2

## 2024-03-26 DIAGNOSIS — K43.9 VENTRAL HERNIA: Primary | ICD-10-CM

## 2024-03-26 PROCEDURE — 99213 OFFICE O/P EST LOW 20 MIN: CPT | Performed by: NURSE PRACTITIONER

## 2024-03-26 RX ORDER — ONDANSETRON 4 MG/1
4 TABLET, FILM COATED ORAL EVERY 8 HOURS PRN
Qty: 20 TABLET | Refills: 0 | Status: SHIPPED | OUTPATIENT
Start: 2024-03-26

## 2024-03-26 RX ORDER — OLANZAPINE 2.5 MG/1
2.5 TABLET, FILM COATED ORAL
COMMUNITY
Start: 2024-03-20

## 2024-03-26 NOTE — TELEPHONE ENCOUNTER
Patient called had appointment 04/12/2024 and she is calling for sooner appointment, I called office Kerrie offered 03/28/2024 at 2:30 pm at [place] office with Dr Talavera. Patient awared.

## 2024-03-26 NOTE — PROGRESS NOTES
[facility]        NAME: Jennifer Johnson is a 33 y.o. female  : 1990    MRN: [id number]  DATE: 2024  TIME: 10:10 AM    Assessment and Plan   Ventral hernia [K43.9]  1. Ventral hernia  ondansetron (ZOFRAN) 4 mg tablet    fat containing             Patient Instructions     Patient Instructions   F/up with Dr Mckay as scheduled   Continue ibuprofen/tylenol as directed for pain  Should pain get worse go to ER   Zofran as directed for nausea          Chief Complaint     Chief Complaint   Patient presents with    Follow-up     ER visit         History of Present Illness       F/U from ER was seen at [facility] on 3/24/24 for right-sided abdominal pain.  She was noted on CT abdomen pelvis to have a 5 cm fat-containing ventral hernia.  She was evaluated by surgical PA  determined to be okay for discharge home and follow-up with Dr. Mckay in the office on . She is seen today looking for something stronger for pain, and needs a refill on Zofran.         Review of Systems   Review of Systems   Constitutional:  Negative for activity change, appetite change, fatigue and fever.   HENT:  Negative for congestion, sinus pressure and sore throat.    Eyes:  Negative for pain, discharge and visual disturbance.   Respiratory:  Negative for cough, chest tightness, shortness of breath and wheezing.    Cardiovascular:  Negative for chest pain, palpitations and leg swelling.   Gastrointestinal:  Positive for abdominal pain and nausea. Negative for abdominal distention, constipation and diarrhea.   Endocrine: Negative for polydipsia, polyphagia and polyuria.   Genitourinary:  Negative for difficulty urinating, dysuria, frequency and urgency.   Musculoskeletal:  Negative for back pain, gait problem, joint swelling, myalgias and neck stiffness.   Skin:  Negative for color change.   Neurological:  Negative for dizziness, syncope, speech difficulty, weakness and headaches.    Hematological:  Negative for adenopathy. Does not bruise/bleed easily.   Psychiatric/Behavioral:  Negative for agitation, behavioral problems, confusion and hallucinations. The patient is not nervous/anxious.          Current Medications       Current Outpatient Medications:     albuterol (PROVENTIL HFA,VENTOLIN HFA) 90 mcg/act inhaler, Inhale 1 puff every 6 (six) hours as needed, Disp: , Rfl:     atorvastatin (LIPITOR) 10 mg tablet, TAKE 1 TABLET BY MOUTH EVERY DAY, Disp: 90 tablet, Rfl: 1    cyclobenzaprine (FLEXERIL) 10 mg tablet, TAKE 1 TABLET BY MOUTH TWICE A DAY AS NEEDED FOR MUSCLE SPASM, Disp: 30 tablet, Rfl: 0    lisinopril (ZESTRIL) 40 mg tablet, TAKE 1 TABLET BY MOUTH EVERY DAY, Disp: 90 tablet, Rfl: 1    OLANZapine (ZyPREXA) 2.5 mg tablet, Take 2.5 mg by mouth daily at bedtime, Disp: , Rfl:     ondansetron (ZOFRAN) 4 mg tablet, Take 1 tablet (4 mg total) by mouth every 8 (eight) hours as needed for nausea or vomiting, Disp: 20 tablet, Rfl: 0    Pancrelipase, Lip-Prot-Amyl, (Creon) 33501-177154 units CPEP, Take 1 capsule (36,000 Units total) by mouth 3 (three) times a day with meals, Disp: 90 capsule, Rfl: 1    propranolol (INDERAL) 40 mg tablet, Take 40 mg by mouth 2 (two) times a day, Disp: , Rfl:     Current Allergies     Allergies as of 2024    (No Known Allergies)            The following portions of the patient's history were reviewed and updated as appropriate: allergies, current medications, past family history, past medical history, past social history, past surgical history and problem list.     Past Medical History:   Diagnosis Date    Asthma     Hypertension     Migraine        Past Surgical History:   Procedure Laterality Date     SECTION      CHOLECYSTECTOMY      KNEE SURGERY         Family History   Problem Relation Age of Onset    Hypertension Mother     Asthma Mother     ADD / ADHD Father     Breast cancer Sister     Colon cancer Neg Hx     Colon polyps Neg Hx           Medications have been verified.        Objective   /87 (BP Location: Left arm, Patient Position: Sitting, Cuff Size: Standard)   Pulse (!) 50   Temp (!) 96.7 °F (35.9 °C) (Tympanic)   Wt (!) 137 kg (302 lb)   LMP 02/14/2024 (Exact Date)   SpO2 97%   BMI 55.24 kg/m²        Physical Exam     Physical Exam  Vitals and nursing note reviewed.   Constitutional:       General: She is not in acute distress.     Appearance: Normal appearance. She is well-developed. She is obese. She is not diaphoretic.   HENT:      Head: Normocephalic and atraumatic.      Right Ear: Tympanic membrane, ear canal and external ear normal.      Left Ear: Tympanic membrane, ear canal and external ear normal.      Nose: Nose normal. No congestion or rhinorrhea.      Right Sinus: No maxillary sinus tenderness or frontal sinus tenderness.      Left Sinus: No maxillary sinus tenderness or frontal sinus tenderness.      Mouth/Throat:      Mouth: Mucous membranes are moist.      Pharynx: Uvula midline. No oropharyngeal exudate.   Eyes:      General:         Right eye: No discharge.         Left eye: No discharge.      Conjunctiva/sclera: Conjunctivae normal.      Pupils: Pupils are equal, round, and reactive to light.   Neck:      Thyroid: No thyromegaly.      Trachea: No tracheal deviation.   Cardiovascular:      Rate and Rhythm: Normal rate and regular rhythm.      Heart sounds: Normal heart sounds. No murmur heard.     No friction rub. No gallop.   Pulmonary:      Effort: Pulmonary effort is normal. No respiratory distress.      Breath sounds: Normal breath sounds. No stridor. No wheezing, rhonchi or rales.   Chest:      Chest wall: No tenderness.   Abdominal:      General: Bowel sounds are normal. There is no distension.      Palpations: Abdomen is soft. There is no mass.      Tenderness: There is abdominal tenderness. There is no guarding or rebound.      Hernia: A hernia is present.   Musculoskeletal:         General: No  tenderness or deformity. Normal range of motion.      Cervical back: Normal range of motion and neck supple.      Right lower leg: No edema.      Left lower leg: No edema.   Lymphadenopathy:      Cervical: No cervical adenopathy.   Skin:     General: Skin is warm and dry.      Capillary Refill: Capillary refill takes less than 2 seconds.      Coloration: Skin is not jaundiced.      Findings: No bruising, erythema, lesion or rash.   Neurological:      Mental Status: She is alert and oriented to person, place, and time.      Cranial Nerves: No cranial nerve deficit.      Coordination: Coordination normal.   Psychiatric:         Mood and Affect: Mood normal.         Speech: Speech normal.         Behavior: Behavior normal.         Thought Content: Thought content normal.         Judgment: Judgment normal.

## 2024-03-26 NOTE — PATIENT INSTRUCTIONS
F/up with Dr Mckay as scheduled 4/12  Continue ibuprofen/tylenol as directed for pain  Should pain get worse go to ER   Zofran as directed for nausea

## 2024-03-28 ENCOUNTER — NURSE TRIAGE (OUTPATIENT)
Dept: SURGERY | Facility: HOSPITAL | Age: 34
End: 2024-03-28

## 2024-03-28 ENCOUNTER — OFFICE VISIT (OUTPATIENT)
Dept: SURGERY | Facility: HOSPITAL | Age: 34
End: 2024-03-28

## 2024-03-28 VITALS
SYSTOLIC BLOOD PRESSURE: 125 MMHG | RESPIRATION RATE: 16 BRPM | BODY MASS INDEX: 53.92 KG/M2 | WEIGHT: 293 LBS | HEIGHT: 62 IN | TEMPERATURE: 97.6 F | DIASTOLIC BLOOD PRESSURE: 84 MMHG | HEART RATE: 54 BPM | OXYGEN SATURATION: 97 %

## 2024-03-28 DIAGNOSIS — K42.9 UMBILICAL HERNIA WITHOUT OBSTRUCTION AND WITHOUT GANGRENE: Primary | ICD-10-CM

## 2024-03-28 NOTE — PROGRESS NOTES
Assessment/Plan:   Jennifer Johnson is a 33 y.o.female who is here for New Patient Visit (NEW PATIENT- UMBILICAL HERNIA//PT is here as a n/p for a umb hernia. Experiences a constant  dull/sharp pain all over her stomach, similar pain to when she had gallbladder issue. This pain does make her nauseous at times and she got sick on 03/25/24.   PT has been having a low appetite. )    The patient was seen at the ER where a CT scan was done showing a small, fat-containing umbilical hernia. The patient was explained that the symptoms that she is experiencing are unlikely to be from the hernia. The pt has follow-up with a gastroenterologist to address her chronic abdominal pain concerns. The patient was also explained that because of her weight, she is at increased risk for recurrence and that fixing the hernia now as an elective repair would not be advisable given her morbid obesity. I suggested she attempt to lose weight before hernia repair. The patient reported that she has an appointment with a bariatric surgeon in May.     Before further discussion could be had, the patient became angry at the above discussion, got up, and left clinic before the visit was concluded.     CT scan from 3/23/24 was reviewed prior to visit.  Lab work from 3/23/24-3/24/24 was reviewed.   Family medicine note from 3/26/24 reviewed. PT requested additional pain medications.       Plan:     - Recommend weight loss prior to elective umbilical hernia repair. Pt to see bariatric surgery in May.   - Recommend GI visit for workup of chronic abdominal pain. Pt to see GI soon.   - Pt may follow-up with our office after appropriate weight loss for elective umbilical hernia repair.        HPI:  Jennifer Johnson is a 33 y.o.female who was referred for evaluation of New Patient Visit (NEW PATIENT- UMBILICAL HERNIA//PT is here as a n/p for a umb hernia. Experiences a constant  dull/sharp pain all over her stomach, similar pain to when she had gallbladder issue.  "This pain does make her nauseous at times and she got sick on 03/25/24.   PT has been having a low appetite. )  .  PT reports that on Friday/Saturday this previous week she felt bad after smoking marijuana. She had increased discomfort in  her \"appendix\" and decided to proceed to the ER for evaluation. They performed a CT scan and told her she had a umbilical hernia.    ROS:  General ROS: negative  negative for - chills, fatigue, fever or night sweats, weight loss  Respiratory ROS: no cough, shortness of breath, or wheezing  Cardiovascular ROS: no chest pain or dyspnea on exertion  Genito-Urinary ROS: no dysuria, trouble voiding, or hematuria  Musculoskeletal ROS: negative for - gait disturbance, joint pain or muscle pain  Neurological ROS: no TIA or stroke symptoms  nausea      Patient has no known allergies.    Current Outpatient Medications:     albuterol (PROVENTIL HFA,VENTOLIN HFA) 90 mcg/act inhaler, Inhale 1 puff every 6 (six) hours as needed, Disp: , Rfl:     atorvastatin (LIPITOR) 10 mg tablet, TAKE 1 TABLET BY MOUTH EVERY DAY, Disp: 90 tablet, Rfl: 1    cyclobenzaprine (FLEXERIL) 10 mg tablet, TAKE 1 TABLET BY MOUTH TWICE A DAY AS NEEDED FOR MUSCLE SPASM, Disp: 30 tablet, Rfl: 0    lisinopril (ZESTRIL) 40 mg tablet, TAKE 1 TABLET BY MOUTH EVERY DAY, Disp: 90 tablet, Rfl: 1    OLANZapine (ZyPREXA) 2.5 mg tablet, Take 2.5 mg by mouth daily at bedtime, Disp: , Rfl:     ondansetron (ZOFRAN) 4 mg tablet, Take 1 tablet (4 mg total) by mouth every 8 (eight) hours as needed for nausea or vomiting, Disp: 20 tablet, Rfl: 0    Pancrelipase, Lip-Prot-Amyl, (Creon) 44067-107419 units CPEP, Take 1 capsule (36,000 Units total) by mouth 3 (three) times a day with meals, Disp: 90 capsule, Rfl: 1    propranolol (INDERAL) 40 mg tablet, Take 40 mg by mouth 2 (two) times a day, Disp: , Rfl:   Past Medical History:   Diagnosis Date    Asthma     Hypertension     Migraine      Past Surgical History:   Procedure Laterality Date " "    SECTION  2018    CHOLECYSTECTOMY  2012    KNEE SURGERY       Family History   Problem Relation Age of Onset    Hypertension Mother     Asthma Mother     ADD / ADHD Father     Breast cancer Sister     Colon cancer Neg Hx     Colon polyps Neg Hx       reports that she has been smoking cigarettes. She has a 20 pack-year smoking history. She has never used smokeless tobacco. She reports current drug use. Drug: Marijuana. She reports that she does not drink alcohol.    Labs:   Lab Results   Component Value Date    WBC 10.36 (H) 2024    WBC 10.35 (H) 2024    WBC 9.45 2024    HGB 12.3 2024    HGB 12.4 2024    HGB 12.7 2024     (H) 2024     2024     2024     Lab Results   Component Value Date    ALT 48 2024    ALT 32 2024    ALT 24 2024    ALT 15 2023    ALT 21 2021    ALT 12 2020    ALT 17 2020    AST 35 2024    AST 26 2024    AST 20 2024    AST 14 2023    AST 15 2021    AST 9 2020    AST 12 2020     This SmartLink has not been configured with any valid records.       PHYSICAL EXAM  Physical exam unable to be performed as the patient became angry with the discussion and left the clinic before the visit was concluded.              Some portions of this record may have been generated with voice recognition software. There may be translation, syntax,  or grammatical errors. Occasional wrong word or \"sound-a-like\" substitutions may have occurred due to the inherent limitations of the voice recognition software. Read the chart carefully and recognize, using context, where substitutions may have occurred. If you have any questions, please contact the dictating provider for clarification or correction, as needed. This encounter has been coded by a non-certified coder.   "

## 2024-03-28 NOTE — TELEPHONE ENCOUNTER
"Pt of Dr. Fournier-seen today for umbilical hernia.    Pt requesting that CT report be faxed to [facility] at [phone].  Pt states she will \"no longer be coming back to [facility]\"     [phone].      Answer Assessment - Initial Assessment Questions  1. REASON FOR CALL or QUESTION: \"What is your reason for calling today?\" or \"How can I best help you?\" or \"What question do you have that I can help answer?\"      Fax  CT report to [facility]    Protocols used: Information Only Call - No Triage-ADULT-[place]    "

## 2024-04-02 ENCOUNTER — OFFICE VISIT (OUTPATIENT)
Dept: URGENT CARE | Facility: CLINIC | Age: 34
End: 2024-04-02
Payer: COMMERCIAL

## 2024-04-02 VITALS
BODY MASS INDEX: 53.92 KG/M2 | WEIGHT: 293 LBS | OXYGEN SATURATION: 98 % | DIASTOLIC BLOOD PRESSURE: 72 MMHG | HEART RATE: 57 BPM | SYSTOLIC BLOOD PRESSURE: 128 MMHG | RESPIRATION RATE: 18 BRPM | HEIGHT: 62 IN | TEMPERATURE: 98.5 F

## 2024-04-02 DIAGNOSIS — H10.9 CONJUNCTIVITIS OF RIGHT EYE, UNSPECIFIED CONJUNCTIVITIS TYPE: Primary | ICD-10-CM

## 2024-04-02 PROCEDURE — 99213 OFFICE O/P EST LOW 20 MIN: CPT | Performed by: FAMILY MEDICINE

## 2024-04-02 RX ORDER — OFLOXACIN 3 MG/ML
1 SOLUTION/ DROPS OPHTHALMIC 4 TIMES DAILY
Qty: 5 ML | Refills: 0 | Status: SHIPPED | OUTPATIENT
Start: 2024-04-02

## 2024-04-02 NOTE — PROGRESS NOTES
[facility]        NAME: Jennifer Johnson is a 33 y.o. female  : 1990    MRN: [id number]  DATE: 2024  TIME: 12:54 PM    Assessment and Plan   Conjunctivitis of right eye, unspecified conjunctivitis type [H10.9]  1. Conjunctivitis of right eye, unspecified conjunctivitis type  ofloxacin (OCUFLOX) 0.3 % ophthalmic solution            Patient Instructions       Follow up with PCP in 3-5 days.  Proceed to  ER if symptoms worsen.    If tests have been performed at [facility], our office will contact you with results if changes need to be made to the care plan discussed with you at the visit.  You can review your full results on [facility].    Chief Complaint     Chief Complaint   Patient presents with    Eye Problem     Pt presents with right eye redness, irritaiton, swelling, and purulent discharge since waking up this morning.  Pt states daughter is recovering from pink eye.  She states hx of sty in both eyes.          History of Present Illness       33-year-old female presenting for evaluation of right eye irritation and pain.  She reports waking up this morning with watery discharge, redness and swelling around her right eye.  Denies any blurry vision or visual changes.  Denies any fevers or chills.    Eye Problem   Associated symptoms include an eye discharge, eye redness and itching.       Review of Systems   Review of Systems   Constitutional: Negative.    HENT: Negative.     Eyes:  Positive for pain, discharge, redness and itching.   Respiratory: Negative.     Cardiovascular: Negative.    Gastrointestinal: Negative.    Genitourinary: Negative.    Musculoskeletal: Negative.    Skin: Negative.    Allergic/Immunologic: Negative.    Neurological: Negative.    Hematological: Negative.    Psychiatric/Behavioral: Negative.           Current Medications       Current Outpatient Medications:     albuterol (PROVENTIL HFA,VENTOLIN HFA) 90 mcg/act inhaler, Inhale 1 puff every 6 (six) hours as  "needed, Disp: , Rfl:     atorvastatin (LIPITOR) 10 mg tablet, TAKE 1 TABLET BY MOUTH EVERY DAY, Disp: 90 tablet, Rfl: 1    cyclobenzaprine (FLEXERIL) 10 mg tablet, TAKE 1 TABLET BY MOUTH TWICE A DAY AS NEEDED FOR MUSCLE SPASM, Disp: 30 tablet, Rfl: 0    lisinopril (ZESTRIL) 40 mg tablet, TAKE 1 TABLET BY MOUTH EVERY DAY, Disp: 90 tablet, Rfl: 1    ofloxacin (OCUFLOX) 0.3 % ophthalmic solution, Administer 1 drop to the right eye 4 (four) times a day, Disp: 5 mL, Rfl: 0    OLANZapine (ZyPREXA) 2.5 mg tablet, Take 2.5 mg by mouth daily at bedtime, Disp: , Rfl:     ondansetron (ZOFRAN) 4 mg tablet, Take 1 tablet (4 mg total) by mouth every 8 (eight) hours as needed for nausea or vomiting, Disp: 20 tablet, Rfl: 0    Pancrelipase, Lip-Prot-Amyl, (Creon) 41189-267970 units CPEP, Take 1 capsule (36,000 Units total) by mouth 3 (three) times a day with meals, Disp: 90 capsule, Rfl: 1    propranolol (INDERAL) 40 mg tablet, Take 40 mg by mouth 2 (two) times a day, Disp: , Rfl:     Current Allergies     Allergies as of 2024    (No Known Allergies)            The following portions of the patient's history were reviewed and updated as appropriate: allergies, current medications, past family history, past medical history, past social history, past surgical history and problem list.     Past Medical History:   Diagnosis Date    Asthma     Hypertension     Migraine        Past Surgical History:   Procedure Laterality Date     SECTION  2018    CHOLECYSTECTOMY  2012    KNEE SURGERY         Family History   Problem Relation Age of Onset    Hypertension Mother     Asthma Mother     ADD / ADHD Father     Breast cancer Sister     Colon cancer Neg Hx     Colon polyps Neg Hx          Medications have been verified.        Objective   /72   Pulse 57   Temp 98.5 °F (36.9 °C)   Resp 18   Ht 5' 2\" (1.575 m)   Wt 136 kg (300 lb)   LMP 2024 (Exact Date)   SpO2 98%   BMI 54.87 kg/m²   Patient's last menstrual " period was 02/14/2024 (exact date).       Physical Exam     Physical Exam  Vitals and nursing note reviewed.   Constitutional:       Appearance: She is well-developed.   HENT:      Head: Normocephalic.      Nose: Nose normal.   Eyes:      General:         Right eye: Discharge present.         Left eye: No discharge.      Pupils: Pupils are equal, round, and reactive to light.   Cardiovascular:      Rate and Rhythm: Normal rate.   Pulmonary:      Effort: Pulmonary effort is normal.   Abdominal:      General: Abdomen is flat.   Musculoskeletal:         General: Normal range of motion.      Cervical back: Normal range of motion.   Skin:     General: Skin is warm and dry.   Neurological:      Mental Status: She is alert and oriented to person, place, and time.

## 2024-04-13 ENCOUNTER — APPOINTMENT (OUTPATIENT)
Dept: RADIOLOGY | Facility: CLINIC | Age: 34
End: 2024-04-13
Payer: COMMERCIAL

## 2024-04-13 ENCOUNTER — OFFICE VISIT (OUTPATIENT)
Dept: URGENT CARE | Facility: CLINIC | Age: 34
End: 2024-04-13
Payer: COMMERCIAL

## 2024-04-13 VITALS
TEMPERATURE: 98 F | SYSTOLIC BLOOD PRESSURE: 167 MMHG | DIASTOLIC BLOOD PRESSURE: 79 MMHG | HEART RATE: 63 BPM | OXYGEN SATURATION: 98 % | BODY MASS INDEX: 54.1 KG/M2 | RESPIRATION RATE: 18 BRPM | WEIGHT: 293 LBS

## 2024-04-13 DIAGNOSIS — S89.92XA LEFT KNEE INJURY, INITIAL ENCOUNTER: Primary | ICD-10-CM

## 2024-04-13 PROCEDURE — 73564 X-RAY EXAM KNEE 4 OR MORE: CPT

## 2024-04-13 PROCEDURE — 99213 OFFICE O/P EST LOW 20 MIN: CPT

## 2024-04-13 NOTE — PROGRESS NOTES
[facility]        NAME: Jennifer Johnson is a 33 y.o. female  : 1990    MRN: [id number]  DATE: 2024  TIME: 6:45 PM    Assessment and Plan   Left knee injury, initial encounter [S89.92XA]  1. Left knee injury, initial encounter  Ambulatory Referral to Orthopedic Surgery    XR knee 4+ vw left injury        Orthopedic follow-up  Given crutches    Patient Instructions   The final xray result will appear in your mychart; use the crutches until you get the xray result, if the final xray shows a fracture, keep using the crutches until you follow-up with orthopedics, if the xray shows no fracture, you can use the crutches as needed; take off every 1-2 hours and can take it off to sleep and shower if there is no fracture.   Ice as needed.  Acetaminophen and/or ibuprofen for pain and inflammation.  Follow-up with orthopedics.  PCP follow-up in 3-5 days  Proceed to the ER if symptoms worsen.    If tests have been performed at [facility], our office will contact you with results if changes need to be made to the care plan discussed with you at the visit.  You can review your full results on [facility].    Chief Complaint     Chief Complaint   Patient presents with    Left Knee Pain     Pt tripped over a pot hole and fell. Pt landed on left knee. Pt reports pain in left knee since fall, especially when trying to bear weight on left knee.          History of Present Illness       33-year-old female presents for left knee injury sustained yesterday.  Patient admits she was walking down the road tripped and her knee fell.  Unsure of exact mechanism of injury.  Patient denies hearing any audible cracking or popping sounds.  Knee does feel swollen.  Does have 1 prior knee injury from sports years ago.  Unsure of the diagnosis.  Patient admits that she was told because of injury she would have complications with healing if she ever had future knee injuries.  Has taken Aleve and used ice without relief.   Patient admits ice makes the pain worse.  Does have radiation up and down the leg.  Denies any numbness or tingling.        Review of Systems   Review of Systems   Musculoskeletal:  Positive for gait problem and joint swelling.   Skin:  Positive for wound.         Current Medications       Current Outpatient Medications:     albuterol (PROVENTIL HFA,VENTOLIN HFA) 90 mcg/act inhaler, Inhale 1 puff every 6 (six) hours as needed, Disp: , Rfl:     atorvastatin (LIPITOR) 10 mg tablet, TAKE 1 TABLET BY MOUTH EVERY DAY, Disp: 90 tablet, Rfl: 1    cyclobenzaprine (FLEXERIL) 10 mg tablet, TAKE 1 TABLET BY MOUTH TWICE A DAY AS NEEDED FOR MUSCLE SPASM, Disp: 30 tablet, Rfl: 0    lisinopril (ZESTRIL) 40 mg tablet, TAKE 1 TABLET BY MOUTH EVERY DAY, Disp: 90 tablet, Rfl: 1    ofloxacin (OCUFLOX) 0.3 % ophthalmic solution, Administer 1 drop to the right eye 4 (four) times a day, Disp: 5 mL, Rfl: 0    OLANZapine (ZyPREXA) 2.5 mg tablet, Take 2.5 mg by mouth daily at bedtime, Disp: , Rfl:     ondansetron (ZOFRAN) 4 mg tablet, Take 1 tablet (4 mg total) by mouth every 8 (eight) hours as needed for nausea or vomiting, Disp: 20 tablet, Rfl: 0    Pancrelipase, Lip-Prot-Amyl, (Creon) 87341-250782 units CPEP, Take 1 capsule (36,000 Units total) by mouth 3 (three) times a day with meals, Disp: 90 capsule, Rfl: 1    propranolol (INDERAL) 40 mg tablet, Take 40 mg by mouth 2 (two) times a day, Disp: , Rfl:     Current Allergies     Allergies as of 2024    (No Known Allergies)            The following portions of the patient's history were reviewed and updated as appropriate: allergies, current medications, past family history, past medical history, past social history, past surgical history and problem list.     Past Medical History:   Diagnosis Date    Asthma     Hypertension     Migraine        Past Surgical History:   Procedure Laterality Date     SECTION  2018    CHOLECYSTECTOMY  2012    KNEE SURGERY         Family  History   Problem Relation Age of Onset    Hypertension Mother     Asthma Mother     ADD / ADHD Father     Breast cancer Sister     Colon cancer Neg Hx     Colon polyps Neg Hx          Medications have been verified.        Objective   /79   Pulse 63   Temp 98 °F (36.7 °C) (Temporal)   Resp 18   Wt 134 kg (295 lb 12.8 oz)   LMP 02/14/2024 (Exact Date) Comment: Pt reports menstural cycle is regularly irregular.  SpO2 98%   BMI 54.10 kg/m²   Patient's last menstrual period was 02/14/2024 (exact date).       Physical Exam     Physical Exam  Vitals and nursing note reviewed.   Constitutional:       General: She is not in acute distress.     Appearance: She is not toxic-appearing.   HENT:      Head: Normocephalic and atraumatic.   Eyes:      Conjunctiva/sclera: Conjunctivae normal.   Pulmonary:      Effort: Pulmonary effort is normal.   Musculoskeletal:      Comments: Posterior tibialis pulse 2+  Diffuse tenderness to palpation of the knee  Range of motion decreased in all directions secondary to pain  Gait abnormal   Skin:     Comments: Superficial abrasion on the knee   Neurological:      Mental Status: She is alert.   Psychiatric:         Mood and Affect: Mood normal.         Behavior: Behavior normal.

## 2024-04-13 NOTE — PATIENT INSTRUCTIONS
Patient Instructions   The final xray result will appear in your mychart; use the crutches until you get the xray result, if the final xray shows a fracture, keep using the crutches until you follow-up with orthopedics, if the xray shows no fracture, you can use the crutches as needed; take off every 1-2 hours and can take it off to sleep and shower if there is no fracture.   Ice as needed.  Acetaminophen and/or ibuprofen for pain and inflammation.  Follow-up with orthopedics.  PCP follow-up in 3-5 days  Proceed to the ER if symptoms worsen.    If [facility] performed at [facility], our office will contact you with results if changes need to be made to the care plan discussed with you at the visit.  You can review your full results on [facility].

## 2024-04-23 NOTE — PROGRESS NOTES
[facility] [place]  [address] [place] [address]    Assessment/Plan:  {There are no diagnoses linked to this encounter. (Refresh or delete this SmartLink)}    Subjective:   Jennifer Johnson is a 33 y.o. No obstetric history on file. female.  CC: ***      HPI:   HPI    Gyn History  Patient's last menstrual period was 2024 (exact date).       Last pap smear: Not on file    She  reports that she is not currently sexually active and has had partner(s) who are male.       OB History  No obstetric history on file.    Past Medical History:  No date: Asthma  No date: Hypertension  No date: Migraine     Past Surgical History:  :  SECTION  : CHOLECYSTECTOMY  : KNEE SURGERY     Social History     Tobacco Use    Smoking status: Every Day     Current packs/day: 1.00     Average packs/day: 1 pack/day for 20.0 years (20.0 ttl pk-yrs)     Types: Cigarettes    Smokeless tobacco: Never   Vaping Use    Vaping status: Every Day    Substances: Nicotine   Substance Use Topics    Alcohol use: No    Drug use: Yes     Types: Marijuana          Current Outpatient Medications:     albuterol (PROVENTIL HFA,VENTOLIN HFA) 90 mcg/act inhaler, Inhale 1 puff every 6 (six) hours as needed, Disp: , Rfl:     atorvastatin (LIPITOR) 10 mg tablet, TAKE 1 TABLET BY MOUTH EVERY DAY, Disp: 90 tablet, Rfl: 1    cyclobenzaprine (FLEXERIL) 10 mg tablet, TAKE 1 TABLET BY MOUTH TWICE A DAY AS NEEDED FOR MUSCLE SPASM, Disp: 30 tablet, Rfl: 0    lisinopril (ZESTRIL) 40 mg tablet, TAKE 1 TABLET BY MOUTH EVERY DAY, Disp: 90 tablet, Rfl: 1    ofloxacin (OCUFLOX) 0.3 % ophthalmic solution, Administer 1 drop to the right eye 4 (four) times a day, Disp: 5 mL, Rfl: 0    OLANZapine (ZyPREXA) 2.5 mg tablet, Take 2.5 mg by mouth daily at bedtime, Disp: , Rfl:     ondansetron (ZOFRAN) 4 mg tablet, Take 1 tablet (4 mg total) by mouth every 8 (eight) hours as needed for nausea or vomiting, Disp: 20 tablet, Rfl: 0    Pancrelipase,  Lip-Prot-Amyl, (Creon) 62552-149183 units CPEP, Take 1 capsule (36,000 Units total) by mouth 3 (three) times a day with meals, Disp: 90 capsule, Rfl: 1    propranolol (INDERAL) 40 mg tablet, Take 40 mg by mouth 2 (two) times a day, Disp: , Rfl:     She has No Known Allergies..    ROS: Review of Systems    Objective:  LMP 02/14/2024 (Exact Date) Comment: Pt reports menstural cycle is regularly irregular.     Physical Exam

## 2024-04-24 ENCOUNTER — OFFICE VISIT (OUTPATIENT)
Dept: OBGYN CLINIC | Facility: CLINIC | Age: 34
End: 2024-04-24
Payer: COMMERCIAL

## 2024-04-24 VITALS
HEIGHT: 63 IN | WEIGHT: 293 LBS | DIASTOLIC BLOOD PRESSURE: 80 MMHG | SYSTOLIC BLOOD PRESSURE: 122 MMHG | BODY MASS INDEX: 51.91 KG/M2

## 2024-04-24 DIAGNOSIS — R10.2 PELVIC PAIN: Primary | ICD-10-CM

## 2024-04-24 DIAGNOSIS — Z01.419 ROUTINE GYNECOLOGICAL EXAMINATION: ICD-10-CM

## 2024-04-24 LAB
CFTR FULL MUT ANL BLD/T SEQ: NORMAL
CITATION REF LAB TEST: NORMAL
CLINICAL INFO: NORMAL
ETHNIC BACKGROUND STATED: NORMAL
GENE DIS ANL CARRIER INTERP-IMP: NORMAL
INDICATION: NORMAL
LAB DIRECTOR NAME PROVIDER: NORMAL
RECOMMENDATION PATIENT DOC-IMP: NORMAL
REF LAB TEST METHOD: NORMAL
SERVICE CMNT-IMP: NORMAL
SPECIMEN SOURCE: NORMAL

## 2024-04-24 PROCEDURE — 99385 PREV VISIT NEW AGE 18-39: CPT | Performed by: NURSE PRACTITIONER

## 2024-04-24 NOTE — PROGRESS NOTES
[facility] - [place]  [address] [place] [address]    ASSESSMENT/PLAN: Jennifer Johnson is a 33 y.o. No obstetric history on file. who presents for annual gynecologic exam.    Encounter for routine gynecologic examination  - Routine well woman exam completed today.  - Cervical Cancer Screening: Current ASCCP Guidelines reviewed. Last Pap: Not on file . History of abnormal: Denies  - HPV Vaccination status: Immunization series complete  - STI screening offered including HIV testing: GC/CT done, others declined  - Contraceptive counseling discussed.  Current contraception: abstinence:   - The following were reviewed in today's visit: breast self exam, STD testing, exercise, healthy diet, and tobacco cessation    Additional problems addressed during this visit:  1. Pelvic pain  -     US pelvis complete non OB; Future    2. Routine gynecological examination  -     IGP,CtNg,AptimaHPV,rfx16/18,45    Discussed some possible etiologies of pelvic pain  Will evaluate with ultrasound  Discussed unlikely that ventral hernia is related to her pelvic pain. Also discussed symptoms of strangulation, which requires evaluation in the ER.   I will touch base with the patient when her ultrasound result is received.     CC:  Annual Gynecologic Examination    HPI: Jennifer Johnson is a 33 y.o. No obstetric history on file. who presents for annual gynecologic examination. She is complaining of pelvic pain that has been going on for the last 5 years. She states that she has very painful periods, but has pain in between them as well. She had regular monthly menses until having her daughter 5 years ago.  She currently has a menses irregularly, sometimes skips 1-2 months. She is not concerned with the volume of her menses, just concerned with pain. She reports a history of breast cancer in 2019, stating that she was treated with 'a pill' and does not see a breast specialist for this, managed by her PCP and in the clear curretly.   This history is undocumented in her chart. She sees her PCP regularly, who manages many of her medical issues, as well as specialists for Neurology and Orthopedics recently for knee pain. She was recently diagnosed with a ventral hernia, reports dissatisfaction with the surgeon she saw regarding this, who did not recommend elective removal at this time. She and her partner are wondering if the hernia could be responsible or related to the pelvic pain. She is here today with her partner.      ROS: Negative except as noted in HPI    Patient's last menstrual period was 04/15/2024 (exact date).       She  reports that she is not currently sexually active and has had partner(s) who are male.       The following portions of the patient's history were reviewed and updated as appropriate:   Past Medical History:   Diagnosis Date    Asthma     Hypertension     Migraine     Miscarriage     Ventral hernia      Past Surgical History:   Procedure Laterality Date     SECTION  2018    CHOLECYSTECTOMY  2012    KNEE SURGERY       Family History   Problem Relation Age of Onset    Hypertension Mother     Asthma Mother     Migraines Mother     Seizures Mother     ADD / ADHD Father     Breast cancer Sister     Colon cancer Neg Hx     Colon polyps Neg Hx      Social History     Socioeconomic History    Marital status: Single     Spouse name: None    Number of children: None    Years of education: None    Highest education level: None   Occupational History    None   Tobacco Use    Smoking status: Every Day     Current packs/day: 1.00     Average packs/day: 1 pack/day for 20.0 years (20.0 ttl pk-yrs)     Types: Cigarettes    Smokeless tobacco: Never   Vaping Use    Vaping status: Every Day    Substances: Nicotine   Substance and Sexual Activity    Alcohol use: No    Drug use: Yes     Types: Marijuana    Sexual activity: Not Currently     Partners: Male   Other Topics Concern    None   Social History Narrative    None  "    Social Determinants of Health     Financial Resource Strain: Not on file   Food Insecurity: Not on file   Transportation Needs: Not on file   Physical Activity: Not on file   Stress: Not on file   Social Connections: Not on file   Intimate Partner Violence: Not on file   Housing Stability: Not on file     Outpatient Medications Marked as Taking for the 4/24/24 encounter (Office Visit) with DIXIE Barrientos   Medication    albuterol (PROVENTIL HFA,VENTOLIN HFA) 90 mcg/act inhaler    atorvastatin (LIPITOR) 10 mg tablet    cyclobenzaprine (FLEXERIL) 10 mg tablet    lisinopril (ZESTRIL) 40 mg tablet    ondansetron (ZOFRAN) 4 mg tablet    Pancrelipase, Lip-Prot-Amyl, (Creon) 17846-863475 units CPEP    propranolol (INDERAL) 40 mg tablet     No Known Allergies        Objective:  /80   Ht 5' 2.5\" (1.588 m)   Wt 134 kg (296 lb)   LMP 04/15/2024 (Exact Date) Comment: Pt reports menstural cycle is regularly irregular., may skip months  BMI 53.28 kg/m²        Chaperone present? Yes, pts partner present.    General Appearance: alert and oriented, in no acute distress.   Neck/Thyroid: No thyromegaly, no thyroid nodules.  Respiratory: Unlabored breathing.  Cardiovascular: No peripheral edema.  Abdomen: Soft, non-tender, non-distended, no masses, no rebound or guarding.  Breast Exam: No dimpling, nipple retraction or discharge. No lumps or masses. No axillary or supraclavicular nodes.   Pelvic:       External genitalia: Normal appearance, no abnormal pigmentation, no lesions or masses. Normal Bartholin's and [place]'s.      Urinary system: Urethral meatus normal, bladder non-tender.      Vaginal: normal mucosa without prolapse or lesions. Normal-appearing physiologic discharge.      Cervix: Very anterior cervix, Normal-appearing, no gross lesions or masses. No cervical motion tenderness. Very difficult exam secondary to pt habitus.      Adnexa: No adnexal masses or tenderness noted.      Uterus: Very difficult, " limited exam secondary to pt habitus. No tenderness noted.   Extremities: Normal range of motion. Warm, well-perfused, non-tender.   Skin: normal, no rash or abnormalities  Neurologic: alert, oriented x3  Psychiatric: Appropriate affect, mood stable, cooperative with exam.        DIXIE Barrientos  4/24/2024 10:28 AM

## 2024-04-29 DIAGNOSIS — B96.89 BACTERIAL VAGINOSIS: Primary | ICD-10-CM

## 2024-04-29 DIAGNOSIS — N76.0 BACTERIAL VAGINOSIS: Primary | ICD-10-CM

## 2024-04-29 LAB
C TRACH RRNA CVX QL NAA+PROBE: NEGATIVE
CYTOLOGIST CVX/VAG CYTO: NORMAL
DX ICD CODE: NORMAL
HPV GENOTYPE REFLEX: NORMAL
HPV I/H RISK 4 DNA CVX QL PROBE+SIG AMP: NEGATIVE
N GONORRHOEA RRNA CVX QL NAA+PROBE: NEGATIVE
OTHER STN SPEC: NORMAL
PATH REPORT.FINAL DX SPEC: NORMAL
SL AMB NOTE:: NORMAL
SL AMB SPECIMEN ADEQUACY: NORMAL
SL AMB TEST METHODOLOGY: NORMAL

## 2024-04-29 RX ORDER — METRONIDAZOLE 7.5 MG/G
1 GEL VAGINAL
Qty: 70 G | Refills: 0 | Status: SHIPPED | OUTPATIENT
Start: 2024-04-29

## 2024-05-01 ENCOUNTER — OFFICE VISIT (OUTPATIENT)
Dept: FAMILY MEDICINE CLINIC | Facility: CLINIC | Age: 34
End: 2024-05-01
Payer: COMMERCIAL

## 2024-05-01 VITALS
HEART RATE: 58 BPM | DIASTOLIC BLOOD PRESSURE: 80 MMHG | BODY MASS INDEX: 50.4 KG/M2 | TEMPERATURE: 98 F | SYSTOLIC BLOOD PRESSURE: 127 MMHG | WEIGHT: 280 LBS | OXYGEN SATURATION: 99 %

## 2024-05-01 DIAGNOSIS — G89.29 CHRONIC BILATERAL LOW BACK PAIN WITHOUT SCIATICA: Primary | ICD-10-CM

## 2024-05-01 DIAGNOSIS — M54.50 CHRONIC BILATERAL LOW BACK PAIN WITHOUT SCIATICA: Primary | ICD-10-CM

## 2024-05-01 PROCEDURE — 3725F SCREEN DEPRESSION PERFORMED: CPT | Performed by: NURSE PRACTITIONER

## 2024-05-01 PROCEDURE — 99213 OFFICE O/P EST LOW 20 MIN: CPT | Performed by: NURSE PRACTITIONER

## 2024-05-01 RX ORDER — PREDNISONE 20 MG/1
20 TABLET ORAL DAILY
Qty: 15 TABLET | Refills: 0 | Status: SHIPPED | OUTPATIENT
Start: 2024-05-01

## 2024-05-01 NOTE — PROGRESS NOTES
[facility]        NAME: Jennifer Johnson is a 33 y.o. female  : 1990    MRN: [id number]  DATE: May 1, 2024  TIME: 1:01 PM    Assessment and Plan   Chronic bilateral low back pain without sciatica [M54.50, G89.29]  1. Chronic bilateral low back pain without sciatica  predniSONE 20 mg tablet            Patient Instructions     Patient Instructions   Prednisone as ordered for pain/inflammation  Continue Flexeril as ordered  F/up with rheumatology as scheduled        Chief Complaint   No chief complaint on file.        History of Present Illness       Patient has a hx of chronic low back pain that radiates down both legs. Described the pain as shooting. She was seen at pain management that referred her to rheumatology but her appointment is not until 5/15/23. She states her pain is 10/10. She is taking flexeril and ibuprofen with little relief. No loss bladder/bowel.        Review of Systems   Review of Systems   Constitutional:  Positive for activity change. Negative for fever.   Respiratory:  Negative for shortness of breath.    Cardiovascular:  Negative for chest pain.   Musculoskeletal:  Positive for back pain.   Neurological:  Positive for weakness (low back).   Psychiatric/Behavioral:  Negative for dysphoric mood.          Current Medications       Current Outpatient Medications:     albuterol (PROVENTIL HFA,VENTOLIN HFA) 90 mcg/act inhaler, Inhale 1 puff every 6 (six) hours as needed, Disp: , Rfl:     atorvastatin (LIPITOR) 10 mg tablet, TAKE 1 TABLET BY MOUTH EVERY DAY, Disp: 90 tablet, Rfl: 1    cyclobenzaprine (FLEXERIL) 10 mg tablet, TAKE 1 TABLET BY MOUTH TWICE A DAY AS NEEDED FOR MUSCLE SPASM, Disp: 30 tablet, Rfl: 0    lisinopril (ZESTRIL) 40 mg tablet, TAKE 1 TABLET BY MOUTH EVERY DAY, Disp: 90 tablet, Rfl: 1    metroNIDAZOLE (METROGEL) 0.75 % vaginal gel, Insert 1 Application into the vagina daily at bedtime 1 applicator full (approx 5 grams) vaginally x 5 days., Disp: 70 g,  Rfl: 0    ondansetron (ZOFRAN) 4 mg tablet, Take 1 tablet (4 mg total) by mouth every 8 (eight) hours as needed for nausea or vomiting, Disp: 20 tablet, Rfl: 0    Pancrelipase, Lip-Prot-Amyl, (Creon) 49058-174599 units CPEP, Take 1 capsule (36,000 Units total) by mouth 3 (three) times a day with meals, Disp: 90 capsule, Rfl: 1    predniSONE 20 mg tablet, Take 1 tablet (20 mg total) by mouth daily, Disp: 15 tablet, Rfl: 0    propranolol (INDERAL) 40 mg tablet, Take 40 mg by mouth 2 (two) times a day, Disp: , Rfl:     Current Allergies     Allergies as of 2024    (No Known Allergies)            The following portions of the patient's history were reviewed and updated as appropriate: allergies, current medications, past family history, past medical history, past social history, past surgical history and problem list.     Past Medical History:   Diagnosis Date    Asthma     Hypertension     Migraine     Miscarriage     Ventral hernia        Past Surgical History:   Procedure Laterality Date     SECTION      CHOLECYSTECTOMY      KNEE SURGERY         Family History   Problem Relation Age of Onset    Hypertension Mother     Asthma Mother     Migraines Mother     Seizures Mother     ADD / ADHD Father     Breast cancer Sister     Colon cancer Neg Hx     Colon polyps Neg Hx          Medications have been verified.        Objective   /80 (BP Location: Left arm, Patient Position: Sitting, Cuff Size: Standard)   Pulse 58   Temp 98 °F (36.7 °C) (Tympanic)   Wt 127 kg (280 lb)   LMP 04/15/2024 (Exact Date) Comment: Pt reports menstural cycle is regularly irregular., may skip months  SpO2 99%   BMI 50.40 kg/m²        Physical Exam     Physical Exam  Vitals and nursing note reviewed.   Constitutional:       General: She is not in acute distress.     Appearance: Normal appearance. She is obese. She is not ill-appearing.   HENT:      Head: Normocephalic.   Eyes:      Extraocular Movements:  Extraocular movements intact.   Cardiovascular:      Rate and Rhythm: Normal rate and regular rhythm.      Heart sounds: Normal heart sounds.   Pulmonary:      Effort: Pulmonary effort is normal. No respiratory distress.      Breath sounds: Normal breath sounds.   Musculoskeletal:      Cervical back: Normal.      Lumbar back: Tenderness present. Decreased range of motion.   Skin:     General: Skin is warm and dry.   Neurological:      Mental Status: She is alert and oriented to person, place, and time.   Psychiatric:         Mood and Affect: Mood normal.         Behavior: Behavior normal.

## 2024-05-01 NOTE — PATIENT INSTRUCTIONS
Prednisone as ordered for pain/inflammation  Continue Flexeril as ordered  F/up with rheumatology as scheduled

## 2024-05-02 PROBLEM — H10.9 CONJUNCTIVITIS OF RIGHT EYE: Status: RESOLVED | Noted: 2024-04-02 | Resolved: 2024-05-02

## 2024-05-04 ENCOUNTER — HOSPITAL ENCOUNTER (OUTPATIENT)
Dept: CT IMAGING | Facility: HOSPITAL | Age: 34
Discharge: HOME | End: 2024-05-04
Attending: INTERNAL MEDICINE
Payer: COMMERCIAL

## 2024-05-04 DIAGNOSIS — K86.89 PANCREATIC INSUFFICIENCY: ICD-10-CM

## 2024-05-04 PROCEDURE — 74177 CT ABD & PELVIS W/CONTRAST: CPT

## 2024-05-04 RX ADMIN — IOHEXOL 130 ML: 350 INJECTION, SOLUTION INTRAVENOUS at 14:55

## 2024-05-09 ENCOUNTER — HOSPITAL ENCOUNTER (OUTPATIENT)
Dept: ULTRASOUND IMAGING | Facility: HOSPITAL | Age: 34
End: 2024-05-09
Payer: COMMERCIAL

## 2024-05-09 DIAGNOSIS — R10.2 PELVIC PAIN: ICD-10-CM

## 2024-05-09 PROCEDURE — 76830 TRANSVAGINAL US NON-OB: CPT

## 2024-05-09 PROCEDURE — 76856 US EXAM PELVIC COMPLETE: CPT

## 2024-05-13 DIAGNOSIS — M54.50 CHRONIC BILATERAL LOW BACK PAIN WITHOUT SCIATICA: ICD-10-CM

## 2024-05-13 DIAGNOSIS — K86.89 PANCREATIC INSUFFICIENCY: ICD-10-CM

## 2024-05-13 DIAGNOSIS — R19.7 DIARRHEA, UNSPECIFIED TYPE: ICD-10-CM

## 2024-05-13 DIAGNOSIS — G89.29 CHRONIC BILATERAL LOW BACK PAIN WITHOUT SCIATICA: ICD-10-CM

## 2024-05-13 RX ORDER — CYCLOBENZAPRINE HCL 10 MG
10 TABLET ORAL 2 TIMES DAILY PRN
Qty: 30 TABLET | Refills: 0 | Status: SHIPPED | OUTPATIENT
Start: 2024-05-13 | End: 2024-05-15 | Stop reason: ALTCHOICE

## 2024-05-13 RX ORDER — PANCRELIPASE 36000; 180000; 114000 [USP'U]/1; [USP'U]/1; [USP'U]/1
36000 CAPSULE, DELAYED RELEASE PELLETS ORAL
Qty: 90 CAPSULE | Refills: 5 | Status: SHIPPED | OUTPATIENT
Start: 2024-05-13

## 2024-05-13 NOTE — TELEPHONE ENCOUNTER
Patient called to request a refill for their Flexeril advised a refill was requested on 05/13/24 and is pending approval. Patient verbalized understanding and is in agreement.     Pt only has 2 pills left. Please send HP

## 2024-05-13 NOTE — TELEPHONE ENCOUNTER
Patient called to request a refill for their Creon advised a refill was requested on 05/13/24 and is pending approval. Patient verbalized understanding and is in agreement.     Pt has less then 3 days left.

## 2024-05-15 ENCOUNTER — OFFICE VISIT (OUTPATIENT)
Dept: RHEUMATOLOGY | Facility: CLINIC | Age: 34
End: 2024-05-15
Payer: COMMERCIAL

## 2024-05-15 ENCOUNTER — APPOINTMENT (OUTPATIENT)
Dept: LAB | Facility: CLINIC | Age: 34
End: 2024-05-15
Payer: COMMERCIAL

## 2024-05-15 VITALS
DIASTOLIC BLOOD PRESSURE: 88 MMHG | HEART RATE: 73 BPM | SYSTOLIC BLOOD PRESSURE: 126 MMHG | WEIGHT: 293 LBS | BODY MASS INDEX: 51.91 KG/M2 | OXYGEN SATURATION: 100 % | HEIGHT: 63 IN

## 2024-05-15 DIAGNOSIS — M19.90 ARTHRITIS: ICD-10-CM

## 2024-05-15 DIAGNOSIS — R79.89 LOW VITAMIN D LEVEL: ICD-10-CM

## 2024-05-15 DIAGNOSIS — M19.90 SENILE ARTHRITIS: Primary | ICD-10-CM

## 2024-05-15 DIAGNOSIS — M19.90 OSTEOARTHRITIS, UNSPECIFIED OSTEOARTHRITIS TYPE, UNSPECIFIED SITE: ICD-10-CM

## 2024-05-15 DIAGNOSIS — M79.7 FIBROMYALGIA: Primary | ICD-10-CM

## 2024-05-15 LAB
25(OH)D3 SERPL-MCNC: 20.9 NG/ML (ref 30–100)
CRP SERPL QL: 15.4 MG/L
ERYTHROCYTE [SEDIMENTATION RATE] IN BLOOD: 29 MM/HOUR (ref 0–19)

## 2024-05-15 PROCEDURE — 99244 OFF/OP CNSLTJ NEW/EST MOD 40: CPT | Performed by: INTERNAL MEDICINE

## 2024-05-15 PROCEDURE — 86140 C-REACTIVE PROTEIN: CPT

## 2024-05-15 PROCEDURE — 86430 RHEUMATOID FACTOR TEST QUAL: CPT | Performed by: INTERNAL MEDICINE

## 2024-05-15 PROCEDURE — 82306 VITAMIN D 25 HYDROXY: CPT

## 2024-05-15 PROCEDURE — 86200 CCP ANTIBODY: CPT | Performed by: INTERNAL MEDICINE

## 2024-05-15 PROCEDURE — 36415 COLL VENOUS BLD VENIPUNCTURE: CPT | Performed by: INTERNAL MEDICINE

## 2024-05-15 PROCEDURE — 85652 RBC SED RATE AUTOMATED: CPT

## 2024-05-15 RX ORDER — NAPROXEN 500 MG/1
500 TABLET ORAL 2 TIMES DAILY WITH MEALS
Qty: 60 TABLET | Refills: 6 | Status: SHIPPED | OUTPATIENT
Start: 2024-05-15 | End: 2024-05-22 | Stop reason: ALTCHOICE

## 2024-05-15 RX ORDER — NORTRIPTYLINE HYDROCHLORIDE 10 MG/1
10 CAPSULE ORAL
Qty: 30 CAPSULE | Refills: 6 | Status: SHIPPED | OUTPATIENT
Start: 2024-05-15

## 2024-05-15 RX ORDER — TIZANIDINE 2 MG/1
2 TABLET ORAL EVERY 8 HOURS PRN
Qty: 90 TABLET | Refills: 6 | Status: SHIPPED | OUTPATIENT
Start: 2024-05-15 | End: 2024-06-24

## 2024-05-15 NOTE — PATIENT INSTRUCTIONS
Start nortriptyline at bedtime  Then can add on tizanidine up to 3 times a day as needed for muscle pain  Then can add on naproxen twice a day as needed for joint pain, take with food  Water therapy referral made  Consider No[facility]/weight management to help with weight loss  Do labs    Return to clinic in 6 months

## 2024-05-15 NOTE — PROGRESS NOTES
RHEUMATOLOGY NEW PATIENT NOTE      Assessment and Plan:   Assessment & Plan  1. Hypermobility.  Upon examination, the [facility] is 5 out of 9, indicative of benign hypermobility syndrome, which makes one more susceptible to osteoarthritis and fibromyalgia symptoms. A comprehensive discussion was held with the patient regarding various types of arthritis, including osteoarthritis and non-inflammatory arthritis. The patient was educated about the long-term side effects of steroids, including weight gain, blood pressure, cholesterol, and sugar issues, and the potential dependency on steroids. The role of rheumatology in ruling out an underlying autoimmune disease or inflammatory condition was also discussed. The patient was also informed about fibromyalgia, its diagnosis of exclusion, and the various management strategies for fibromyalgia, including NSAIDs, physical therapy, and pain management. The patient was informed that land therapy could exacerbate her condition, depending on the cause of her arthritis. A referral for water therapy was provided, and the patient was advised to focus on muscle strengthening and range of motion. The patient was also advised to consider Noom to aid in weight loss, and a consultation with weight management was recommended. A sleep study was recommended. The patient will commence nortriptyline at bedtime, after a few days, she will add tizanidine up to 3 times a day as needed for muscle pain. After a few more days of that combination, she will add naproxen twice a day as needed for joint pain. Autoimmune labs will be ordered.    Follow-up  The patient is scheduled for a follow-up visit in 6 months.    Start nortriptyline at bedtime  Then can add on tizanidine up to 3 times a day as needed for muscle pain  Then can add on naproxen twice a day as needed for joint pain, take with food  Water therapy referral made  Consider Noom/weight management to help with  weight loss  Do labs    Return to clinic in 6 months    Plan:  Diagnoses and all orders for this visit:    Fibromyalgia  -     Ambulatory Referral to Rheumatology  -      Aquatic Therapy  -     nortriptyline (PAMELOR) 10 mg capsule; Take 1 capsule (10 mg total) by mouth daily at bedtime  -     tiZANidine (ZANAFLEX) 2 mg tablet; Take 1 tablet (2 mg total) by mouth every 8 (eight) hours as needed for muscle spasms    Osteoarthritis, unspecified osteoarthritis type, unspecified site  -      Aquatic Therapy  -     Discontinue: naproxen (Naprosyn) 500 mg tablet; Take 1 tablet (500 mg total) by mouth 2 (two) times a day with meals As needed for joint pain    Arthritis  -     Cancel: Sedimentation rate, automated  -     Cancel: C-reactive protein  -     Cyclic citrul peptide antibody, IgG  -     RF Screen w/ Reflex to Titer    Low vitamin D level  -     Cancel: Vitamin D 25 hydroxy        Follow-up plan: RTC in 6 months        Chief Complaint  No chief complaint on file.      SOFY Johnson is a 33 y.o.  female who presents as a Rheumatology consult referred by Yenifer Sepulveda CRNP for evaluation of possible autoimmune disease.    History of Present Illness  The patient is a 33-year-old female who is here for a new patient evaluation. She is accompanied by her fiancée.    The patient's arthritis, which initially manifested in her knees during her youth, has since progressed to her entire body. Her orthopedic specialist has recommended a consultation with a rheumatologist. The onset of joint pain at the age of 11 or 12, initially manifested in her knees. Despite not being excessively flexible in her youth, she acknowledges being double-jointed, predominantly in her fingers. Her most severe joint pain extends from her back to her toes, impeding her ability to walk. If she falls, her knees become inflamed and unstable. She has a history of a tear in her right knee, which was surgically repaired. Her joints swell, but  do not exhibit redness or heat. The intensity of her joint pain varies, with some days being more severe than later in the day. Occasionally, the pain disrupts her sleep. Upon waking, she experiences stiffness lasting 4 to 5 hours. She has been prescribed steroids 20 mg for back pain, which have provided some relief, but she is currently running out of the medication. Muscle relaxers have become ineffective. Melatonin and other sleep aids have proven ineffective. She has not tried Cymbalta, gabapentin, tizanidine, methocarbamol, or baclofen. Over-the-counter medications, including 4 ibuprofen and 2 Tylenol, have not provided relief. She has not tried naproxen or Aleve. She describes her pain as sharp, shooting, radiating from her back to her toes. Physical therapy in 2016 did not provide relief, leading her to discontinue its use. She has not tried water therapy but is open to doing so. Her weight fluctuates, and dieting has not been beneficial. She has not been to weight management due to travel issues. She does not have diabetes. She has made dietary modifications, but her weight continues to fluctuate. She has not tried Noom. She has previously tried Weight Watchers. She reports fatigue, snoring, and waking up due to cessation of breathing during sleep. She denies any rashes. She takes vitamin D daily. She reports fatigue, headaches, dry eyes, dry mouth, nausea, indigestion, joint pain, joint swelling, back pain, neck pain, muscle pain, dizziness, weakness, numbness, and heat and cold intolerance.    She has a family history of insomnia. Obesity runs in her family.       Review of Systems  See HPI    Allergies  No Known Allergies    Home Medications    Current Outpatient Medications:     atorvastatin (LIPITOR) 10 mg tablet, TAKE 1 TABLET BY MOUTH EVERY DAY, Disp: 90 tablet, Rfl: 1    lisinopril (ZESTRIL) 40 mg tablet, TAKE 1 TABLET BY MOUTH EVERY DAY, Disp: 90 tablet, Rfl: 1    nortriptyline (PAMELOR) 10 mg  capsule, Take 1 capsule (10 mg total) by mouth daily at bedtime, Disp: 30 capsule, Rfl: 6    ondansetron (ZOFRAN) 4 mg tablet, Take 1 tablet (4 mg total) by mouth every 8 (eight) hours as needed for nausea or vomiting, Disp: 20 tablet, Rfl: 0    Pancrelipase, Lip-Prot-Amyl, (Creon) 12237-695623 units CPEP, Take 1 capsule (36,000 Units total) by mouth 3 (three) times a day with meals, Disp: 90 capsule, Rfl: 5    propranolol (INDERAL) 40 mg tablet, Take 40 mg by mouth 2 (two) times a day, Disp: , Rfl:     tiZANidine (ZANAFLEX) 2 mg tablet, Take 1 tablet (2 mg total) by mouth every 8 (eight) hours as needed for muscle spasms, Disp: 90 tablet, Rfl: 6    busPIRone (BUSPAR) 10 mg tablet, Take 1 tablet (10 mg total) by mouth 2 times per day, Disp: 60 tablet, Rfl: 0    famotidine (PEPCID) 20 mg tablet, Take 1 tablet (20 mg total) by mouth 2 (two) times a day for 14 days, Disp: 28 tablet, Rfl: 0    hydrOXYzine HCL (ATARAX) 25 mg tablet, Take 1 tablet (25 mg total) by mouth every 6 (six) hours, Disp: 12 tablet, Rfl: 0    meloxicam (Mobic) 15 mg tablet, Take 1 tablet (15 mg total) by mouth daily, Disp: 30 tablet, Rfl: 6    sucralfate (CARAFATE) 1 g tablet, Take 1 tablet (1 g total) by mouth 4 (four) times a day for 5 days, Disp: 20 tablet, Rfl: 0    SUMAtriptan (IMITREX) 50 mg tablet, , Disp: , Rfl:     Varenicline Tartrate, Starter, (Chantix Starting Month ) 0.5 MG X 11 & 1 MG X 42 TBPK, 0.5 mg once daily for 3 days then 0.5mg twice daily for days 4-7 then 1 mg twice daily, Disp: 53 each, Rfl: 0    Past Medical History  Past Medical History:   Diagnosis Date    Asthma     Hypertension     Migraine     Miscarriage     Ventral hernia        Past Surgical History   Past Surgical History:   Procedure Laterality Date     SECTION      CHOLECYSTECTOMY      KNEE SURGERY         Family History    Family History   Problem Relation Age of Onset    Hypertension Mother     Asthma Mother     Migraines Mother      "Seizures Mother     ADD / ADHD Father     Breast cancer Sister     Colon cancer Neg Hx     Colon polyps Neg Hx        Social History  Social History     Substance and Sexual Activity   Alcohol Use No     Social History     Substance and Sexual Activity   Drug Use Not Currently    Types: Marijuana     Social History     Tobacco Use   Smoking Status Every Day    Current packs/day: 1.00    Average packs/day: 1 pack/day for 20.0 years (20.0 ttl pk-yrs)    Types: Cigarettes   Smokeless Tobacco Never   Tobacco Comments    Pt vapes on occasion, pt smoked hookah in the past        Objective:  Vitals:    05/15/24 0816   BP: 126/88   Pulse: 73   SpO2: 100%   Weight: 134 kg (296 lb)   Height: 5' 2.5\" (1.588 m)       Physical Exam  Constitutional:       General: She is not in acute distress.  HENT:      Head: Normocephalic and atraumatic.   Eyes:      Conjunctiva/sclera: Conjunctivae normal.   Cardiovascular:      Rate and Rhythm: Normal rate and regular rhythm.      Heart sounds: S1 normal and S2 normal.      No friction rub.   Pulmonary:      Effort: Pulmonary effort is normal. No respiratory distress.      Breath sounds: Normal breath sounds. No wheezing, rhonchi or rales.   Musculoskeletal:         General: Tenderness present.      Cervical back: Neck supple.      Comments: Beighton score of 5/9, hypermobile joints   Skin:     General: Skin is warm and dry.      Coloration: Skin is not pale.      Findings: No rash.   Neurological:      Mental Status: She is alert. Mental status is at baseline.   Psychiatric:         Mood and Affect: Mood normal.         Behavior: Behavior normal.       Physical Exam      Reviewed labs and imaging.    Imaging:     CT abdomen pelvis w contrast    Result Date: 5/14/2024  Narrative: CT ABDOMEN AND PELVIS WITH IV CONTRAST INDICATION:   Other specified diseases of pancreas.  COMPARISON: 3/23/2024 TECHNIQUE:  CT examination of the abdomen and pelvis was performed. Multiplanar 2D reformatted images " were created from the source data. This examination, like all CT scans performed in the [facility], was performed utilizing techniques to minimize radiation dose exposure, including the use of iterative reconstruction and automated exposure control. Radiation dose length product (DLP) for this visit: IV Contrast:   - iohexol (OMNIPAQUE) 350 MG/ML injection (MULTI-DOSE) 130 mL Enteric Contrast:  Enteric contrast was not administered. FINDINGS: ABDOMEN LOWER CHEST: No clinically significant abnormality in the visualized lower chest. LIVER/BILIARY TREE: Enlarged liver. Diffuse hepatic steatosis. Slight nodular contour of the liver. No suspicious mass.  No biliary dilation. GALLBLADDER: Post cholecystectomy. SPLEEN: Splenomegaly measuring 13.5 cm. PANCREAS: Unremarkable. ADRENAL GLANDS: Unremarkable. KIDNEYS/URETERS: 11 mm nonobstructing right renal stone. 6 mm nonobstructing left renal stone. No hydronephrosis. STOMACH AND BOWEL: Unremarkable. APPENDIX: No findings to suggest appendicitis. ABDOMINOPELVIC CAVITY: No ascites. No pneumoperitoneum. No lymphadenopathy. VESSELS: Unremarkable for patient's age. PELVIS REPRODUCTIVE ORGANS: Unremarkable for patient's age. URINARY BLADDER: Unremarkable. ABDOMINAL WALL/INGUINAL REGIONS: Moderate size fat-containing umbilical hernia. BONES: No acute fracture or suspicious osseous lesion.     Impression: 1. No acute intra-abdominal disease. Normal enhancing pancreas without ductal dilation or parenchymal atrophy. 2. Redemonstration of hepatomegaly with diffuse steatosis. Mild lobulated contour of the liver is noted suggestive of evolving hepatic fibrosis typically seen in nonalcoholic steatohepatitis. Splenomegaly. 3. Bilateral nonobstructing renal stones. No hydronephrosis. No ureteral stones. No suspicious renal or ureteral lesions. No pelvicalyceal filling defects. 4. Moderate size fat-containing umbilical hernia is again noted. Electronically signed: 05/14/2024  06:50 AM Valentin Blanton MD       Labs:   Appointment on 05/15/2024   Component Date Value Ref Range Status    CRP 05/15/2024 15.4 (H)  <3.0 mg/L Final    Sed Rate 05/15/2024 29 (H)  0 - 19 mm/hour Final    Vit D, 25-Hydroxy 05/15/2024 20.9 (L)  30.0 - 100.0 ng/mL Final    Vitamin D guidelines established by Clinical Guidelines Subcommittee  of the Endocrine Society Task Force, 2011    Deficiency <20ng/ml   Insufficiency 20-30ng/ml   Sufficient  ng/ml    Office Visit on 05/15/2024   Component Date Value Ref Range Status    Cyclic Citrullinated Peptide Ab  05/15/2024 0.8  See comment Final    Rheumatoid Factor 05/15/2024 Negative  Negative Final   Office Visit on 04/24/2024   Component Date Value Ref Range Status    Diagnosis: 04/24/2024 Comment   Final    Comment: NEGATIVE FOR INTRAEPITHELIAL LESION OR MALIGNANCY.  PREDOMINANCE OF COCCOBACILLI CONSISTENT WITH SHIFT IN VAGINAL SAVAGE IS  PRESENT.      Specimen Adequacy 04/24/2024 Comment   Final    Satisfactory for evaluation.  No endocervical component is identified.    Clinician Provided ICD10 04/24/2024 Comment   Final    Z01.419    Performed by: 04/24/2024 Comment   Final    Zachary Leary, Cytotechnologist (ASCP)    SL AMB . 04/24/2024 .   Final    Note: 04/24/2024 Comment   Final    Comment: The Pap smear is a screening test designed to aid in the detection of  premalignant and malignant conditions of the uterine cervix.  It is not a  diagnostic procedure and should not be used as the sole means of detecting  cervical cancer.  Both false-positive and false-negative reports do occur.      Test Methodology: 04/24/2024 Comment   Final    Comment: This liquid based ThinPrep(R) pap test was screened with the  use of an image guided system.      HPV Aptima 04/24/2024 Negative  Negative Final    Comment: This nucleic acid amplification test detects fourteen high-risk  HPV types (16,18,31,33,35,39,45,51,52,56,58,59,66,68) without  differentiation.      HPV GENOTYPE  REFLEX 04/24/2024 Comment   Final    Criteria not met, HPV Genotype not performed.    Chlamydia, Nuc. Acid Amp 04/24/2024 Negative  Negative Final    Gonococcus bY Nucleic Acid Amp 04/24/2024 Negative  Negative Final   Admission on 03/24/2024, Discharged on 03/24/2024   Component Date Value Ref Range Status    WBC 03/24/2024 10.36 (H)  4.31 - 10.16 Thousand/uL Final    RBC 03/24/2024 4.18  3.81 - 5.12 Million/uL Final    Hemoglobin 03/24/2024 12.3  11.5 - 15.4 g/dL Final    Hematocrit 03/24/2024 38.3  34.8 - 46.1 % Final    MCV 03/24/2024 92  82 - 98 fL Final    MCH 03/24/2024 29.4  26.8 - 34.3 pg Final    MCHC 03/24/2024 32.1  31.4 - 37.4 g/dL Final    RDW 03/24/2024 13.4  11.6 - 15.1 % Final    MPV 03/24/2024 9.8  8.9 - 12.7 fL Final    Platelets 03/24/2024 392 (H)  149 - 390 Thousands/uL Final    nRBC 03/24/2024 0  /100 WBCs Final    Segmented % 03/24/2024 67  43 - 75 % Final    Immature Grans % 03/24/2024 1  0 - 2 % Final    Lymphocytes % 03/24/2024 23  14 - 44 % Final    Monocytes % 03/24/2024 6  4 - 12 % Final    Eosinophils Relative 03/24/2024 3  0 - 6 % Final    Basophils Relative 03/24/2024 0  0 - 1 % Final    Absolute Neutrophils 03/24/2024 6.99  1.85 - 7.62 Thousands/µL Final    Absolute Immature Grans 03/24/2024 0.07  0.00 - 0.20 Thousand/uL Final    Absolute Lymphocytes 03/24/2024 2.34  0.60 - 4.47 Thousands/µL Final    Absolute Monocytes 03/24/2024 0.63  0.17 - 1.22 Thousand/µL Final    Eosinophils Absolute 03/24/2024 0.30  0.00 - 0.61 Thousand/µL Final    Basophils Absolute 03/24/2024 0.03  0.00 - 0.10 Thousands/µL Final    Sodium 03/24/2024 137  135 - 147 mmol/L Final    Potassium 03/24/2024 4.0  3.5 - 5.3 mmol/L Final    Chloride 03/24/2024 105  96 - 108 mmol/L Final    CO2 03/24/2024 26  21 - 32 mmol/L Final    ANION GAP 03/24/2024 6  4 - 13 mmol/L Final    BUN 03/24/2024 8  5 - 25 mg/dL Final    Creatinine 03/24/2024 0.39 (L)  0.60 - 1.30 mg/dL Final    Standardized to IDMS reference method     Glucose 03/24/2024 86  65 - 140 mg/dL Final    If the patient is fasting, the ADA then defines impaired fasting glucose as > 100 mg/dL and diabetes as > or equal to 123 mg/dL.    Calcium 03/24/2024 9.4  8.4 - 10.2 mg/dL Final    AST 03/24/2024 35  13 - 39 U/L Final    ALT 03/24/2024 48  7 - 52 U/L Final    Specimen collection should occur prior to Sulfasalazine administration due to the potential for falsely depressed results.     Alkaline Phosphatase 03/24/2024 63  34 - 104 U/L Final    Total Protein 03/24/2024 7.2  6.4 - 8.4 g/dL Final    Albumin 03/24/2024 4.1  3.5 - 5.0 g/dL Final    Total Bilirubin 03/24/2024 0.42  0.20 - 1.00 mg/dL Final    Use of this assay is not recommended for patients undergoing treatment with eltrombopag due to the potential for falsely elevated results.  N-acetyl-p-benzoquinone imine (metabolite of Acetaminophen) will generate erroneously low results in samples for patients that have taken an overdose of Acetaminophen.    eGFR 03/24/2024 140  ml/min/1.73sq m Final    Lipase 03/24/2024 6 (L)  11 - 82 u/L Final    LACTIC ACID 03/24/2024 0.6  0.5 - 2.0 mmol/L Final    Extra Tube 03/24/2024 Hold for add-ons.   Final    Auto resulted.    EXT Preg Test, Ur 03/24/2024 Negative   Final    Control 03/24/2024 Valid   Final   Admission on 03/23/2024, Discharged on 03/23/2024   Component Date Value Ref Range Status    WBC 03/23/2024 10.35 (H)  4.31 - 10.16 Thousand/uL Final    RBC 03/23/2024 4.22  3.81 - 5.12 Million/uL Final    Hemoglobin 03/23/2024 12.4  11.5 - 15.4 g/dL Final    Hematocrit 03/23/2024 38.5  34.8 - 46.1 % Final    MCV 03/23/2024 91  82 - 98 fL Final    MCH 03/23/2024 29.4  26.8 - 34.3 pg Final    MCHC 03/23/2024 32.2  31.4 - 37.4 g/dL Final    RDW 03/23/2024 13.4  11.6 - 15.1 % Final    MPV 03/23/2024 9.8  8.9 - 12.7 fL Final    Platelets 03/23/2024 355  149 - 390 Thousands/uL Final    nRBC 03/23/2024 0  /100 WBCs Final    Segmented % 03/23/2024 71  43 - 75 % Final    Immature  Grans % 03/23/2024 1  0 - 2 % Final    Lymphocytes % 03/23/2024 22  14 - 44 % Final    Monocytes % 03/23/2024 4  4 - 12 % Final    Eosinophils Relative 03/23/2024 2  0 - 6 % Final    Basophils Relative 03/23/2024 0  0 - 1 % Final    Absolute Neutrophils 03/23/2024 7.33  1.85 - 7.62 Thousands/µL Final    Absolute Immature Grans 03/23/2024 0.07  0.00 - 0.20 Thousand/uL Final    Absolute Lymphocytes 03/23/2024 2.28  0.60 - 4.47 Thousands/µL Final    Absolute Monocytes 03/23/2024 0.46  0.17 - 1.22 Thousand/µL Final    Eosinophils Absolute 03/23/2024 0.18  0.00 - 0.61 Thousand/µL Final    Basophils Absolute 03/23/2024 0.03  0.00 - 0.10 Thousands/µL Final    Sodium 03/23/2024 142  135 - 147 mmol/L Final    Potassium 03/23/2024 3.9  3.5 - 5.3 mmol/L Final    Chloride 03/23/2024 107  96 - 108 mmol/L Final    CO2 03/23/2024 27  21 - 32 mmol/L Final    ANION GAP 03/23/2024 8  4 - 13 mmol/L Final    BUN 03/23/2024 10  5 - 25 mg/dL Final    Creatinine 03/23/2024 0.42 (L)  0.60 - 1.30 mg/dL Final    Standardized to IDMS reference method    Glucose 03/23/2024 137  65 - 140 mg/dL Final    If the patient is fasting, the ADA then defines impaired fasting glucose as > 100 mg/dL and diabetes as > or equal to 123 mg/dL.    Calcium 03/23/2024 9.3  8.4 - 10.2 mg/dL Final    AST 03/23/2024 26  13 - 39 U/L Final    ALT 03/23/2024 32  7 - 52 U/L Final    Specimen collection should occur prior to Sulfasalazine administration due to the potential for falsely depressed results.     Alkaline Phosphatase 03/23/2024 59  34 - 104 U/L Final    Total Protein 03/23/2024 7.0  6.4 - 8.4 g/dL Final    Albumin 03/23/2024 4.0  3.5 - 5.0 g/dL Final    Total Bilirubin 03/23/2024 0.39  0.20 - 1.00 mg/dL Final    Use of this assay is not recommended for patients undergoing treatment with eltrombopag due to the potential for falsely elevated results.  N-acetyl-p-benzoquinone imine (metabolite of Acetaminophen) will generate erroneously low results in samples  for patients that have taken an overdose of Acetaminophen.    eGFR 03/23/2024 137  ml/min/1.73sq m Final    Lipase 03/23/2024 9 (L)  11 - 82 u/L Final    Color, UA 03/23/2024 Yellow   Final    Clarity, UA 03/23/2024 Slightly Cloudy   Final    Specific Gravity, UA 03/23/2024 >=1.030  1.005 - 1.030 Final    pH, UA 03/23/2024 6.5  4.5, 5.0, 5.5, 6.0, 6.5, 7.0, 7.5, 8.0 Final    Leukocytes, UA 03/23/2024 Negative  Negative Final    Nitrite, UA 03/23/2024 Negative  Negative Final    Protein, UA 03/23/2024 Trace (A)  Negative mg/dl Final    Glucose, UA 03/23/2024 Negative  Negative mg/dl Final    Ketones, UA 03/23/2024 Negative  Negative mg/dl Final    Urobilinogen, UA 03/23/2024 <2.0  <2.0 mg/dl mg/dl Final    Bilirubin, UA 03/23/2024 Negative  Negative Final    Occult Blood, UA 03/23/2024 Negative  Negative Final    EXT Preg Test, Ur 03/23/2024 Negative   Final    Control 03/23/2024 Valid   Final    RBC, UA 03/23/2024 2-4  None Seen, 0-1, 1-2, 2-4, 0-5 /hpf Final    WBC, UA 03/23/2024 1-2  None Seen, 0-1, 1-2, 0-5, 2-4 /hpf Final    Epithelial Cells 03/23/2024 Occasional  None Seen, Occasional /hpf Final    Bacteria, UA 03/23/2024 Occasional  None Seen, Occasional /hpf Final    MUCUS THREADS 03/23/2024 Occasional (A)  None Seen Final    Amorphous Crystals, UA 03/23/2024 Moderate   Final    Ca Oxalate Lena, UA 03/23/2024 Occasional (A)  None Seen /hpf Final   Appointment on 03/19/2024   Component Date Value Ref Range Status    SPECIMEN TYPE 03/19/2024 Comment   Final    Whole Blood      Recommendations 03/19/2024 Comment   Final    If the above result is positive, genetic counseling is  recommended to discuss the potential clinical and/or  reproductive implications, as well as recommendations for  testing family members and, when applicable, this  individual's partner. Genetic counseling services are  available. To access [facility] please  visit [email or web address]  or  call [phone] ([phone]).      ADDITIONAL CLINICAL INFORMATION 03/19/2024 Comment   Final    Cystic fibrosis (CF) is an autosomal recessive disorder  with variable severity and age at onset. Signs and symptoms  of classic CF may include elevated sweat chloride levels,  progressive lung disease, pancreatic insufficiency, and  male infertility. Symptoms of mild CF may include  pancreatic sufficiency. Symptoms of CFTR-related disorders  may include pancreatitis, bronchiectasis, and isolated male  infertility due to congenital absence of the vas deferens  (CBAVD). Treatment is dietary and supportive.  Genotype-targeted therapies may be available for some  individuals. In severely affected individuals, lung  transplantation may be indicated. (PMID:[id number]).      Interpretation 03/19/2024 Comment   Final    Negative Results    Disorders (Gene)    Result         Interpretation    Cystic fibrosis     NEGATIVE       This result reduces,  (CFTR)                             but does not  [id number]                        eliminate, the risk                                     to be a carrier.                                     Risk: NOT at an                                     increased risk for an                                     affected pregnancy.      METHODS/LIMITATIONS 03/19/2024 Comment   Final    Comment: Next-generation Sequencing: Genomic regions of interest are  selected using the [employer]) hybridization  capture method and sequenced via the [employer] next  generation sequencing platform. Sequencing reads are  aligned with the human genome reference GRCh37/hg19 build.  Regions of interest include coding exons, intron/exon  junctions (+/- 20 nucleotides) and additional genomic  regions with known significant pathogenic variants.  Analytical sensitivity at 30X coverage is estimated to be  >99% for single nucleotide variants, >99% for  insertions/deletions less than six base pairs and >96%  for  insertions/deletions between six and forty-five base pairs.  Variant detection is performed by [employer] and  in-house algorithms. Expected minimum size resolution for  CNVs in CFTR is 60 bp of coding sequence. Precise  breakpoints are not reported. Single-exon deletions or  duplications are not detected in some cases due to CNV size  limitations, or due to isolated data                            quality variation or  intrinsic sequence properties. Confirmatory testing by  orthogonal technologies includes [place] sequencing, MLPA  analysis.    Reported variants: Pathogenic and likely pathogenic  variants are reported for all tests. Benign and likely  benign variants are typically not reported. Variants of  uncertain significance are reported when included in the  test specification. Variants are specified using the  numbering and nomenclature recommended by the Human Genome  Variation Society (HGVS, http://www.hgvs.org/). Variant  classification and confirmation are consistent with ACMG  standards and guidelines (Richey, PMID:[id number]; Jacquie,  PMID:[id number]). Detailed variant classification information  and reevaluation are available upon request.    Limitations: Technologies used do not detect germline  mosaicism and do not rule out the presence of large  chromosomal aberrations including rearrangements and gene  fusions, or variants in regions or genes not included in  this                            test, or possible inter/intragenic interactions  between variants, or repeat expansions. Variant  classification and/or interpretation may change over time  if more information becomes available. False positive or  false negative results may occur for reasons that include:  rare genetic variants, sex chromosome abnormalities,  pseudogene interference, blood transfusions, bone marrow  transplantation, somatic or tissue-specific mosaicism,  mislabeled samples, or erroneous representation of  family  relationships.    This test was developed and its performance characteristics  determined by [facility]. It has not been cleared or approved  by the Food and Drug Administration.      Indication 03/19/2024 Comment   Final    Carrier Test / Screening      RESULT: 03/19/2024 Comment   Final    NEGATIVE      COMMENTS 03/19/2024 Comment   Final    This interpretation is based on the clinical information  provided and the current understanding of the molecular  genetics of the disorder(s) tested. Information about the  disorder(s) tested is available at  [email or web address].      Ethnicity 03/19/2024 Comment   Final    Not Provided      REFERENCES 03/19/2024 Comment   Final    Sharath JL, Amparo C, Malcom GR et al. CFTR variant  testing: a technical standard of the American College of  Medical Genetics and Genomics (ACMG). Isha Med 22, 1289  (2020). PMID: [id number]    Ruma T, Madhu SG, Panda BA, et al. Cystic Fibrosis  and Congenital Absence of the Vas Deferens. 2001 Mar26  [Updated 2017 Feb 2]. In: Bo MP, Alisson HH, Barbara RA,  et al., editors. GeneRyarelisws(R) [Internet]. PMID: [id number]    Kim C, Emre LJH, Alison D. et al. Next-generation  sequencing for constitutional variants in the clinical  laboratory, 2021 revision: a technical standard of the  American College of Medical Genetics and Genomics (ACMG).  Isha Med 23, 1210 (2021). PMID: [id number]    Kaiden S, Mia N, Alex S et al. Standards and guidelines  for the interpretation of sequence variants: a joint  consensus recommendation of the American College of Medical  Genetics and Genomics and the Association for Molecular  Pathology. Isha Med 17, 405 (2015). PMID: [id number]      Director Review/Release 03/19/2024 Comment   Final    Component Type       Performed At        Laboratory                                           Director    Technical            Laboratory          Kari Lindo,  component,           Corporation of      MD,  PhD  processing           [address],                       [place], [place],                       [id number]    Technical            Laboratory          Kari Lindo,  component,           Corporation of      MD, PhD  analysis             [address],                       [place],                       [id number]    Professional         Laboratory          Kari Lindo,  component            Corporation of      MD, PhD                       [address]                        [address],                       [place],                       [id number]    Electronically released by Tory De La Rosa, PhD, [facility]     Appointment on 03/13/2024   Component Date Value Ref Range Status    A-1 Antitrypsin 03/13/2024 155  100 - 188 mg/dL Final    Iron Saturation 03/13/2024 12 (L)  15 - 50 % Final    TIBC 03/13/2024 351  250 - 450 ug/dL Final    Iron 03/13/2024 43 (L)  50 - 212 ug/dL Final    Patients treated with metal-binding drugs (ie. Deferoxamine) may have depressed iron values.    UIBC 03/13/2024 308  155 - 355 ug/dL Final    Ferritin 03/13/2024 50  11 - 307 ng/mL Final   Appointment on 01/25/2024   Component Date Value Ref Range Status    Calprotectin 01/25/2024 6  0 - 120 ug/g Final    Concentration     Interpretation   Follow-Up  < 5 - 50 ug/g     Normal           None  >50 -120 ug/g     Borderline       Re-evaluate in 4-6 weeks      >120 ug/g     Abnormal         Repeat as clinically                                     indicated    Salmonella sp PCR 01/25/2024 Negative  Negative Final         Shigella sp/Enteroinvasive E. coli* 01/25/2024 Negative  Negative Final         Campylobacter sp (jejuni and coli)* 01/25/2024 Negative  Negative Final         Shiga toxin 1/Shiga toxin 2 genes * 01/25/2024 Negative  Negative Final         Pancreatic Elastase-1 01/25/2024 <50 (L)  >200 ug Elast./g Final    **Results verified by repeat testing**          Severe Pancreatic Insufficiency:          <100         Moderate Pancreatic Insufficiency:   100 - 200         Normal:                                   >200    Giardia Ag, Stl 01/25/2024 Negative  Negative Final    C.difficile toxin by PCR 01/25/2024 Negative  Negative Final        Appointment on 01/23/2024   Component Date Value Ref Range Status    REYES SYNDROME DNA ANALYSIS 01/23/2024 Not Detected   Final    Comment: Pathogenic variant not detected.  No pathogenic or likely pathogenic variants were found in the 11 genes included on this test. This result reduces (but does not eliminate) the likelihood of a diagnosis of hereditary breast and ovarian cancer (HBOC) syndrome, Reyes syndrome, and familial hypercholesterolemia (FH) for this individual. Other clinical diagnostic testing for these three conditions could identify variants not detected by this test. If this individual has had previous testing, these results should be taken into consideration during risk assessments and medical management.  No pathogenic or likely pathogenic variants were detected in the genes associated with Reyes syndrome. The genes tested for this condition were MLH1, MSH2, MSH6, PMS2, and EPCAM.  Evaluation of 11 genes associated with hereditary breast and ovarian cancer (HBOC) syndrome, Reyes syndrome and familial hypercholesterolemia (FH). The genes tested are BRCA1, BRCA2, MLH1, MSH2, MSH6, PMS2, EPCAM, APOB, LDLR,                            LDLRAP1, and PCSK9.  Genes tested: BRCA1,BRCA2,MLH1,MSH2,MSH6,PMS2,EPCAM,APOB,LDLR,LDLRAP1,PCSK9  Interpretive report performed and validated by [facility] GeneGuide TM ([facility]) Laboratory; [address] [place] [address] (CLIA# [id number], CAP #[id number]). A portion of testing may have been performed at a remote location. A list of remote personnel is available upon request. Sequencing done at: [employer] [address] [place] [address], [address], [place] [address]. (CLIA# [id number], CAP  #[id number]). This test has not been cleared or approved by the U.S. Food and Drug Administration.  approvals: Paulino Hernadez, Ph.D. [facility]  email: [email or web address]    HEREDITARY BREAST & OVARIAN CANCER* 01/23/2024 Not Detected   Final    Comment: Pathogenic variant not detected.  No pathogenic or likely pathogenic variants were found in the 11 genes included on this test. This result reduces (but does not eliminate) the likelihood of a diagnosis of hereditary breast and ovarian cancer (HBOC) syndrome, Smiley syndrome, and familial hypercholesterolemia (FH) for this individual. Other clinical diagnostic testing for these three conditions could identify variants not detected by this test. If this individual has had previous testing, these results should be taken into consideration during risk assessments and medical management.  No pathogenic or likely pathogenic variants were detected in the genes associated with hereditary breast and ovarian cancer (HBOC) syndrome. The genes tested for this condition were BRCA1 and BRCA2.  Evaluation of 11 genes associated with hereditary breast and ovarian cancer (HBOC) syndrome, Smiley syndrome and familial hypercholesterolemia (FH). The genes tested are BRCA1, BRCA2, MLH1, MSH2, MSH6, PMS2,                            EPCAM, APOB, LDLR, LDLRAP1, and PCSK9.  Genes tested: BRCA1,BRCA2,MLH1,MSH2,MSH6,PMS2,EPCAM,APOB,LDLR,LDLRAP1,PCSK9  Interpretive report performed and validated by [facility] GeneGuide TM ([facility]) Laboratory; [address] [place] [address] (CLIA# [id number], CAP #[id number]). A portion of testing may have been performed at a remote location. A list of remote personnel is available upon request. Sequencing done at: [facility] [address] [place] [address], [address], [place] [address]. (CLIA# [id number], CAP #[id number]). This test has not been cleared or approved by the U.S. Food and Drug Administration.  approvals: Paulino Hernadez, Ph.D. [facility]  email:  [email or web address]    FAMILIAL HYPERCHOLESTEROLEMIA DNA * 01/23/2024 Not Detected   Final    Comment: Pathogenic variant not detected.  No pathogenic or likely pathogenic variants were found in the 11 genes included on this test. This result reduces (but does not eliminate) the likelihood of a diagnosis of hereditary breast and ovarian cancer (HBOC) syndrome, Smiley syndrome, and familial hypercholesterolemia (FH) for this individual. Other clinical diagnostic testing for these three conditions could identify variants not detected by this test. If this individual has had previous testing, these results should be taken into consideration during risk assessments and medical management.  No pathogenic or likely pathogenic variants were detected in the genes associated with familial hypercholesterolemia (FH). The genes tested for this condition were APOB, LDLR, LDLRAP1, and PCSK9.  Evaluation of 11 genes associated with hereditary breast and ovarian cancer (HBOC) syndrome, Smiley syndrome and familial hypercholesterolemia (FH). The genes tested are BRCA1, BRCA2, MLH1, MSH2, MSH6, PMS2,                            EPCAM, APOB, LDLR, LDLRAP1, and PCSK9.  Genes tested: BRCA1,BRCA2,MLH1,MSH2,MSH6,PMS2,EPCAM,APOB,LDLR,LDLRAP1,PCSK9  Methods and Limitations: DNA extracted from this individual's sample was captured and enriched using a custom set of reagents (Helix Exome+ chemistry). Targeted regions were then sequenced using an Illumina DNA sequencing system. The individual's sequence was then matched to a modified version of the industry standard reference genome (GRCh38). Variant calling was completed using a customized version of [employer]'s myaNUMBERq software, requiring 20x coverage for validated variant calls. Copy number variants (CNVs) were called using a proprietary bioinfomatics pipeline that compared the coverage profile of the sample with the coverage profiles of other reference set samples. [facility]  then analyzed the generated variant data for the exons and 10 bp of flanking intronic sequence (and select tagged intronic variants) of the 11 genes included in [employer] from the                            [employer]. The sample was reviewed for single nucleotide variants (SNVs), indels up to 20 bp in length, and CNVs that are known or predicted to be actionable. NOTE: This assay has limited sensitivity to CNVs smaller than a few exons. APOB, PCSK9, and LDLR interpretation and reporting is specific to the familial hypercholesterolemia phenotype. Variants associated with other phenotypes such as hypobetalipoproteinemia are not included. Some known complex variants like the inversion of exons 1-7 in the MSH2 gene (Tino inversion), exons 11-15 of the PMS2 gene, or variants within or immediately adjacent to long homopolymer runs are not analyzed or reported. Additionally, there are regions that are not covered, such as deep intronic, promoter, and enhancer regions. It is important to note that this assay cannot detect all variants known to increase disease risk. Other clinical diagnostic testing for these three conditions could identify variants not detected by this Hel[employer] test.                            If this individual has had previous testing, these results should be taken into consideration during risk assessments and medical management.  Interpretive report performed and validated by [facility] GeneGuide TM ([facility]) [facility]; [address] [place] [address] (CLIA# [id number], CAP #[id number]). A portion of testing may have been performed at a remote location. A list of remote personnel is available upon request. Sequencing done at: [facility] [address] [place] [address], [address], [place] [address]. (CLIA# [id number], CAP #[id number]). This test has not been cleared or approved by the U.S. Food and Drug Administration.  approvals: Paulino Hernadez, Ph.D. [facility]  email: [email or web address]     TSH 3RD GENERATON 01/23/2024 1.013  0.450 - 4.500 uIU/mL Final    The recommended reference ranges for TSH during pregnancy are as follows:   First trimester 0.100 to 2.500 uIU/mL   Second trimester  0.200 to 3.000 uIU/mL   Third trimester 0.300 to 3.000 uIU/m    Note: Normal ranges may not apply to patients who are transgender, non-binary, or whose legal sex, sex at birth, and gender identity differ.  Adult TSH (3rd generation) reference range follows the recommended guidelines of the American Thyroid Association, January, 2020.    WBC 01/23/2024 9.45  4.31 - 10.16 Thousand/uL Final    RBC 01/23/2024 4.21  3.81 - 5.12 Million/uL Final    Hemoglobin 01/23/2024 12.7  11.5 - 15.4 g/dL Final    Hematocrit 01/23/2024 38.2  34.8 - 46.1 % Final    MCV 01/23/2024 91  82 - 98 fL Final    MCH 01/23/2024 30.2  26.8 - 34.3 pg Final    MCHC 01/23/2024 33.2  31.4 - 37.4 g/dL Final    RDW 01/23/2024 13.5  11.6 - 15.1 % Final    MPV 01/23/2024 9.7  8.9 - 12.7 fL Final    Platelets 01/23/2024 387  149 - 390 Thousands/uL Final    nRBC 01/23/2024 0  /100 WBCs Final    Segmented % 01/23/2024 68  43 - 75 % Final    Immature Grans % 01/23/2024 1  0 - 2 % Final    Lymphocytes % 01/23/2024 25  14 - 44 % Final    Monocytes % 01/23/2024 4  4 - 12 % Final    Eosinophils Relative 01/23/2024 2  0 - 6 % Final    Basophils Relative 01/23/2024 0  0 - 1 % Final    Absolute Neutrophils 01/23/2024 6.37  1.85 - 7.62 Thousands/µL Final    Absolute Immature Grans 01/23/2024 0.05  0.00 - 0.20 Thousand/uL Final    Absolute Lymphocytes 01/23/2024 2.37  0.60 - 4.47 Thousands/µL Final    Absolute Monocytes 01/23/2024 0.42  0.17 - 1.22 Thousand/µL Final    Eosinophils Absolute 01/23/2024 0.21  0.00 - 0.61 Thousand/µL Final    Basophils Absolute 01/23/2024 0.03  0.00 - 0.10 Thousands/µL Final    IgA 01/23/2024 267  87 - 352 mg/dL Final    Gliadin IgA 01/23/2024 4  0 - 19 units Final                       Negative                   0 - 19                      Weak Positive             20 - 30                     Moderate to Strong Positive   >30    Gliadin IgG 01/23/2024 2  0 - 19 units Final                       Negative                   0 - 19                     Weak Positive             20 - 30                     Moderate to Strong Positive   >30    Tissue Transglut Ab IGG 01/23/2024 <2  0 - 5 U/mL Final                                  Negative        0 - 5                                Weak Positive   6 - 9                                Positive           >9    TISSUE TRANSGLUTAMINASE IGA 01/23/2024 <2  0 - 3 U/mL Final                                  Negative        0 -  3                                Weak Positive   4 - 10                                Positive           >10   Tissue Transglutaminase (tTG) has been identified   as the endomysial antigen.  Studies have demonstr-   ated that endomysial IgA antibodies have over 99%   specificity for gluten sensitive enteropathy.    Endomysial IgA 01/23/2024 Negative  Negative Final    Sodium 01/23/2024 140  135 - 147 mmol/L Final    Potassium 01/23/2024 3.4 (L)  3.5 - 5.3 mmol/L Final    Chloride 01/23/2024 107  96 - 108 mmol/L Final    CO2 01/23/2024 23  21 - 32 mmol/L Final    ANION GAP 01/23/2024 10  mmol/L Final    BUN 01/23/2024 9  5 - 25 mg/dL Final    Creatinine 01/23/2024 0.39 (L)  0.60 - 1.30 mg/dL Final    Standardized to IDMS reference method    Glucose, Fasting 01/23/2024 145 (H)  65 - 99 mg/dL Final    Calcium 01/23/2024 9.7  8.4 - 10.2 mg/dL Final    AST 01/23/2024 20  13 - 39 U/L Final    ALT 01/23/2024 24  7 - 52 U/L Final    Specimen collection should occur prior to Sulfasalazine administration due to the potential for falsely depressed results.     Alkaline Phosphatase 01/23/2024 61  34 - 104 U/L Final    Total Protein 01/23/2024 7.1  6.4 - 8.4 g/dL Final    Albumin 01/23/2024 4.1  3.5 - 5.0 g/dL Final    Total Bilirubin 01/23/2024 0.25  0.20 - 1.00 mg/dL Final    Use of this assay is not  recommended for patients undergoing treatment with eltrombopag due to the potential for falsely elevated results.  N-acetyl-p-benzoquinone imine (metabolite of Acetaminophen) will generate erroneously low results in samples for patients that have taken an overdose of Acetaminophen.    eGFR 01/23/2024 140  ml/min/1.73sq m Final   Orders Only on 12/07/2023   Component Date Value Ref Range Status    Quantiferon Incubation Comment 12/07/2023 Incubation performed.   Final    Quantiferon-TB Gold Plus 12/07/2023 Negative  Negative Final    Comment: No response to M tuberculosis antigens detected.  Infection with M tuberculosis is unlikely, but high risk  individuals should be considered for additional testing  (ATS/IDSA/CDC Clinical Practice Guidelines, 2017). The  reference range is an Antigen minus Nil result of <0.35 IU/mL.  Chemiluminescence immunoassay methodology      QuantiFERON Criteria 12/07/2023 Comment   Final    Comment: QuantiFERON-TB Gold Plus is a qualitative indirect test for  M tuberculosis infection (including disease) and is intended for use  in conjunction with risk assessment, radiography, and other medical  and diagnostic evaluations. The QuantiFERON-TB Gold Plus result is  determined by subtracting the Nil value from either TB antigen (Ag)  value. The Mitogen tube serves as a control for the test.      LC QFT TB1-NIL 12/07/2023 0.03  IU/mL Final    LC QFT TB2-NIL 12/07/2023 0.01  IU/mL Final    LC QFT NIL 12/07/2023 0.00  IU/mL Final    LC QFT MITOGEN-NIL 12/07/2023 >10.00  IU/mL Final   There may be more visits with results that are not included.

## 2024-05-16 ENCOUNTER — TELEPHONE (OUTPATIENT)
Age: 34
End: 2024-05-16

## 2024-05-16 LAB — RHEUMATOID FACT SER QL LA: NEGATIVE

## 2024-05-17 ENCOUNTER — TELEPHONE (OUTPATIENT)
Age: 34
End: 2024-05-17

## 2024-05-17 LAB — CCP AB SER IA-ACNC: 0.8

## 2024-05-17 NOTE — TELEPHONE ENCOUNTER
Can you call regarding paperwork. 
Patient calling per her Rheumatologist advised to have PCP complete paperwork to be able to have Handicapped Placard.    Please review and advise.  Thank You.  
No

## 2024-05-21 ENCOUNTER — NURSE TRIAGE (OUTPATIENT)
Age: 34
End: 2024-05-21

## 2024-05-21 ENCOUNTER — TELEPHONE (OUTPATIENT)
Age: 34
End: 2024-05-21

## 2024-05-21 DIAGNOSIS — E78.2 MIXED HYPERLIPIDEMIA: Primary | ICD-10-CM

## 2024-05-21 NOTE — TELEPHONE ENCOUNTER
Patient called she is asking when she should have labs done next, she said she usually has labs done every 6 months.  Also, she would like to quit smoking, patient has tried OTC Nicotine patches and gum and has not helped.    Patient uses [email or web address] in [place].    Thank you

## 2024-05-21 NOTE — TELEPHONE ENCOUNTER
"Incoming call with reported side effect of taking Naproxen:    Patient states that she has chronic nose bleeds but noticed a worsening since taking Naproxen as of a few days ago. It was helping with the pain. Denies other symptoms.    Requesting an alternative by Dr. Deleon.     Reason for Disposition   Caller has NON-URGENT medicine question about med that PCP or specialist prescribed and triager unable to answer question    Answer Assessment - Initial Assessment Questions  1. NAME of MEDICATION: \"What medicine are you calling about?\"      Naproxen  2. QUESTION: \"What is your question?\" (e.g., medication refill, side effect)      Side effect  3. PRESCRIBING HCP: \"Who prescribed it?\" Reason: if prescribed by specialist, call should be referred to that group.      Dr. Deleon  4. SYMPTOMS: \"Do you have any symptoms?\"      Nose bleeds  5. SEVERITY: If symptoms are present, ask \"Are they mild, moderate or severe?\"      persistent    Protocols used: Medication Question Call-ADULT-[place]    "

## 2024-05-22 DIAGNOSIS — M19.90 OSTEOARTHRITIS, UNSPECIFIED OSTEOARTHRITIS TYPE, UNSPECIFIED SITE: Primary | ICD-10-CM

## 2024-05-22 RX ORDER — MELOXICAM 15 MG/1
15 TABLET ORAL DAILY
Qty: 30 TABLET | Refills: 6 | Status: SHIPPED | OUTPATIENT
Start: 2024-05-22

## 2024-05-22 NOTE — TELEPHONE ENCOUNTER
Patient would like to have lipid panel done. She will stop in today to  lab work order when ready. OV made for 5/29/24 1147 for smoking cessation.

## 2024-05-22 NOTE — TELEPHONE ENCOUNTER
DIXIE Lozada   She had a CBC and CMP done in March when she was in the ER. The only thing that we would add would be a lipid panel which she had done in dec. Does she want an order for a lipid panel? She should discuss smoking cessation with Yenifer when she returns from [facility].     [facility] Lipid order placed

## 2024-05-22 NOTE — TELEPHONE ENCOUNTER
Dr. Deleon    Pt states she will take either medication, whichever you think will work best: Celecoxib or Meloxicam.    Please send prescription to [facility] in EMR and notify pt when done.

## 2024-05-23 ENCOUNTER — APPOINTMENT (OUTPATIENT)
Dept: RADIOLOGY | Facility: HOSPITAL | Age: 34
End: 2024-05-23
Payer: COMMERCIAL

## 2024-05-23 ENCOUNTER — HOSPITAL ENCOUNTER (EMERGENCY)
Facility: HOSPITAL | Age: 34
Discharge: HOME | End: 2024-05-23
Attending: EMERGENCY MEDICINE
Payer: COMMERCIAL

## 2024-05-23 VITALS
OXYGEN SATURATION: 98 % | SYSTOLIC BLOOD PRESSURE: 145 MMHG | HEART RATE: 61 BPM | BODY MASS INDEX: 52.74 KG/M2 | DIASTOLIC BLOOD PRESSURE: 91 MMHG | RESPIRATION RATE: 16 BRPM | WEIGHT: 293 LBS | TEMPERATURE: 98.4 F

## 2024-05-23 DIAGNOSIS — R07.9 CHEST PAIN: Primary | ICD-10-CM

## 2024-05-23 DIAGNOSIS — K21.9 GERD (GASTROESOPHAGEAL REFLUX DISEASE): ICD-10-CM

## 2024-05-23 LAB
ALBUMIN SERPL BCP-MCNC: 4.1 G/DL (ref 3.5–5)
ALP SERPL-CCNC: 63 U/L (ref 34–104)
ALT SERPL W P-5'-P-CCNC: 19 U/L (ref 7–52)
ANION GAP SERPL CALCULATED.3IONS-SCNC: 7 MMOL/L (ref 4–13)
AST SERPL W P-5'-P-CCNC: 10 U/L (ref 13–39)
ATRIAL RATE: 67 BPM
BASOPHILS # BLD AUTO: 0.02 THOUSANDS/ÂΜL (ref 0–0.1)
BASOPHILS NFR BLD AUTO: 0 % (ref 0–1)
BILIRUB SERPL-MCNC: 0.36 MG/DL (ref 0.2–1)
BUN SERPL-MCNC: 7 MG/DL (ref 5–25)
CALCIUM SERPL-MCNC: 9.9 MG/DL (ref 8.4–10.2)
CARDIAC TROPONIN I PNL SERPL HS: 3 NG/L
CHLORIDE SERPL-SCNC: 105 MMOL/L (ref 96–108)
CO2 SERPL-SCNC: 27 MMOL/L (ref 21–32)
CREAT SERPL-MCNC: 0.3 MG/DL (ref 0.6–1.3)
EOSINOPHIL # BLD AUTO: 0.14 THOUSAND/ÂΜL (ref 0–0.61)
EOSINOPHIL NFR BLD AUTO: 2 % (ref 0–6)
ERYTHROCYTE [DISTWIDTH] IN BLOOD BY AUTOMATED COUNT: 13.6 % (ref 11.6–15.1)
GFR SERPL CREATININE-BSD FRML MDRD: 153 ML/MIN/1.73SQ M
GLUCOSE SERPL-MCNC: 92 MG/DL (ref 65–140)
HCT VFR BLD AUTO: 39 % (ref 34.8–46.1)
HGB BLD-MCNC: 12.7 G/DL (ref 11.5–15.4)
IMM GRANULOCYTES # BLD AUTO: 0.04 THOUSAND/UL (ref 0–0.2)
IMM GRANULOCYTES NFR BLD AUTO: 0 % (ref 0–2)
LYMPHOCYTES # BLD AUTO: 2.15 THOUSANDS/ÂΜL (ref 0.6–4.47)
LYMPHOCYTES NFR BLD AUTO: 23 % (ref 14–44)
MCH RBC QN AUTO: 29.1 PG (ref 26.8–34.3)
MCHC RBC AUTO-ENTMCNC: 32.6 G/DL (ref 31.4–37.4)
MCV RBC AUTO: 89 FL (ref 82–98)
MONOCYTES # BLD AUTO: 0.51 THOUSAND/ÂΜL (ref 0.17–1.22)
MONOCYTES NFR BLD AUTO: 5 % (ref 4–12)
NEUTROPHILS # BLD AUTO: 6.68 THOUSANDS/ÂΜL (ref 1.85–7.62)
NEUTS SEG NFR BLD AUTO: 70 % (ref 43–75)
NRBC BLD AUTO-RTO: 0 /100 WBCS
P AXIS: 16 DEGREES
PLATELET # BLD AUTO: 347 THOUSANDS/UL (ref 149–390)
PMV BLD AUTO: 9.5 FL (ref 8.9–12.7)
POTASSIUM SERPL-SCNC: 3.8 MMOL/L (ref 3.5–5.3)
PR INTERVAL: 144 MS
PROT SERPL-MCNC: 7.4 G/DL (ref 6.4–8.4)
QRS AXIS: -5 DEGREES
QRSD INTERVAL: 92 MS
QT INTERVAL: 418 MS
QTC INTERVAL: 441 MS
RBC # BLD AUTO: 4.37 MILLION/UL (ref 3.81–5.12)
SODIUM SERPL-SCNC: 139 MMOL/L (ref 135–147)
T WAVE AXIS: 10 DEGREES
VENTRICULAR RATE: 67 BPM
WBC # BLD AUTO: 9.54 THOUSAND/UL (ref 4.31–10.16)

## 2024-05-23 PROCEDURE — 71046 X-RAY EXAM CHEST 2 VIEWS: CPT

## 2024-05-23 PROCEDURE — 99285 EMERGENCY DEPT VISIT HI MDM: CPT

## 2024-05-23 PROCEDURE — 93005 ELECTROCARDIOGRAM TRACING: CPT

## 2024-05-23 PROCEDURE — 93010 ELECTROCARDIOGRAM REPORT: CPT | Performed by: INTERNAL MEDICINE

## 2024-05-23 PROCEDURE — 84484 ASSAY OF TROPONIN QUANT: CPT

## 2024-05-23 PROCEDURE — 36415 COLL VENOUS BLD VENIPUNCTURE: CPT

## 2024-05-23 PROCEDURE — 80053 COMPREHEN METABOLIC PANEL: CPT

## 2024-05-23 PROCEDURE — 85025 COMPLETE CBC W/AUTO DIFF WBC: CPT

## 2024-05-23 RX ORDER — SUCRALFATE 1 G/1
1 TABLET ORAL ONCE
Status: COMPLETED | OUTPATIENT
Start: 2024-05-23 | End: 2024-05-23

## 2024-05-23 RX ORDER — SUCRALFATE 1 G/1
1 TABLET ORAL 4 TIMES DAILY
Qty: 20 TABLET | Refills: 0 | Status: SHIPPED | OUTPATIENT
Start: 2024-05-23 | End: 2024-05-28

## 2024-05-23 RX ORDER — FAMOTIDINE 20 MG/1
20 TABLET, FILM COATED ORAL 2 TIMES DAILY
Qty: 28 TABLET | Refills: 0 | Status: SHIPPED | OUTPATIENT
Start: 2024-05-23 | End: 2024-06-06

## 2024-05-23 RX ADMIN — SUCRALFATE 1 G: 1 TABLET ORAL at 16:17

## 2024-05-23 NOTE — DISCHARGE INSTRUCTIONS
Anesthesia Transfer of Care Note    Patient: Martha Butler    Procedure(s) Performed: Procedure(s) (LRB):  ASPIRATION, BONE MARROW (N/A)    Patient location: PACU    Anesthesia Type: general    Transport from OR: Transported from OR on 2-3 L/min O2 by NC with adequate spontaneous ventilation    Post pain: adequate analgesia    Post assessment: no apparent anesthetic complications    Post vital signs: stable    Level of consciousness: responds to stimulation    Nausea/Vomiting: no nausea/vomiting    Complications: none    Transfer of care protocol was followed      Last vitals:   Visit Vitals  BP (!) 87/50 (BP Location: Left arm, Patient Position: Lying)   Pulse 70   Temp 36.6 °C (97.9 °F)   Resp 20   Ht 5' (1.524 m)   Wt 55 kg (121 lb 4.1 oz)   LMP 03/14/2022 (Approximate)   SpO2 100%   Breastfeeding No   BMI 23.68 kg/m²      Take the prescribed medications as directed to treat your suspected reflux.  Continue to monitor your chest pain.  Promptly return to the ER if develop fever, new or worsening chest pain, difficulty breathing, vomiting, weakness, confusion, or lethargy.

## 2024-05-23 NOTE — ED PROVIDER NOTES
History  Chief Complaint   Patient presents with    Chest Pain     Left sided chest pain that radiates to left breast. Started four days ago. Denies SOB. Increase pain upon inspiration and ambulation.      The patient is a 33-year-old female with PMH of asthma, HTN, and migraines presenting for evaluation of chest pain.  The patient notes 4 days ago starting with a constant pain to the middle and left chest described as aching.  She denies associated headaches, lightheadedness or passing out episodes, palpitations, shortness of breath.  She notes increased stressors at home and believes this may be contributing to her pain.  She does report a history of GERD and has not been on medication for that.      Chest Pain  Associated symptoms: no abdominal pain, no cough, no fever, no nausea, no palpitations, no shortness of breath and not vomiting        Prior to Admission Medications   Prescriptions Last Dose Informant Patient Reported? Taking?   Pancrelipase, Lip-Prot-Amyl, (Creon) 08902-539770 units CPEP   No No   Sig: Take 1 capsule (36,000 Units total) by mouth 3 (three) times a day with meals   albuterol (PROVENTIL HFA,VENTOLIN HFA) 90 mcg/act inhaler  Self Yes No   Sig: Inhale 1 puff every 6 (six) hours as needed   atorvastatin (LIPITOR) 10 mg tablet   No No   Sig: TAKE 1 TABLET BY MOUTH EVERY DAY   lisinopril (ZESTRIL) 40 mg tablet   No No   Sig: TAKE 1 TABLET BY MOUTH EVERY DAY   meloxicam (Mobic) 15 mg tablet   No No   Sig: Take 1 tablet (15 mg total) by mouth daily   metroNIDAZOLE (METROGEL) 0.75 % vaginal gel   No No   Sig: Insert 1 Application into the vagina daily at bedtime 1 applicator full (approx 5 grams) vaginally x 5 days.   nortriptyline (PAMELOR) 10 mg capsule   No No   Sig: Take 1 capsule (10 mg total) by mouth daily at bedtime   ondansetron (ZOFRAN) 4 mg tablet   No No   Sig: Take 1 tablet (4 mg total) by mouth every 8 (eight) hours as needed for nausea or vomiting   predniSONE 20 mg tablet   No No    Sig: Take 1 tablet (20 mg total) by mouth daily   propranolol (INDERAL) 40 mg tablet   Yes No   Sig: Take 40 mg by mouth 2 (two) times a day   tiZANidine (ZANAFLEX) 2 mg tablet   No No   Sig: Take 1 tablet (2 mg total) by mouth every 8 (eight) hours as needed for muscle spasms      Facility-Administered Medications: None       Past Medical History:   Diagnosis Date    Asthma     Hypertension     Migraine     Miscarriage     Ventral hernia        Past Surgical History:   Procedure Laterality Date     SECTION      CHOLECYSTECTOMY  2012    KNEE SURGERY         Family History   Problem Relation Age of Onset    Hypertension Mother     Asthma Mother     Migraines Mother     Seizures Mother     ADD / ADHD Father     Breast cancer Sister     Colon cancer Neg Hx     Colon polyps Neg Hx      I have reviewed and agree with the history as documented.    E-Cigarette/Vaping    E-Cigarette Use Current Every Day User      E-Cigarette/Vaping Substances    Nicotine Yes     THC No     CBD No     Flavoring No     Other No     Unknown No      Social History     Tobacco Use    Smoking status: Every Day     Current packs/day: 1.00     Average packs/day: 1 pack/day for 20.0 years (20.0 ttl pk-yrs)     Types: Cigarettes    Smokeless tobacco: Never    Tobacco comments:     Pt vapes on occasion, pt smoked hookah in the past    Vaping Use    Vaping status: Every Day    Substances: Nicotine   Substance Use Topics    Alcohol use: No    Drug use: Yes     Types: Marijuana       Review of Systems   Constitutional:  Negative for chills and fever.   HENT: Negative.     Respiratory:  Negative for cough and shortness of breath.    Cardiovascular:  Positive for chest pain. Negative for palpitations and leg swelling.   Gastrointestinal:  Negative for abdominal pain, diarrhea, nausea and vomiting.   All other systems reviewed and are negative.      Physical Exam  Physical Exam  Vitals and nursing note reviewed.   Constitutional:        General: She is awake. She is not in acute distress.     Appearance: Normal appearance. She is well-developed. She is not ill-appearing, toxic-appearing or diaphoretic.   HENT:      Head: Normocephalic and atraumatic.      Jaw: There is normal jaw occlusion.      Nose: Nose normal.      Mouth/Throat:      Lips: Pink. No lesions.      Mouth: Mucous membranes are moist.   Eyes:      General: Lids are normal. Vision grossly intact. Gaze aligned appropriately.      Extraocular Movements: Extraocular movements intact.      Conjunctiva/sclera: Conjunctivae normal.   Neck:      Trachea: Phonation normal. No abnormal tracheal secretions.   Cardiovascular:      Rate and Rhythm: Normal rate.      Pulses:           Radial pulses are 2+ on the right side and 2+ on the left side.        Dorsalis pedis pulses are 2+ on the right side and 2+ on the left side.        Posterior tibial pulses are 2+ on the right side and 2+ on the left side.      Heart sounds: Normal heart sounds, S1 normal and S2 normal. No murmur heard.  Pulmonary:      Effort: Pulmonary effort is normal. No tachypnea or respiratory distress.      Breath sounds: Normal breath sounds and air entry. No stridor, decreased air movement or transmitted upper airway sounds. No decreased breath sounds.   Chest:   Breasts:     Right: No swelling, inverted nipple, mass or nipple discharge.      Left: No swelling, inverted nipple, mass or nipple discharge.   Musculoskeletal:         General: Normal range of motion.      Cervical back: Neck supple.      Right lower leg: No edema.      Left lower leg: No edema.      Comments: MÁRQUEZ, 5/5 strength throughout, sensation intact, no focal joint swelling. Ambulatory with steady gait.   Skin:     General: Skin is warm and dry.      Capillary Refill: Capillary refill takes less than 2 seconds.      Findings: No rash or wound.   Neurological:      General: No focal deficit present.      Mental Status: She is alert and oriented to person,  place, and time. Mental status is at baseline.   Psychiatric:         Behavior: Behavior is cooperative.         Vital Signs  ED Triage Vitals   Temperature Pulse Respirations Blood Pressure SpO2   05/23/24 1315 05/23/24 1315 05/23/24 1315 05/23/24 1315 05/23/24 1315   98.4 °F (36.9 °C) 77 18 (!) 183/88 99 %      Temp Source Heart Rate Source Patient Position - Orthostatic VS BP Location FiO2 (%)   05/23/24 1315 05/23/24 1315 05/23/24 1605 05/23/24 1315 --   Oral Monitor Sitting Right arm       Pain Score       05/23/24 1605       5           Vitals:    05/23/24 1315 05/23/24 1605   BP: (!) 183/88 145/91   Pulse: 77 61   Patient Position - Orthostatic VS:  Sitting         Visual Acuity      ED Medications  Medications   sucralfate (CARAFATE) tablet 1 g (1 g Oral Given 5/23/24 1617)       Diagnostic Studies  Results Reviewed       Procedure Component Value Units Date/Time    [facility] [place] draw [[id number]] Collected: 05/23/24 1321    Lab Status: Final result Specimen: Blood from Arm, Left Updated: 05/23/24 1501    Narrative:      The following orders were created for panel order [facility] [place] draw.  Procedure                               Abnormality         Status                     ---------                               -----------         ------                     Gold top on hold[[id number]]                                 Final result                 Please view results for these tests on the individual orders.    HS Troponin 0hr (reflex protocol) [[id number]]  (Normal) Collected: 05/23/24 1320    Lab Status: Final result Specimen: Blood from Arm, Left Updated: 05/23/24 1349     hs TnI 0hr 3 ng/L     Comprehensive metabolic panel [[id number]]  (Abnormal) Collected: 05/23/24 1320    Lab Status: Final result Specimen: Blood from Arm, Left Updated: 05/23/24 1342     Sodium 139 mmol/L      Potassium 3.8 mmol/L      Chloride 105 mmol/L      CO2 27 mmol/L      ANION GAP 7 mmol/L      BUN 7 mg/dL      Creatinine 0.30 mg/dL       Glucose 92 mg/dL      Calcium 9.9 mg/dL      AST 10 U/L      ALT 19 U/L      Alkaline Phosphatase 63 U/L      Total Protein 7.4 g/dL      Albumin 4.1 g/dL      Total Bilirubin 0.36 mg/dL      eGFR 153 ml/min/1.73sq m     Narrative:      National Kid[employer] Disease Foundation guidelines for Chronic Kidney Disease (CKD):     Stage 1 with normal or high GFR (GFR > 90 mL/min/1.73 square meters)    Stage 2 Mild CKD (GFR = 60-89 mL/min/1.73 square meters)    Stage 3A Moderate CKD (GFR = 45-59 mL/min/1.73 square meters)    Stage 3B Moderate CKD (GFR = 30-44 mL/min/1.73 square meters)    Stage 4 Severe CKD (GFR = 15-29 mL/min/1.73 square meters)    Stage 5 End Stage CKD (GFR <15 mL/min/1.73 square meters)  Note: GFR calculation is accurate only with a steady state creatinine    CBC and differential [[id number]] Collected: 05/23/24 1320    Lab Status: Final result Specimen: Blood from Arm, Left Updated: 05/23/24 1327     WBC 9.54 Thousand/uL      RBC 4.37 Million/uL      Hemoglobin 12.7 g/dL      Hematocrit 39.0 %      MCV 89 fL      MCH 29.1 pg      MCHC 32.6 g/dL      RDW 13.6 %      MPV 9.5 fL      Platelets 347 Thousands/uL      nRBC 0 /100 WBCs      Segmented % 70 %      Immature Grans % 0 %      Lymphocytes % 23 %      Monocytes % 5 %      Eosinophils Relative 2 %      Basophils Relative 0 %      Absolute Neutrophils 6.68 Thousands/µL      Absolute Immature Grans 0.04 Thousand/uL      Absolute Lymphocytes 2.15 Thousands/µL      Absolute Monocytes 0.51 Thousand/µL      Eosinophils Absolute 0.14 Thousand/µL      Basophils Absolute 0.02 Thousands/µL                    XR chest pa & lateral   ED Interpretation by DIXIE Dooley (05/23 9774)   No acute cardiopulmonary disease identified by me.                 Procedures  ECG 12 Lead Documentation Only    Date/Time: 5/23/2024 4:15 PM    Performed by: DIXIE Dooley  Authorized by: DIXIE Dooley    Indications / Diagnosis:  CP  ECG reviewed by me, the ED Provider: yes     Patient location:  ED  Previous ECG:     Previous ECG:  Unavailable  Interpretation:     Interpretation: non-specific    Rate:     ECG rate:  67    ECG rate assessment: normal    Rhythm:     Rhythm: sinus rhythm    Ectopy:     Ectopy: none    QRS:     QRS axis:  Normal    QRS intervals:  Normal  Conduction:     Conduction: normal    ST segments:     ST segments:  Normal  T waves:     T waves: inverted      Inverted:  III, aVR and V2  Comments:      Sinus rhythm, normal axis, normal intervals, nonspecific T wave abnormalities, no acute ischemic changes read by me           ED Course             HEART Risk Score      Flowsheet Row Most Recent Value   Heart Score Risk Calculator    History 0 Filed at: 05/23/2024 1622   ECG 1 Filed at: 05/23/2024 1622   Age 0 Filed at: 05/23/2024 1622   Risk Factors 2 Filed at: 05/23/2024 1622   Troponin 0 Filed at: 05/23/2024 1622   HEART Score 3 Filed at: 05/23/2024 1622                          SBIRT 20yo+      Flowsheet Row Most Recent Value   Initial Alcohol Screen: US AUDIT-C     1. How often do you have a drink containing alcohol? 0 Filed at: 05/23/2024 1317   2. How many drinks containing alcohol do you have on a typical day you are drinking?  0 Filed at: 05/23/2024 1317   3a. Male UNDER 65: How often do you have five or more drinks on one occasion? 0 Filed at: 05/23/2024 1317   3b. FEMALE Any Age, or MALE 65+: How often do you have 4 or more drinks on one occassion? 0 Filed at: 05/23/2024 1317   Audit-C Score 0 Filed at: 05/23/2024 1317   TRINITY: How many times in the past year have you...    Used an illegal drug or used a prescription medication for non-medical reasons? Never Filed at: 05/23/2024 1317                      Medical Decision Making  DDX including but not limited to: Chest wall pain, GERD, gastritis, esophagitis; considered but doubt ACS or PE or cardiac arrhythmia    Patient with 4 days of constant midsternal to left-sided chest discomfort worsened with  anxiety/stress.  EKG with nonspecific T wave changes, otherwise nonischemic.  Troponin negative.  Head to physical exam benign.  Blood pressure rechecked and downtrending without intervention.  Patient does have a history of GERD and does not currently take any medications for it, will start her on a regimen until she follows up with primary care.  Discussed managing stressors.  Plan for discharge home, follow-up with primary care, and strict return precautions.    Problems Addressed:  Chest pain: acute illness or injury  GERD (gastroesophageal reflux disease): chronic illness or injury    Amount and/or Complexity of Data Reviewed  Radiology: independent interpretation performed.  ECG/medicine tests: ordered and independent interpretation performed.    Risk  OTC drugs.  Prescription drug management.             Disposition  Final diagnoses:   Chest pain   GERD (gastroesophageal reflux disease)     Time reflects when diagnosis was documented in both MDM as applicable and the Disposition within this note       Time User Action Codes Description Comment    5/23/2024  4:22 PM Nga Ge [R07.9] Chest pain     5/23/2024  4:23 PM Nga Ge [K21.9] GERD (gastroesophageal reflux disease)           ED Disposition       ED Disposition   Discharge    Condition   Stable    Date/Time   Thu May 23, 2024 1622    Comment   Jennifer Johnson discharge to home/self care.                   Follow-up Information       Follow up With Specialties Details Why Contact Info    [facility] Schedule an appointment as soon as possible for a visit on 5/27/2024  [address] [place] [id number]  [phone]              Patient's Medications   Discharge Prescriptions    FAMOTIDINE (PEPCID) 20 MG TABLET    Take 1 tablet (20 mg total) by mouth 2 (two) times a day for 14 days       Start Date: 5/23/2024 End Date: 6/6/2024       Order Dose: 20 mg       Quantity: 28 tablet    Refills: 0    SUCRALFATE  (CARAFATE) 1 G TABLET    Take 1 tablet (1 g total) by mouth 4 (four) times a day for 5 days       Start Date: 5/23/2024 End Date: 5/28/2024       Order Dose: 1 g       Quantity: 20 tablet    Refills: 0       No discharge procedures on file.    PDMP Review       None            ED Provider  Electronically Signed by             DIXIE Dooley  05/23/24 8373

## 2024-05-24 ENCOUNTER — TRANSCRIBE ORDERS (OUTPATIENT)
Age: 34
End: 2024-05-24

## 2024-05-24 ENCOUNTER — CONSULT (OUTPATIENT)
Age: 34
End: 2024-05-24
Payer: COMMERCIAL

## 2024-05-24 ENCOUNTER — TELEPHONE (OUTPATIENT)
Dept: FAMILY MEDICINE CLINIC | Facility: CLINIC | Age: 34
End: 2024-05-24

## 2024-05-24 VITALS
HEIGHT: 63 IN | HEART RATE: 58 BPM | DIASTOLIC BLOOD PRESSURE: 62 MMHG | RESPIRATION RATE: 16 BRPM | BODY MASS INDEX: 51.91 KG/M2 | SYSTOLIC BLOOD PRESSURE: 116 MMHG | WEIGHT: 293 LBS

## 2024-05-24 DIAGNOSIS — E66.01 OBESITY, MORBID, BMI 50 OR HIGHER (HCC): Primary | ICD-10-CM

## 2024-05-24 DIAGNOSIS — E78.5 HLD (HYPERLIPIDEMIA): ICD-10-CM

## 2024-05-24 DIAGNOSIS — I10 BENIGN ESSENTIAL HTN: ICD-10-CM

## 2024-05-24 DIAGNOSIS — G43.911 INTRACTABLE MIGRAINE WITH STATUS MIGRAINOSUS, UNSPECIFIED MIGRAINE TYPE: ICD-10-CM

## 2024-05-24 DIAGNOSIS — F31.9 BIPOLAR DEPRESSION (HCC): ICD-10-CM

## 2024-05-24 DIAGNOSIS — K86.89 PANCREATIC INSUFFICIENCY: ICD-10-CM

## 2024-05-24 DIAGNOSIS — M79.7 FIBROMYALGIA: ICD-10-CM

## 2024-05-24 DIAGNOSIS — K76.0 NAFLD (NONALCOHOLIC FATTY LIVER DISEASE): ICD-10-CM

## 2024-05-24 PROCEDURE — 99244 OFF/OP CNSLTJ NEW/EST MOD 40: CPT | Performed by: SURGERY

## 2024-05-24 NOTE — PROGRESS NOTES
BARIATRIC INITIAL CONSULT - BARIATRIC SURGERY    Jennifer Johnson 33 y.o. female MRN: [id number]  Unit/Bed#:  Encounter: [id number]      HPI:  Jennifer Johnson is a very pleasant 33 y.o. female who presents with a longstanding history of morbid obesity and inability to sustain a meaningful weight loss.    Here today to discuss bariatric options. She is a [place] resident on disability  Body mass index is 52.47 kg/m².    ++Suffers from fibromyalgia, HTN, HLD, NAFLD, migraines, pancreatic insufficiency, MICHAEL  S/p R knee sx,   **current cigarette smoker & vaping nicotine     Visit type: consultation     Symptoms: excess weight, weight increase, inability to loss weight, fatigue, and joint pain    Associated Symptoms: depressed mood and anxiety    Associated Conditions: hyperlipidemia, sleep apnea, and abdominal obesity  Disease Complications: hypertension, sleep apnea, and liver steatosis  Weight Loss Interest: high  Previous Diet Trials: low carb    Exercise Frequency:infrequency  Types of Exercise: walking      Historical Information   Past Medical History:   Diagnosis Date    Asthma     Hypertension     Migraine     Miscarriage     Ventral hernia      Past Surgical History:   Procedure Laterality Date     SECTION  2018    CHOLECYSTECTOMY  2012    KNEE SURGERY       Social History   Social History     Substance and Sexual Activity   Alcohol Use No     Social History     Substance and Sexual Activity   Drug Use Yes    Types: Marijuana     Social History     Tobacco Use   Smoking Status Every Day    Current packs/day: 1.00    Average packs/day: 1 pack/day for 20.0 years (20.0 ttl pk-yrs)    Types: Cigarettes   Smokeless Tobacco Never   Tobacco Comments    Pt vapes on occasion, pt smoked hookah in the past      Family History: non-contributory    Meds/Allergies   all medications and allergies reviewed  No Known Allergies    Objective       Current Vitals:   /62 (BP Location: Left arm, Patient  "Position: Sitting, Cuff Size: Large)   Pulse 58   Resp 16   Ht 5' 3\" (1.6 m)   Wt 134 kg (296 lb 3.2 oz)   LMP 04/15/2024 (Exact Date) Comment: Pt reports menstural cycle is regularly irregular., may skip months  BMI 52.47 kg/m²       Physical Exam  Vitals reviewed.   Constitutional:       Appearance: She is well-developed.   HENT:      Head: Normocephalic.   Eyes:      Extraocular Movements: Extraocular movements intact.   Cardiovascular:      Rate and Rhythm: Normal rate.   Pulmonary:      Effort: Pulmonary effort is normal.   Abdominal:      General: There is no distension.   Musculoskeletal:         General: Normal range of motion.      Cervical back: Normal range of motion.   Neurological:      Mental Status: She is alert and oriented to person, place, and time.   Psychiatric:         Mood and Affect: Mood normal.         Behavior: Behavior normal.         Thought Content: Thought content normal.         Judgment: Judgment normal.         Lab Results: I have personally reviewed pertinent lab results.  , CBC, CMP, Vitamin D  Imaging: I have personally reviewed pertinent reports.   and recent CTAP        Assessment/PLAN:    33 y.o. yo female with a long standing h/o of obesity and inability to sustain any meaningful weight loss on her own despite several attempts.    She is interested in the RYGB but given her smoking status I told her I will only offer her the Laparoscopic sleeve gastrectomy.    I have discussed with the patient that a percentage of patient's may experience new or worsened GERD and 18% risk of Ernst's esophagitis requiring surveillance EGDs after a sleeve gastrectomy. The patient understands this fully and accepts the risks to undergo a sleeve gastrectomy.       I have explained our Enhanced Recovery After Bariatric Surgery (ERABS) protocol and benefits including preoperative, intraoperative and postoperative elements.     As a part of his pre op process, she will undergo evaluation " appointments with out dietician, licensed care , and . After the evaluation, she may be referred to a cardiologist     She needs an EGD to evaluate the anatomy of her GI tract prior to the operation.    I have spent a total time of 30 minutes on 05/24/24 in caring for this patient including Risks and benefits of tx options, Instructions for management, Patient and family education, Importance of tx compliance, Risk factor reductions, Impressions, Counseling / Coordination of care, Documenting in the medical record, Reviewing / ordering tests, medicine, procedures  , and Obtaining or reviewing history  .    She was given the opportunity to ask questions and I have answered all of them.  I have discussed and educated the patient with regards to the components of our multidisciplinary program and the importance of compliance and follow up in the post operative period. The patient was also instructed with regards to the importance of behavior modification, nutritional counseling, support meeting attendance and lifestyle changes that are important to ensure success.     Although there is a great statistical chance of improvement or even resolution of most of her associated comorbidities, the results vary from patient to patient and they largely depend on her commitment and compliance.       LEONILA Addison MD, FACS, [facility]  5/24/2024  10:27 AM

## 2024-05-24 NOTE — TELEPHONE ENCOUNTER
Patient returned call. Frustrated and cursing stating that we keep calling the wrong number. Her number is [phone]. Updated in chart. She wants a lipid panel ordered.

## 2024-05-28 ENCOUNTER — TELEPHONE (OUTPATIENT)
Dept: FAMILY MEDICINE CLINIC | Facility: CLINIC | Age: 34
End: 2024-05-28

## 2024-05-28 NOTE — TELEPHONE ENCOUNTER
Pt returned call to inform office that nothing has changed. Her insurance is the same and she will be coming to the office tomorrow for her appointment. Pt denies further needs or questions at this time.

## 2024-05-29 ENCOUNTER — HOSPITAL ENCOUNTER (EMERGENCY)
Facility: HOSPITAL | Age: 34
Discharge: HOME | End: 2024-05-29
Attending: EMERGENCY MEDICINE
Payer: COMMERCIAL

## 2024-05-29 ENCOUNTER — TELEPHONE (OUTPATIENT)
Dept: PSYCHIATRY | Facility: CLINIC | Age: 34
End: 2024-05-29

## 2024-05-29 ENCOUNTER — OFFICE VISIT (OUTPATIENT)
Dept: FAMILY MEDICINE CLINIC | Facility: CLINIC | Age: 34
End: 2024-05-29
Payer: COMMERCIAL

## 2024-05-29 VITALS
DIASTOLIC BLOOD PRESSURE: 84 MMHG | SYSTOLIC BLOOD PRESSURE: 138 MMHG | BODY MASS INDEX: 51.37 KG/M2 | OXYGEN SATURATION: 85 % | WEIGHT: 290 LBS | TEMPERATURE: 98.3 F | HEART RATE: 58 BPM

## 2024-05-29 VITALS
TEMPERATURE: 97.8 F | HEART RATE: 68 BPM | OXYGEN SATURATION: 98 % | RESPIRATION RATE: 18 BRPM | SYSTOLIC BLOOD PRESSURE: 191 MMHG | DIASTOLIC BLOOD PRESSURE: 90 MMHG

## 2024-05-29 DIAGNOSIS — F41.0 PANIC ATTACK AS REACTION TO STRESS: Primary | ICD-10-CM

## 2024-05-29 DIAGNOSIS — F17.210 CIGARETTE NICOTINE DEPENDENCE WITHOUT COMPLICATION: Primary | ICD-10-CM

## 2024-05-29 DIAGNOSIS — F41.9 ANXIETY: ICD-10-CM

## 2024-05-29 DIAGNOSIS — F43.0 PANIC ATTACK AS REACTION TO STRESS: Primary | ICD-10-CM

## 2024-05-29 PROCEDURE — 99283 EMERGENCY DEPT VISIT LOW MDM: CPT

## 2024-05-29 PROCEDURE — 99285 EMERGENCY DEPT VISIT HI MDM: CPT | Performed by: EMERGENCY MEDICINE

## 2024-05-29 PROCEDURE — 99214 OFFICE O/P EST MOD 30 MIN: CPT | Performed by: NURSE PRACTITIONER

## 2024-05-29 RX ORDER — HYDROXYZINE HYDROCHLORIDE 25 MG/1
25 TABLET, FILM COATED ORAL EVERY 6 HOURS
Qty: 12 TABLET | Refills: 0 | Status: SHIPPED | OUTPATIENT
Start: 2024-05-29

## 2024-05-29 RX ORDER — VARENICLINE TARTRATE 0.5 (11)-1
KIT ORAL
Qty: 53 EACH | Refills: 0 | Status: SHIPPED | OUTPATIENT
Start: 2024-05-29

## 2024-05-29 NOTE — ED PROVIDER NOTES
"History  Chief Complaint   Patient presents with    Panic Attack     Pt reports that she has been experiencing anxiety attacks - was here on May 23rd for the same thing. States that she is not taking any medications. Therapist sent her to er because they couldn't see her right away. denies SI/HI     The patient is a 33-year-old female with PMH of asthma, HTN, obesity and migraines presenting with \"panic attacks\". Her symptoms started recently and are manifest as chest pain, dyspnea, palpitations and lightheadedness.  Patient is scheduled to see a therapist at [facility] tomorrow and a psychiatrist at [facility] next week.  She was told they would not be able to prescribe medications until she was seen.  She was evaluated for chest pain here on 24 and discharged on sucralfate and Pepcid.   Patient states she only has chest pain when she is anxious.  These episodes last to approximate 30 minutes.  It was worse last night but it is resolved currently.  Patient is here solely to request medication to use on an as needed basis for \"panic attacks\".  She did speak to her therapy team and to the family doctor about this today.  PDMP was reviewed.  Recent ED chart and EKG reviewed.        Prior to Admission Medications   Prescriptions Last Dose Informant Patient Reported? Taking?   Pancrelipase, Lip-Prot-Amyl, (Creon) 02147-933573 units CPEP   No No   Sig: Take 1 capsule (36,000 Units total) by mouth 3 (three) times a day with meals   Varenicline Tartrate, Starter, (Chantix Starting Month ) 0.5 MG X 11 & 1 MG X 42 TBPK   No No   Si.5 mg once daily for 3 days then 0.5mg twice daily for days 4-7 then 1 mg twice daily   albuterol (PROVENTIL HFA,VENTOLIN HFA) 90 mcg/act inhaler  Self Yes No   Sig: Inhale 1 puff every 6 (six) hours as needed   atorvastatin (LIPITOR) 10 mg tablet   No No   Sig: TAKE 1 TABLET BY MOUTH EVERY DAY   famotidine (PEPCID) 20 mg tablet   No No   Sig: Take 1 tablet (20 mg total) by " mouth 2 (two) times a day for 14 days   lisinopril (ZESTRIL) 40 mg tablet   No No   Sig: TAKE 1 TABLET BY MOUTH EVERY DAY   meloxicam (Mobic) 15 mg tablet   No No   Sig: Take 1 tablet (15 mg total) by mouth daily   nortriptyline (PAMELOR) 10 mg capsule   No No   Sig: Take 1 capsule (10 mg total) by mouth daily at bedtime   ondansetron (ZOFRAN) 4 mg tablet   No No   Sig: Take 1 tablet (4 mg total) by mouth every 8 (eight) hours as needed for nausea or vomiting   predniSONE 20 mg tablet   No No   Sig: Take 1 tablet (20 mg total) by mouth daily   Patient not taking: Reported on 2024   propranolol (INDERAL) 40 mg tablet   Yes No   Sig: Take 40 mg by mouth 2 (two) times a day   sucralfate (CARAFATE) 1 g tablet   No No   Sig: Take 1 tablet (1 g total) by mouth 4 (four) times a day for 5 days   tiZANidine (ZANAFLEX) 2 mg tablet   No No   Sig: Take 1 tablet (2 mg total) by mouth every 8 (eight) hours as needed for muscle spasms      Facility-Administered Medications: None       Past Medical History:   Diagnosis Date    Asthma     Hypertension     Migraine     Miscarriage     Ventral hernia        Past Surgical History:   Procedure Laterality Date     SECTION  2018    CHOLECYSTECTOMY  2012    KNEE SURGERY         Family History   Problem Relation Age of Onset    Hypertension Mother     Asthma Mother     Migraines Mother     Seizures Mother     ADD / ADHD Father     Breast cancer Sister     Colon cancer Neg Hx     Colon polyps Neg Hx      I have reviewed and agree with the history as documented.    E-Cigarette/Vaping    E-Cigarette Use Current Every Day User      E-Cigarette/Vaping Substances    Nicotine Yes     THC No     CBD No     Flavoring No     Other No     Unknown No      Social History     Tobacco Use    Smoking status: Every Day     Current packs/day: 1.00     Average packs/day: 1 pack/day for 20.0 years (20.0 ttl pk-yrs)     Types: Cigarettes    Smokeless tobacco: Never    Tobacco comments:     Pt  vapes on occasion, pt smoked hookah in the past    Vaping Use    Vaping status: Every Day    Substances: Nicotine   Substance Use Topics    Alcohol use: No    Drug use: Yes     Types: Marijuana       Review of Systems   Constitutional:  Negative for fever.   HENT:  Negative for congestion.    Respiratory:  Negative for cough.    Cardiovascular:  Positive for chest pain and palpitations. Negative for leg swelling.   Gastrointestinal:  Negative for blood in stool, diarrhea and vomiting.   Musculoskeletal:  Negative for myalgias.   Neurological:  Negative for dizziness and syncope.   Psychiatric/Behavioral:  Negative for self-injury and suicidal ideas. The patient is nervous/anxious.        Physical Exam  Physical Exam  Vitals and nursing note reviewed.   Constitutional:       General: She is not in acute distress.     Appearance: She is well-developed. She is obese. She is not ill-appearing or diaphoretic.   HENT:      Head: Normocephalic and atraumatic.      Right Ear: External ear normal.      Left Ear: External ear normal.   Eyes:      General: No scleral icterus.     Conjunctiva/sclera: Conjunctivae normal.      Pupils: Pupils are equal, round, and reactive to light.   Neck:      Vascular: No carotid bruit.   Cardiovascular:      Rate and Rhythm: Normal rate and regular rhythm.      Pulses: Normal pulses.      Heart sounds: Normal heart sounds. No murmur heard.  Pulmonary:      Effort: Pulmonary effort is normal.      Breath sounds: Normal breath sounds.   Abdominal:      General: Bowel sounds are normal.      Palpations: Abdomen is soft.      Tenderness: There is no abdominal tenderness.   Musculoskeletal:         General: No tenderness. Normal range of motion.      Cervical back: Normal range of motion and neck supple.      Right lower leg: No edema.      Left lower leg: No edema.   Skin:     General: Skin is warm and dry.      Capillary Refill: Capillary refill takes less than 2 seconds.      Findings: No  lesion or rash.   Neurological:      General: No focal deficit present.      Mental Status: She is alert and oriented to person, place, and time. Mental status is at baseline.      Cranial Nerves: No cranial nerve deficit.      Sensory: No sensory deficit.      Motor: No weakness.      Coordination: Coordination normal.      Gait: Gait normal.      Deep Tendon Reflexes: Reflexes are normal and symmetric.   Psychiatric:         Mood and Affect: Mood normal.         Behavior: Behavior normal.         Vital Signs  ED Triage Vitals   Temperature Pulse Respirations Blood Pressure SpO2   05/29/24 1708 05/29/24 1707 05/29/24 1707 05/29/24 1708 05/29/24 1707   97.8 °F (36.6 °C) 68 18 (!) 191/90 98 %      Temp Source Heart Rate Source Patient Position - Orthostatic VS BP Location FiO2 (%)   05/29/24 1708 05/29/24 1707 -- -- --   Temporal Monitor         Pain Score       05/29/24 1729       No Pain           Vitals:    05/29/24 1707 05/29/24 1708   BP:  (!) 191/90   Pulse: 68          Visual Acuity      ED Medications  Medications - No data to display    Diagnostic Studies  Results Reviewed       None                   No orders to display              Procedures  Procedures         ED Course                               SBIRT 20yo+      Flowsheet Row Most Recent Value   Initial Alcohol Screen: US AUDIT-C     1. How often do you have a drink containing alcohol? 0 Filed at: 05/29/2024 1707   2. How many drinks containing alcohol do you have on a typical day you are drinking?  0 Filed at: 05/29/2024 1707   3a. Male UNDER 65: How often do you have five or more drinks on one occasion? 0 Filed at: 05/29/2024 1707   3b. FEMALE Any Age, or MALE 65+: How often do you have 4 or more drinks on one occassion? 0 Filed at: 05/29/2024 1707   Audit-C Score 0 Filed at: 05/29/2024 1707   TRINITY: How many times in the past year have you...    Used an illegal drug or used a prescription medication for non-medical reasons? Never Filed at:  "05/29/2024 1707                      Medical Decision Making  33-year-old female requesting medication to use as needed basis for anxiety and panic attacks.  Had medical workup for chest pain and other signs and symptoms relating to her \"panic attacks.  Workup was essentially negative.  Had some nonspecific ST segments on EKG but no signs of ischemia.  Patient has no chest pain currently.  She did follow-up with PCP and therapist.  She has appointment with therapist tomorrow and with a psychiatrist in about 1 week.  No SI or HI.  Denies being depressed currently but does admit to being under a lot of stresses.  Will trial a small prescription for hydroxyzine.    Amount and/or Complexity of Data Reviewed  External Data Reviewed: labs, ECG and notes.    Risk  Prescription drug management.             Disposition  Final diagnoses:   Panic attack as reaction to stress     Time reflects when diagnosis was documented in both MDM as applicable and the Disposition within this note       Time User Action Codes Description Comment    5/29/2024  5:55 PM Dimitrios Lynch Add [F41.0,  F43.0] Panic attack as reaction to stress           ED Disposition       ED Disposition   Discharge    Condition   Stable    Date/Time   Wed May 29, 2024 5183    Comment   Jennifer Johnson discharge to home/self care.                   Follow-up Information       Follow up With Specialties Details Why Contact Info    [facility]   [address] [place] [address] 2  [facility] [id number] [phone]              Discharge Medication List as of 5/29/2024  6:00 PM        START taking these medications    Details   hydrOXYzine HCL (ATARAX) 25 mg tablet Take 1 tablet (25 mg total) by mouth every 6 (six) hours, Starting Wed 5/29/2024, Normal           CONTINUE these medications which have NOT CHANGED    Details   albuterol (PROVENTIL HFA,VENTOLIN HFA) 90 mcg/act inhaler Inhale 1 puff every 6 (six) hours as needed, Historical Med    "   atorvastatin (LIPITOR) 10 mg tablet TAKE 1 TABLET BY MOUTH EVERY DAY, Starting Tue 2/13/2024, Normal      famotidine (PEPCID) 20 mg tablet Take 1 tablet (20 mg total) by mouth 2 (two) times a day for 14 days, Starting Thu 5/23/2024, Until Thu 6/6/2024, Normal      lisinopril (ZESTRIL) 40 mg tablet TAKE 1 TABLET BY MOUTH EVERY DAY, Starting Fri 1/26/2024, Normal      meloxicam (Mobic) 15 mg tablet Take 1 tablet (15 mg total) by mouth daily, Starting Wed 5/22/2024, Normal      nortriptyline (PAMELOR) 10 mg capsule Take 1 capsule (10 mg total) by mouth daily at bedtime, Starting Wed 5/15/2024, Normal      ondansetron (ZOFRAN) 4 mg tablet Take 1 tablet (4 mg total) by mouth every 8 (eight) hours as needed for nausea or vomiting, Starting Tue 3/26/2024, Normal      Pancrelipase, Lip-Prot-Amyl, (Creon) 04032-207308 units CPEP Take 1 capsule (36,000 Units total) by mouth 3 (three) times a day with meals, Starting Mon 5/13/2024, Normal      predniSONE 20 mg tablet Take 1 tablet (20 mg total) by mouth daily, Starting Wed 5/1/2024, Normal      propranolol (INDERAL) 40 mg tablet Take 40 mg by mouth 2 (two) times a day, Historical Med      sucralfate (CARAFATE) 1 g tablet Take 1 tablet (1 g total) by mouth 4 (four) times a day for 5 days, Starting Thu 5/23/2024, Until Tue 5/28/2024, Normal      tiZANidine (ZANAFLEX) 2 mg tablet Take 1 tablet (2 mg total) by mouth every 8 (eight) hours as needed for muscle spasms, Starting Wed 5/15/2024, Normal      Varenicline Tartrate, Starter, (Chantix Starting Month Pak) 0.5 MG X 11 & 1 MG X 42 TBPK 0.5 mg once daily for 3 days then 0.5mg twice daily for days 4-7 then 1 mg twice daily, Normal             No discharge procedures on file.    PDMP Review         Value Time User    PDMP Reviewed  Yes 5/29/2024  5:51 PM Dimitrios Lynch DO            ED Provider  Electronically Signed by             Dimitrios Lynch,   05/29/24 1806       Dimitrios Lynch DO  05/29/24 1807

## 2024-05-29 NOTE — PATIENT INSTRUCTIONS
Start Varenicline Tartrate as prescribed to stop smoking  Have labs completed as previously ordered

## 2024-05-29 NOTE — PROGRESS NOTES
Ambulatory Visit  Name: Jennifer Johnson      : 1990      MRN: [id number]  Encounter Provider: [facility]  Encounter Date: 2024   Encounter department: [facility]    Assessment & Plan   1. Cigarette nicotine dependence without complication  -     Varenicline Tartrate, Starter, (Chantix Starting Month ) 0.5 MG X 11 & 1 MG X 42 TBPK; 0.5 mg once daily for 3 days then 0.5mg twice daily for days 4-7 then 1 mg twice daily    2. Anxiety  -Schedule therapy. Find ways to reduce stress/anxiety-healthy diet, exercise, adequate sleep       History of Present Illness     Patient is here for smoking cessation. She used Chantix in the past and was able to stop smoking. She is starting the process for weight loss surgery and wants to quit smoking.   F/up ED-patient was seen for chest pain/Gerd-EKG and Troponin normal. She c/o anxiety due to increased stressors at home which may have contributed to chest pain. She was given medication for GERD from the ED. She states she will be starting therapy soon.      Review of Systems   Constitutional:  Negative for activity change and fever.   Respiratory:  Negative for cough, chest tightness and wheezing.    Cardiovascular:  Negative for chest pain.   Neurological:  Negative for dizziness, weakness and headaches.   Psychiatric/Behavioral:  Negative for dysphoric mood, self-injury, sleep disturbance and suicidal ideas. The patient is nervous/anxious.      Past Medical History:   Diagnosis Date    Asthma     Hypertension     Migraine     Miscarriage     Ventral hernia      Past Surgical History:   Procedure Laterality Date     SECTION  2018    CHOLECYSTECTOMY  2012    KNEE SURGERY       Family History   Problem Relation Age of Onset    Hypertension Mother     Asthma Mother     Migraines Mother     Seizures Mother     ADD / ADHD Father     Breast cancer Sister     Colon cancer Neg Hx     Colon polyps Neg Hx      Social  History     Tobacco Use    Smoking status: Every Day     Current packs/day: 1.00     Average packs/day: 1 pack/day for 20.0 years (20.0 ttl pk-yrs)     Types: Cigarettes    Smokeless tobacco: Never    Tobacco comments:     Pt vapes on occasion, pt smoked hookah in the past    Vaping Use    Vaping status: Every Day    Substances: Nicotine   Substance and Sexual Activity    Alcohol use: No    Drug use: Yes     Types: Marijuana    Sexual activity: Not Currently     Partners: Male     Current Outpatient Medications on File Prior to Visit   Medication Sig    albuterol (PROVENTIL HFA,VENTOLIN HFA) 90 mcg/act inhaler Inhale 1 puff every 6 (six) hours as needed    atorvastatin (LIPITOR) 10 mg tablet TAKE 1 TABLET BY MOUTH EVERY DAY    famotidine (PEPCID) 20 mg tablet Take 1 tablet (20 mg total) by mouth 2 (two) times a day for 14 days    lisinopril (ZESTRIL) 40 mg tablet TAKE 1 TABLET BY MOUTH EVERY DAY    meloxicam (Mobic) 15 mg tablet Take 1 tablet (15 mg total) by mouth daily    nortriptyline (PAMELOR) 10 mg capsule Take 1 capsule (10 mg total) by mouth daily at bedtime    ondansetron (ZOFRAN) 4 mg tablet Take 1 tablet (4 mg total) by mouth every 8 (eight) hours as needed for nausea or vomiting    Pancrelipase, Lip-Prot-Amyl, (Creon) [id number] units CPEP Take 1 capsule (36,000 Units total) by mouth 3 (three) times a day with meals    propranolol (INDERAL) 40 mg tablet Take 40 mg by mouth 2 (two) times a day    tiZANidine (ZANAFLEX) 2 mg tablet Take 1 tablet (2 mg total) by mouth every 8 (eight) hours as needed for muscle spasms    predniSONE 20 mg tablet Take 1 tablet (20 mg total) by mouth daily (Patient not taking: Reported on 5/29/2024)    sucralfate (CARAFATE) 1 g tablet Take 1 tablet (1 g total) by mouth 4 (four) times a day for 5 days    [DISCONTINUED] metroNIDAZOLE (METROGEL) 0.75 % vaginal gel Insert 1 Application into the vagina daily at bedtime 1 applicator full (approx 5 grams) vaginally x 5 days.     No  Known Allergies  Immunization History   Administered Date(s) Administered    COVID-19 MODERNA VACC 0.5 ML IM 03/09/2021, 04/06/2021    COVID-19 Pfizer mRNA vacc PF dirk-sucrose 12 yr and older (Comirnaty) 11/20/2023     Objective     /84 (BP Location: Left arm, Patient Position: Sitting, Cuff Size: Standard)   Pulse 58   Temp 98.3 °F (36.8 °C) (Tympanic)   Wt 132 kg (290 lb)   SpO2 (!) 85%   BMI 51.37 kg/m²     Physical Exam  Vitals and nursing note reviewed.   Constitutional:       General: She is not in acute distress.     Appearance: Normal appearance. She is obese. She is not ill-appearing.   HENT:      Head: Normocephalic.   Eyes:      Extraocular Movements: Extraocular movements intact.   Cardiovascular:      Rate and Rhythm: Normal rate and regular rhythm.      Heart sounds: Normal heart sounds.   Pulmonary:      Effort: Pulmonary effort is normal. No respiratory distress.      Breath sounds: Normal breath sounds.   Skin:     General: Skin is warm and dry.   Neurological:      Mental Status: She is alert and oriented to person, place, and time.   Psychiatric:         Mood and Affect: Mood normal.         Behavior: Behavior normal.       PHQ-2/9 Depression Screening    Little interest or pleasure in doing things: 0 - not at all  Feeling down, depressed, or hopeless: 0 - not at all  PHQ-2 Score: 0  PHQ-2 Interpretation: Negative depression screen         Administrative Statements

## 2024-05-29 NOTE — TELEPHONE ENCOUNTER
Patient called to schedule med mgmt services. Patient stated she was having really bad anxiety attacks and needed to see someone soon. Writer informed patient that we are not able to accommodate an immediate need. Writer suggested going to the ED for immediate services. Writer is also reaching out to Maria Ines to give patient a call.

## 2024-05-29 NOTE — DISCHARGE INSTRUCTIONS
It appears that your symptoms may be due to anxiety and panic attacks but this diagnosis is not established at this time.    Please follow-up with your therapist and psychiatrist as scheduled.      Continue your present medications.      Take hydroxyzine, 1 to 2 tablets only if needed for panic attack but not more frequently than every 6 hours.

## 2024-05-30 ENCOUNTER — TELEPHONE (OUTPATIENT)
Dept: PSYCHIATRY | Facility: CLINIC | Age: 34
End: 2024-05-30

## 2024-05-30 PROBLEM — G43.909 MIGRAINE WITHOUT STATUS MIGRAINOSUS, NOT INTRACTABLE: Status: ACTIVE | Noted: 2023-08-01

## 2024-05-30 NOTE — TELEPHONE ENCOUNTER
"Behavioral Health Outpatient Intake Questions    Referred By   : SELF    Please advise interviewee that they need to answer all questions truthfully to allow for best care, and any misrepresentations of information may affect their ability to be seen at this clinic   => Was this discussed? Yes     If Minor Child (under age 18)    Who is/are the legal guardian(s) of the child?     Is there a custody agreement? No     If \"YES\"- Custody orders must be obtained prior to scheduling the first appointment  In addition, Consent to Treatment must be signed by all legal guardians prior to scheduling the first appointment    If \"NO\"- Consent to Treatment must be signed by all legal guardians prior to scheduling the first appointment    Behavioral Health Outpatient Intake History -     Presenting Problem (in patient's own words): anxiety and panic attacks    Are there any communication barriers for this patient?     No                                               If yes, please describe barriers:   If there is a unique situation, please refer to Javier Leroy/Tamica Conklin for final determination.    Are you taking any psychiatric medications? Yes     If \"YES\" -What are they Hydroxyzine HCL 25 mg    If \"YES\" -Who prescribes?   [facility]    Has the Patient previously received outpatient Talk Therapy or Medication Management from [facility]  No        If \"YES\"- When, Where and with Whom?         If \"NO\" -Has Patient received these services elsewhere?       If \"YES\" -When, Where, and with Whom?    Has the Patient abused alcohol or other substances in the last 6 months ? No       If \"YES\" -What substance, How much, How often?     If illegal substance: Refer to [facility] (for LEYLA) or [facility].   If Alcohol in excess of 10 drinks per week:  Refer to [facility] (for LEY[facility]) or [facility]    Legal History-     Is this treatment court ordered? No   If \"yes \"send to :  Talk Therapy : Send to Javier Diaz" "Rubin for final determination   Med Management: Send to Dr Camarena for final determination     Has the Patient been convicted of a felony?  No   If \"Yes\" send to -When, What?  Talk Therapy : Send to Javier Leroy/Tamica Conklin for final determination   Med Management: Send to Dr Camarena for final determination     ACCEPTED as a patient Yes  If \"Yes\" Appointment Date: 5/31/24    Referred Elsewhere? No  If “Yes” - (Where? Ex: [facility], [facility], [facility], [facility], etc.)     Name of Insurance Co: [employer]/ ELVIA rojas  Insurance ID#  [id number]  Insurance Phone #  If ins is primary or secondary?  If patient is a minor, parents information such as Name, D.O.B of guarantor.  "

## 2024-05-30 NOTE — BH TREATMENT PLAN
TREATMENT PLAN (Medication Management Only)        [facility]    Name and Date of Birth:  Jennifer White y.o. 1990  Date of Treatment Plan: May 31, 2024  Diagnosis/Diagnoses: Anxiety with panic attacks  Strengths/Personal Resources for Self-Care: taking medications as prescribed, ability to adapt to life changes, ability to communicate needs, ability to communicate well, ability to listen, ability to reason, ability to understand psychiatric illness.  Area/Areas of need (in own words): anxiety symptoms  1. Long Term Goal: improve control of anxiety.  Target Date: 1131 2024  Person/Persons responsible for completion of goal: Jennifer  2.  Short Term Objective (s) - How will we reach this goal?:   A. Provider new recommended medication/dosage changes and/or continue medication(s): continue current medications as prescribed.  B. Keep regularly scheduled psychiatric appointments  C. Maintain adherence to psychotropic medication regimen   D. Exercise daily (at least 30 mins)  E. Maintain appropriate dietary intake  F. Practice adequate sleep hygiene    Target Date: 1131 2024  Person/Persons Responsible for Completion of Goal: Jennifer  Progress Towards Goals: continuing treatment  Treatment Modality: medication management every 4 weeks  Review due 180 days from date of this plan:  1131 2024  Expected length of service: ongoing treatment  My Physician/PA/NP and I have developed this plan together and I agree to work on the goals and objectives. I understand the treatment goals that were developed for my treatment.
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
## 2024-05-31 ENCOUNTER — OFFICE VISIT (OUTPATIENT)
Dept: PSYCHIATRY | Facility: CLINIC | Age: 34
End: 2024-05-31
Payer: COMMERCIAL

## 2024-05-31 DIAGNOSIS — F41.9 ANXIETY: Primary | ICD-10-CM

## 2024-05-31 DIAGNOSIS — F43.10 POST TRAUMATIC STRESS DISORDER (PTSD): ICD-10-CM

## 2024-05-31 PROBLEM — F31.9 BIPOLAR DISORDER WITH DEPRESSION (HCC): Status: RESOLVED | Noted: 2023-08-01 | Resolved: 2024-05-31

## 2024-05-31 PROCEDURE — 90792 PSYCH DIAG EVAL W/MED SRVCS: CPT

## 2024-05-31 RX ORDER — BUSPIRONE HYDROCHLORIDE 10 MG/1
TABLET ORAL
Qty: 60 TABLET | Refills: 0 | Status: SHIPPED | OUTPATIENT
Start: 2024-05-31

## 2024-05-31 NOTE — BH CRISIS PLAN
Client Name: Jennifer Johnson       Client YOB: 1990    [facility] Safety Plan         Step 1: Warning Signs:            Step 2: Internal Coping Strategies:            Step 3: People and social settings that provide distraction:            Step 4: People whom I can ask for help during a crisis:      Step 5: Professionals or agencies I can contact during a crisis:        Crisis Phone Numbers:   Suicide Prevention Lifeline: Call or Text  016 Crisis Text Line: Text HOME to 997-887   Please note: Some [facility] do not have a separate number for Child/Adolescent specific crisis. If your county is not listed under Child/Adolescent, please call the adult number for your county      Adult Crisis Numbers: Child/Adolescent Crisis Numbers   [facility]: [phone] [facility]: [phone]   [facility]: [phone] [facility]: [phone]   [facility]: [phone] [place]: [phone]   [facility]: [phone] [facility]: [phone]   [facility]: [phone]   [facility]: [phone]   [facility]: [phone]   [facility]: [phone] (daytime) [phone] (after hours, weekends, holidays)      Step 6: Making the environment safer (plan for lethal means safety):      Optional: What is most important to me and worth living for?      [place] Safety Plan. Valeria Khanna and Jose Miguel Mckay. Used with permission of the authors.

## 2024-06-04 ENCOUNTER — OFFICE VISIT (OUTPATIENT)
Dept: OBGYN CLINIC | Facility: CLINIC | Age: 34
End: 2024-06-04
Payer: COMMERCIAL

## 2024-06-04 ENCOUNTER — TELEPHONE (OUTPATIENT)
Age: 34
End: 2024-06-04

## 2024-06-04 VITALS
SYSTOLIC BLOOD PRESSURE: 136 MMHG | HEIGHT: 62 IN | DIASTOLIC BLOOD PRESSURE: 80 MMHG | WEIGHT: 293 LBS | BODY MASS INDEX: 53.92 KG/M2

## 2024-06-04 DIAGNOSIS — R23.2 HOT FLASHES: Primary | ICD-10-CM

## 2024-06-04 PROCEDURE — 99213 OFFICE O/P EST LOW 20 MIN: CPT | Performed by: OBSTETRICS & GYNECOLOGY

## 2024-06-04 RX ORDER — SUMATRIPTAN 50 MG/1
TABLET, FILM COATED ORAL
COMMUNITY
Start: 2024-06-03

## 2024-06-04 NOTE — PROGRESS NOTES
[facility] - [place]  [address] [place] [address]    Assessment/Plan:  1. Hot flashes  Assessment & Plan:  Reviewed could be caused by multiple etiologies. Will check bloodwork to rule out hormonal causes. If  normal can try cooling sheets, fan at night, low temp when sleeping, weight loss. Reports eating a healthy diet already.   Orders:  -     TSH, 3rd generation with Free T4 reflex; Future  -     Estradiol; Future  -     Follicle stimulating hormone; Future  -     Luteinizing hormone; Future  -     Testosterone, free, total; Future      Subjective:   Jennifer Johnson is a 33 y.o. No obstetric history on file. .  CC:   Chief Complaint   Patient presents with    Consult     Having on / off hot flashes symptoms for awhile       HPI: 32yo female presents with complaints of hot flashes for the last couple of months-not timeable. She reports also night sweats, every night. Waking her up from her sleep. She reports a monthly cycle. She reports her periods are 4-8 days. She has not tried any medications. She reports no changes to her medications. She reports it had improved a couple months ago but then restarted.     ROS: Negative except as noted in HPI    Patient's last menstrual period was 2024 (exact date).       She  reports that she is not currently sexually active and has had partner(s) who are male.       The following portions of the patient's history were reviewed and updated as appropriate:   Past Medical History:   Diagnosis Date    Asthma     Hypertension     Migraine     Miscarriage     Ventral hernia      Past Surgical History:   Procedure Laterality Date     SECTION  2018    CHOLECYSTECTOMY  2012    KNEE SURGERY       Family History   Problem Relation Age of Onset    Hypertension Mother     Asthma Mother     Migraines Mother     Seizures Mother     ADD / ADHD Father     Breast cancer Sister     Colon cancer Neg Hx     Colon polyps Neg Hx      Social History     Socioeconomic  "History    Marital status: Single     Spouse name: None    Number of children: None    Years of education: None    Highest education level: None   Occupational History    None   Tobacco Use    Smoking status: Every Day     Current packs/day: 1.00     Average packs/day: 1 pack/day for 20.0 years (20.0 ttl pk-yrs)     Types: Cigarettes    Smokeless tobacco: Never    Tobacco comments:     Pt vapes on occasion, pt smoked hookah in the past    Vaping Use    Vaping status: Every Day    Substances: Nicotine   Substance and Sexual Activity    Alcohol use: No    Drug use: Not Currently     Types: Marijuana    Sexual activity: Not Currently     Partners: Male   Other Topics Concern    None   Social History Narrative    None     Social Determinants of Health     Financial Resource Strain: Not on file   Food Insecurity: Not on file   Transportation Needs: Not on file   Physical Activity: Not on file   Stress: Not on file   Social Connections: Not on file   Intimate Partner Violence: Not on file   Housing Stability: Not on file     Outpatient Medications Marked as Taking for the 6/4/24 encounter (Office Visit) with Chioma DIAS DO   Medication    atorvastatin (LIPITOR) 10 mg tablet    busPIRone (BUSPAR) 10 mg tablet    famotidine (PEPCID) 20 mg tablet    lisinopril (ZESTRIL) 40 mg tablet    meloxicam (Mobic) 15 mg tablet    nortriptyline (PAMELOR) 10 mg capsule    ondansetron (ZOFRAN) 4 mg tablet    Pancrelipase, Lip-Prot-Amyl, (Creon) 39698-462918 units CPEP    propranolol (INDERAL) 40 mg tablet    SUMAtriptan (IMITREX) 50 mg tablet    tiZANidine (ZANAFLEX) 2 mg tablet    Varenicline Tartrate, Starter, (Chantix Starting Month Pak) 0.5 MG X 11 & 1 MG X 42 TBPK     No Known Allergies        Objective:  /80 (BP Location: Left arm, Patient Position: Sitting, Cuff Size: Large)   Ht 5' 1.75\" (1.568 m)   Wt 134 kg (294 lb 12.8 oz)   LMP 05/17/2024 (Exact Date)   BMI 54.36 kg/m²          General Appearance: alert and " oriented, in no acute distress.   Extremities: Normal range of motion.   Neurologic: alert, oriented x3  Psychiatric: Appropriate affect, mood stable, cooperative with exam.        Chioma Tapia DO  6/4/2024 8:56 AM
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
## 2024-06-04 NOTE — ASSESSMENT & PLAN NOTE
Reviewed could be caused by multiple etiologies. Will check bloodwork to rule out hormonal causes. If  normal can try cooling sheets, fan at night, low temp when sleeping, weight loss. Reports eating a healthy diet already.

## 2024-06-05 ENCOUNTER — TELEPHONE (OUTPATIENT)
Age: 34
End: 2024-06-05

## 2024-06-05 ENCOUNTER — APPOINTMENT (OUTPATIENT)
Dept: LAB | Facility: HOSPITAL | Age: 34
End: 2024-06-05
Payer: COMMERCIAL

## 2024-06-05 DIAGNOSIS — R63.4 WEIGHT LOSS: Primary | ICD-10-CM

## 2024-06-05 DIAGNOSIS — R23.2 HOT FLASHES: ICD-10-CM

## 2024-06-05 DIAGNOSIS — E66.01 OBESITY, MORBID, BMI 50 OR HIGHER (HCC): Primary | ICD-10-CM

## 2024-06-05 LAB
B-HCG SERPL-ACNC: <0.6 MIU/ML (ref 0–5)
ESTRADIOL SERPL-MCNC: 70.9 PG/ML
FSH SERPL-ACNC: 6.5 MIU/ML
LH SERPL-ACNC: 5.8 MIU/ML
TSH SERPL DL<=0.05 MIU/L-ACNC: 0.84 UIU/ML (ref 0.45–4.5)

## 2024-06-05 PROCEDURE — 82670 ASSAY OF TOTAL ESTRADIOL: CPT

## 2024-06-05 PROCEDURE — 36415 COLL VENOUS BLD VENIPUNCTURE: CPT

## 2024-06-05 PROCEDURE — 83002 ASSAY OF GONADOTROPIN (LH): CPT

## 2024-06-05 PROCEDURE — 84443 ASSAY THYROID STIM HORMONE: CPT

## 2024-06-05 PROCEDURE — 83001 ASSAY OF GONADOTROPIN (FSH): CPT

## 2024-06-05 PROCEDURE — 84702 CHORIONIC GONADOTROPIN TEST: CPT

## 2024-06-05 PROCEDURE — 84403 ASSAY OF TOTAL TESTOSTERONE: CPT

## 2024-06-05 PROCEDURE — 84402 ASSAY OF FREE TESTOSTERONE: CPT

## 2024-06-05 NOTE — TELEPHONE ENCOUNTER
Pt needs a letter from Yenifer COLIN stating that she was the one who referred her to weight loss management.     Please f/u with pt when this is done

## 2024-06-05 NOTE — TELEPHONE ENCOUNTER
Patient called regarding her appointment for today 6/05/2024 at 1:00 pm. Advise pt her appointment was with [facility]. Warm transferred to [facility] was done.

## 2024-06-06 ENCOUNTER — TELEPHONE (OUTPATIENT)
Dept: FAMILY MEDICINE CLINIC | Facility: CLINIC | Age: 34
End: 2024-06-06

## 2024-06-06 LAB
TESTOST FREE SERPL-MCNC: 3.8 PG/ML (ref 0–4.2)
TESTOST SERPL-MCNC: 49 NG/DL (ref 8–60)

## 2024-06-06 NOTE — TELEPHONE ENCOUNTER
----- Message from DIXIE Loza sent at 6/5/2024  5:03 PM EDT -----  Please notify patient:    Lipid panel looked good. Cholesterol and LDL well managed with atorvastatin. HDL low.  Recommend healthy diet: lean proteins, veggies, fruits, whole grains, and legumes. Increase physical activity as tolerated and maintain a healthy body weight.  ----- Message -----  From: Lab, Background User  Sent: 6/5/2024   4:37 PM EDT  To: DIXIE Lozada

## 2024-06-16 ENCOUNTER — PATIENT MESSAGE (OUTPATIENT)
Dept: OBGYN CLINIC | Facility: CLINIC | Age: 34
End: 2024-06-16

## 2024-06-17 ENCOUNTER — TELEPHONE (OUTPATIENT)
Age: 34
End: 2024-06-17

## 2024-06-17 NOTE — TELEPHONE ENCOUNTER
Patient called office stating she has fever and is 5 weeks pregnant. She states fever is 99.9 . I advised fever is typically considered 100.4 or greater.  Patient would like to know if she should treat a fever of 99.9, she has no other complaints at this time. I advised I would reach out to provider, but this is low grade fever at this time. Also advised I will send a message with approved medications to take in pregnancy.

## 2024-06-17 NOTE — TELEPHONE ENCOUNTER
Called patient, advised per Dr. Milton if chills, body aches, sensation of fever she can treat with OTC acetaminophen. otherwise, treatment is not absolutely necessary. Patient denies any other symptoms at this time. Patient denies abdominal pain or bleeding at this time. Advised if she does begin to feel abdominal pain or bleeding, she should be evaluated in ED. She verbalizes understanding.   Informed patient per Dr. Milton she should be seen in office this week to discuss medications she is on for safety in pregnancy. Patient agreeable and office visit scheduled at this time.   She offers no further questions or concerns at this time.

## 2024-06-17 NOTE — TELEPHONE ENCOUNTER
"If patient has subjective sensation of fever (chills, body aches), she can treat with OTC acetaminophen. Otherwise, treatment is not absolutely necessary.     However, please assess patient for any abdominal pain or bleeding. A separate [employer] message sent yesterday indicates that she is reporting that her abdomen is \"hard.\" If she has abdominal pain, she should be directed to the ER for evaluation of possible ectopic pregnancy.     Assuming no acute abdominal symptoms or bleeding, she should be seen in the office this week, as she is on several medications that are contraindicated in pregnancy. This requires further discussion in office.     Norberto Milton MD  6/17/2024 12:50 PM    "

## 2024-06-23 ENCOUNTER — NURSE TRIAGE (OUTPATIENT)
Dept: OTHER | Facility: OTHER | Age: 34
End: 2024-06-23

## 2024-06-23 NOTE — TELEPHONE ENCOUNTER
"Reason for Disposition  • Prescription anti-emetic treatment for Morning Sickness, questions about  • Caller requesting information about medicine during pregnancy; adult is not ill AND triager answers question    Answer Assessment - Initial Assessment Questions  1. VOMITING SEVERITY: \"How many times have you vomited in the past 24 hours?\"       Not vomiting    2. ONSET: \"When did the vomiting begin?\"       6/22    3. FLUIDS: \"What fluids or food have you vomited up today?\" \"Are you able to keep any liquids down?\"      None, kept down everything    4. TREATMENT: \"What have you been doing so far to treat this?\"       No    5. DEHYDRATION: \"When was the last time you urinated?\" \"Are you feeling lightheaded?\" \"Weight loss?\"      Denies    6. PREGNANCY: \"How many weeks pregnant are you?\" \"How has the pregnancy been going?\"      6 weeks    7. BOSSMAN: \"What date are you expecting to deliver?\"      2/12/25    8. MEDICATIONS: \"What medications are you taking?\" (e.g., prenatal vitamins, iron)      Prenatal vitamins and all of her scheduled meds    9. OTHER SYMPTOMS: \"Do you have any other symptoms?\"      Nausea      Appt Thursday    Protocols used: Pregnancy - Morning Sickness (Nausea and Vomiting of Pregnancy)-ADULT-, Medication Question Call-ADULT-    "

## 2024-06-23 NOTE — TELEPHONE ENCOUNTER
"Regarding: pregnant/vomiting  ----- Message from Kerry COVINGTON sent at 6/23/2024 12:16 PM EDT -----  \"I am 6 weeks pregnant and I am not able to keep anything down.\"    "

## 2024-06-24 ENCOUNTER — OFFICE VISIT (OUTPATIENT)
Dept: FAMILY MEDICINE CLINIC | Facility: CLINIC | Age: 34
End: 2024-06-24
Payer: COMMERCIAL

## 2024-06-24 VITALS
BODY MASS INDEX: 53.29 KG/M2 | OXYGEN SATURATION: 97 % | WEIGHT: 289 LBS | HEART RATE: 76 BPM | SYSTOLIC BLOOD PRESSURE: 136 MMHG | DIASTOLIC BLOOD PRESSURE: 94 MMHG

## 2024-06-24 DIAGNOSIS — Z32.01 POSITIVE URINE PREGNANCY TEST: Primary | ICD-10-CM

## 2024-06-24 DIAGNOSIS — I10 BENIGN ESSENTIAL HTN: ICD-10-CM

## 2024-06-24 PROCEDURE — 99213 OFFICE O/P EST LOW 20 MIN: CPT | Performed by: NURSE PRACTITIONER

## 2024-06-24 RX ORDER — LABETALOL 100 MG/1
100 TABLET, FILM COATED ORAL 2 TIMES DAILY
Qty: 60 TABLET | Refills: 1 | Status: SHIPPED | OUTPATIENT
Start: 2024-06-24 | End: 2024-07-05

## 2024-06-24 NOTE — TELEPHONE ENCOUNTER
Spoke with patient - LMP 5/14/2024 - 5 wk 6 days by LMP.  Patient is not having vomiting, just nausea - tolerating liquids ( 5 (40 oz)/day & solid food.  Recommended to continue with increasing hydration, small frequent meals, bland diet & increasing protein. Tolerating prenatal vitamins.   Patient is scheduled for medication consult on 6/27/2024.  She is currently taking Lisinopril for HTN & Nortriptyline for sleep.  Advised patient to follow up with pcp re: BP med change since Lisinopril Cat D/ Dr Gardner.  Also advised to try Unisom for sleep instead of Nortripyline.  Patient will contact pcp.

## 2024-06-24 NOTE — PATIENT INSTRUCTIONS
Stop Lisinopril and Atorvastatin.  Start Labetolol for BP control.  F/up with OB/GYN as scheduled

## 2024-06-24 NOTE — PROGRESS NOTES
[facility]        NAME: Jennifer Johnson is a 33 y.o. female  : 1990    MRN: [id number]  DATE: 2024  TIME: 1:31 PM    Assessment and Plan   Positive urine pregnancy test [Z32.01]  1. Positive urine pregnancy test        2. Benign essential HTN  labetalol (NORMODYNE) 100 mg tablet            Patient Instructions     Patient Instructions   Stop Lisinopril and Atorvastatin.  Start Labetolol for BP control.  F/up with OB/GYN as scheduled           Chief Complaint     Chief Complaint   Patient presents with    Medication Management     Patient has stopped all her medications except the sleep aid( nortriptyline)          History of Present Illness       Patient had a positive urine pregnancy test. LMP 24.  Has appointment with OB/GYN on 24. BP is elevated today. She was taking Lisinopril for HX of HTN. She stopped all medications except Nortriptyline which she states OB said she can continue.        Review of Systems   Review of Systems   Constitutional:  Negative for activity change and fever.   Eyes:  Negative for visual disturbance.   Respiratory:  Negative for chest tightness and shortness of breath.    Cardiovascular:  Negative for chest pain.   Gastrointestinal:  Negative for abdominal pain.   Genitourinary:  Negative for difficulty urinating.   Neurological:  Negative for dizziness and weakness.   Psychiatric/Behavioral:  Negative for dysphoric mood.          Current Medications       Current Outpatient Medications:     labetalol (NORMODYNE) 100 mg tablet, Take 1 tablet (100 mg total) by mouth 2 (two) times a day, Disp: 60 tablet, Rfl: 1    nortriptyline (PAMELOR) 10 mg capsule, Take 1 capsule (10 mg total) by mouth daily at bedtime, Disp: 30 capsule, Rfl: 6    busPIRone (BUSPAR) 10 mg tablet, Take 1 tablet (10 mg total) by mouth 2 times per day (Patient not taking: Reported on 2024), Disp: 60 tablet, Rfl: 0    ondansetron (ZOFRAN) 4 mg tablet, Take 1 tablet (4  mg total) by mouth every 8 (eight) hours as needed for nausea or vomiting (Patient not taking: Reported on 2024), Disp: 20 tablet, Rfl: 0    SUMAtriptan (IMITREX) 50 mg tablet, , Disp: , Rfl:     Current Allergies     Allergies as of 2024    (No Known Allergies)            The following portions of the patient's history were reviewed and updated as appropriate: allergies, current medications, past family history, past medical history, past social history, past surgical history and problem list.     Past Medical History:   Diagnosis Date    Asthma     Hypertension     Migraine     Miscarriage     Ventral hernia        Past Surgical History:   Procedure Laterality Date     SECTION  2018    CHOLECYSTECTOMY  2012    KNEE SURGERY         Family History   Problem Relation Age of Onset    Hypertension Mother     Asthma Mother     Migraines Mother     Seizures Mother     ADD / ADHD Father     Breast cancer Sister     Colon cancer Neg Hx     Colon polyps Neg Hx          Medications have been verified.        Objective   /94 (BP Location: Left arm, Patient Position: Sitting, Cuff Size: Standard)   Pulse 76   Wt 131 kg (289 lb)   LMP 2024 (Exact Date)   SpO2 97%   Breastfeeding Unknown   BMI 53.29 kg/m²        Physical Exam     Physical Exam  Vitals and nursing note reviewed.   Constitutional:       Appearance: Normal appearance.   HENT:      Head: Normocephalic.   Eyes:      Extraocular Movements: Extraocular movements intact.   Cardiovascular:      Rate and Rhythm: Normal rate and regular rhythm.      Heart sounds: Normal heart sounds.   Pulmonary:      Effort: Pulmonary effort is normal.      Breath sounds: Normal breath sounds.   Skin:     General: Skin is warm and dry.      Coloration: Skin is not pale.      Findings: No erythema.   Neurological:      Mental Status: She is alert and oriented to person, place, and time.   Psychiatric:         Mood and Affect: Mood normal.          Behavior: Behavior normal.         PHQ-2/9 Depression Screening    Little interest or pleasure in doing things: 0 - not at all  Feeling down, depressed, or hopeless: 0 - not at all  PHQ-2 Score: 0  PHQ-2 Interpretation: Negative depression screen

## 2024-06-25 ENCOUNTER — HOSPITAL ENCOUNTER (EMERGENCY)
Facility: HOSPITAL | Age: 34
Discharge: HOME | End: 2024-06-25
Attending: EMERGENCY MEDICINE
Payer: COMMERCIAL

## 2024-06-25 VITALS
WEIGHT: 291 LBS | TEMPERATURE: 97.3 F | RESPIRATION RATE: 18 BRPM | SYSTOLIC BLOOD PRESSURE: 176 MMHG | HEIGHT: 61 IN | DIASTOLIC BLOOD PRESSURE: 100 MMHG | OXYGEN SATURATION: 94 % | BODY MASS INDEX: 54.94 KG/M2 | HEART RATE: 83 BPM

## 2024-06-25 DIAGNOSIS — N93.9 VAGINAL BLEEDING: Primary | ICD-10-CM

## 2024-06-25 DIAGNOSIS — O03.9 MISCARRIAGE: ICD-10-CM

## 2024-06-25 LAB
ABO GROUP BLD: NORMAL
ALBUMIN SERPL BCG-MCNC: 4.1 G/DL (ref 3.5–5)
ALP SERPL-CCNC: 73 U/L (ref 34–104)
ALT SERPL W P-5'-P-CCNC: 18 U/L (ref 7–52)
ANION GAP SERPL CALCULATED.3IONS-SCNC: 10 MMOL/L (ref 4–13)
AST SERPL W P-5'-P-CCNC: 13 U/L (ref 13–39)
B-HCG SERPL-ACNC: <0.6 MIU/ML (ref 0–5)
BASOPHILS # BLD AUTO: 0.02 THOUSANDS/ÂΜL (ref 0–0.1)
BASOPHILS NFR BLD AUTO: 0 % (ref 0–1)
BILIRUB SERPL-MCNC: 0.34 MG/DL (ref 0.2–1)
BLD GP AB SCN SERPL QL: NEGATIVE
BUN SERPL-MCNC: 6 MG/DL (ref 5–25)
CALCIUM SERPL-MCNC: 10 MG/DL (ref 8.4–10.2)
CHLORIDE SERPL-SCNC: 103 MMOL/L (ref 96–108)
CO2 SERPL-SCNC: 26 MMOL/L (ref 21–32)
CREAT SERPL-MCNC: 0.4 MG/DL (ref 0.6–1.3)
EOSINOPHIL # BLD AUTO: 0.09 THOUSAND/ÂΜL (ref 0–0.61)
EOSINOPHIL NFR BLD AUTO: 1 % (ref 0–6)
ERYTHROCYTE [DISTWIDTH] IN BLOOD BY AUTOMATED COUNT: 14 % (ref 11.6–15.1)
GFR SERPL CREATININE-BSD FRML MDRD: 139 ML/MIN/1.73SQ M
GLUCOSE SERPL-MCNC: 117 MG/DL (ref 65–140)
HCT VFR BLD AUTO: 37.8 % (ref 34.8–46.1)
HGB BLD-MCNC: 12.3 G/DL (ref 11.5–15.4)
IMM GRANULOCYTES # BLD AUTO: 0.09 THOUSAND/UL (ref 0–0.2)
IMM GRANULOCYTES NFR BLD AUTO: 1 % (ref 0–2)
LYMPHOCYTES # BLD AUTO: 2.01 THOUSANDS/ÂΜL (ref 0.6–4.47)
LYMPHOCYTES NFR BLD AUTO: 18 % (ref 14–44)
MCH RBC QN AUTO: 29.3 PG (ref 26.8–34.3)
MCHC RBC AUTO-ENTMCNC: 32.5 G/DL (ref 31.4–37.4)
MCV RBC AUTO: 90 FL (ref 82–98)
MONOCYTES # BLD AUTO: 0.68 THOUSAND/ÂΜL (ref 0.17–1.22)
MONOCYTES NFR BLD AUTO: 6 % (ref 4–12)
NEUTROPHILS # BLD AUTO: 8.38 THOUSANDS/ÂΜL (ref 1.85–7.62)
NEUTS SEG NFR BLD AUTO: 74 % (ref 43–75)
NRBC BLD AUTO-RTO: 0 /100 WBCS
PLATELET # BLD AUTO: 393 THOUSANDS/UL (ref 149–390)
PMV BLD AUTO: 9.4 FL (ref 8.9–12.7)
POTASSIUM SERPL-SCNC: 3.5 MMOL/L (ref 3.5–5.3)
PROT SERPL-MCNC: 7.7 G/DL (ref 6.4–8.4)
RBC # BLD AUTO: 4.2 MILLION/UL (ref 3.81–5.12)
RH BLD: NEGATIVE
SODIUM SERPL-SCNC: 139 MMOL/L (ref 135–147)
SPECIMEN EXPIRATION DATE: NORMAL
WBC # BLD AUTO: 11.27 THOUSAND/UL (ref 4.31–10.16)

## 2024-06-25 PROCEDURE — 86850 RBC ANTIBODY SCREEN: CPT | Performed by: EMERGENCY MEDICINE

## 2024-06-25 PROCEDURE — 80053 COMPREHEN METABOLIC PANEL: CPT | Performed by: EMERGENCY MEDICINE

## 2024-06-25 PROCEDURE — 84702 CHORIONIC GONADOTROPIN TEST: CPT | Performed by: EMERGENCY MEDICINE

## 2024-06-25 PROCEDURE — 86900 BLOOD TYPING SEROLOGIC ABO: CPT | Performed by: EMERGENCY MEDICINE

## 2024-06-25 PROCEDURE — 36415 COLL VENOUS BLD VENIPUNCTURE: CPT | Performed by: EMERGENCY MEDICINE

## 2024-06-25 PROCEDURE — 99284 EMERGENCY DEPT VISIT MOD MDM: CPT | Performed by: EMERGENCY MEDICINE

## 2024-06-25 PROCEDURE — 86901 BLOOD TYPING SEROLOGIC RH(D): CPT | Performed by: EMERGENCY MEDICINE

## 2024-06-25 PROCEDURE — 85025 COMPLETE CBC W/AUTO DIFF WBC: CPT | Performed by: EMERGENCY MEDICINE

## 2024-06-25 RX ADMIN — SODIUM CHLORIDE 500 ML: 0.9 INJECTION, SOLUTION INTRAVENOUS at 05:16

## 2024-06-25 NOTE — ED PROVIDER NOTES
History  Chief Complaint   Patient presents with    Vaginal Bleeding     Pt is [age] pregnants and has vaginal bleeding.     History from patient and medical records, past medical history fibromyalgia asthma hypertension migraine  ventral hernia, 7 pregnancies 5 miscarriages and 1 child, presents with concern for light vaginal bleeding for the last 12 hours but got more heavy over the last couple hours going through a pad per hour over the last couple hours with some lower pelvic pain.  Last normal menstrual period 517.  Unplanned pregnancy.  Not using birth control.        Prior to Admission Medications   Prescriptions Last Dose Informant Patient Reported? Taking?   SUMAtriptan (IMITREX) 50 mg tablet   Yes No   Patient not taking: Reported on 2024   busPIRone (BUSPAR) 10 mg tablet   No No   Sig: Take 1 tablet (10 mg total) by mouth 2 times per day   Patient not taking: Reported on 2024   labetalol (NORMODYNE) 100 mg tablet   No No   Sig: Take 1 tablet (100 mg total) by mouth 2 (two) times a day   nortriptyline (PAMELOR) 10 mg capsule   No No   Sig: Take 1 capsule (10 mg total) by mouth daily at bedtime   ondansetron (ZOFRAN) 4 mg tablet   No No   Sig: Take 1 tablet (4 mg total) by mouth every 8 (eight) hours as needed for nausea or vomiting   Patient not taking: Reported on 2024      Facility-Administered Medications: None       Past Medical History:   Diagnosis Date    Asthma     Fibromyalgia     Hypertension     Migraine     Miscarriage     Ventral hernia        Past Surgical History:   Procedure Laterality Date     SECTION      CHOLECYSTECTOMY  2012    KNEE SURGERY         Family History   Problem Relation Age of Onset    Hypertension Mother     Asthma Mother     Migraines Mother     Seizures Mother     ADD / ADHD Father     Breast cancer Sister     Colon cancer Neg Hx     Colon polyps Neg Hx      I have reviewed and agree with the history as  documented.    E-Cigarette/Vaping    E-Cigarette Use Current Every Day User      E-Cigarette/Vaping Substances    Nicotine Yes     THC No     CBD No     Flavoring No     Other No     Unknown No      Social History     Tobacco Use    Smoking status: Every Day     Current packs/day: 1.00     Average packs/day: 1 pack/day for 20.0 years (20.0 ttl pk-yrs)     Types: Cigarettes    Smokeless tobacco: Never    Tobacco comments:     Pt vapes on occasion, pt smoked hookah in the past    Vaping Use    Vaping status: Every Day    Substances: Nicotine   Substance Use Topics    Alcohol use: No    Drug use: Not Currently     Types: Marijuana       Review of Systems   Constitutional:  Negative for chills and fever.   HENT:  Negative for rhinorrhea and sore throat.    Respiratory:  Negative for shortness of breath.    Cardiovascular:  Negative for chest pain.   Gastrointestinal:  Negative for constipation, diarrhea, nausea and vomiting.   Genitourinary:  Positive for vaginal bleeding. Negative for dysuria and frequency.   Skin:  Negative for rash.   All other systems reviewed and are negative.      Physical Exam  Physical Exam  Vitals and nursing note reviewed.   Constitutional:       Appearance: She is well-developed. She is obese.   HENT:      Head: Normocephalic and atraumatic.      Right Ear: External ear normal.      Left Ear: External ear normal.      Nose: Nose normal.   Eyes:      Conjunctiva/sclera: Conjunctivae normal.      Pupils: Pupils are equal, round, and reactive to light.   Cardiovascular:      Rate and Rhythm: Normal rate and regular rhythm.      Heart sounds: Normal heart sounds.   Pulmonary:      Effort: Pulmonary effort is normal. No respiratory distress.      Breath sounds: Normal breath sounds. No wheezing.   Abdominal:      General: Bowel sounds are normal. There is no distension.      Palpations: Abdomen is soft.      Tenderness: There is no abdominal tenderness.   Musculoskeletal:         General: No  deformity. Normal range of motion.      Cervical back: Normal range of motion and neck supple. No spinous process tenderness.   Skin:     General: Skin is warm and dry.      Findings: No rash.   Neurological:      General: No focal deficit present.      Mental Status: She is alert.      GCS: GCS eye subscore is 4. GCS verbal subscore is 5. GCS motor subscore is 6.      Sensory: No sensory deficit.   Psychiatric:         Mood and Affect: Mood normal.         Vital Signs  ED Triage Vitals   Temperature Pulse Respirations Blood Pressure SpO2   06/25/24 0421 06/25/24 0420 06/25/24 0420 06/25/24 0420 06/25/24 0420   (!) 97.3 °F (36.3 °C) 83 18 (!) 176/100 94 %      Temp Source Heart Rate Source Patient Position - Orthostatic VS BP Location FiO2 (%)   06/25/24 0421 -- 06/25/24 0420 06/25/24 0420 --   Tympanic  Sitting Right arm       Pain Score       --                  Vitals:    06/25/24 0420   BP: (!) 176/100   Pulse: 83   Patient Position - Orthostatic VS: Sitting         Visual Acuity      ED Medications  Medications - No data to display    Diagnostic Studies  Results Reviewed       Procedure Component Value Units Date/Time    Pregnancy, hCG, quantitative [[id number]]  (Normal) Collected: 06/25/24 0438    Lab Status: Final result Specimen: Blood from Arm, Left Updated: 06/25/24 0512     HCG, Quant <0.6 mIU/mL     Narrative:       Expected Ranges:    HCG results between 5.0 and 25.0 mIU/mL may be indicative of early pregnancy but should be interpreted in light of the total clinical presentation.    HCG can rise to detectable levels in red and post menopausal women (0-11.6 mIU/mL).     Approximate               Approximate HCG  Gestation age          Concentration ( mIU/mL)  _____________          ______________________   Weeks                      HCG values  0.2-1                       5-50  1-2                           2-3                         100-5000  3-4                         500-18524  4-5                          1000-23066  5-6                         [id number]  6-8                         [id number]  8-12                        [id number]      Comprehensive metabolic panel [[id number]]  (Abnormal) Collected: 06/25/24 0438    Lab Status: Final result Specimen: Blood from Arm, Left Updated: 06/25/24 0507     Sodium 139 mmol/L      Potassium 3.5 mmol/L      Chloride 103 mmol/L      CO2 26 mmol/L      ANION GAP 10 mmol/L      BUN 6 mg/dL      Creatinine 0.40 mg/dL      Glucose 117 mg/dL      Calcium 10.0 mg/dL      AST 13 U/L      ALT 18 U/L      Alkaline Phosphatase 73 U/L      Total Protein 7.7 g/dL      Albumin 4.1 g/dL      Total Bilirubin 0.34 mg/dL      eGFR 139 ml/min/1.73sq m     Narrative:      National Kidney Disease Foundation guidelines for Chronic Kidney Disease (CKD):     Stage 1 with normal or high GFR (GFR > 90 mL/min/1.73 square meters)    Stage 2 Mild CKD (GFR = 60-89 mL/min/1.73 square meters)    Stage 3A Moderate CKD (GFR = 45-59 mL/min/1.73 square meters)    Stage 3B Moderate CKD (GFR = 30-44 mL/min/1.73 square meters)    Stage 4 Severe CKD (GFR = 15-29 mL/min/1.73 square meters)    Stage 5 End Stage CKD (GFR <15 mL/min/1.73 square meters)  Note: GFR calculation is accurate only with a steady state creatinine    CBC and differential [[id number]]  (Abnormal) Collected: 06/25/24 0438    Lab Status: Final result Specimen: Blood from Arm, Left Updated: 06/25/24 0449     WBC 11.27 Thousand/uL      RBC 4.20 Million/uL      Hemoglobin 12.3 g/dL      Hematocrit 37.8 %      MCV 90 fL      MCH 29.3 pg      MCHC 32.5 g/dL      RDW 14.0 %      MPV 9.4 fL      Platelets 393 Thousands/uL      nRBC 0 /100 WBCs      Segmented % 74 %      Immature Grans % 1 %      Lymphocytes % 18 %      Monocytes % 6 %      Eosinophils Relative 1 %      Basophils Relative 0 %      Absolute Neutrophils 8.38 Thousands/µL      Absolute Immature Grans 0.09 Thousand/uL      Absolute Lymphocytes 2.01 Thousands/µL      Absolute  Monocytes 0.68 Thousand/µL      Eosinophils Absolute 0.09 Thousand/µL      Basophils Absolute 0.02 Thousands/µL                    No orders to display              Procedures  Procedures         ED Course  ED Course as of 06/25/24 0535   Tue Jun 25, 2024   0516 hCG negative but patient thinks that she was pregnant as she had vomiting and nausea and her period was late.  Possibly already had the miscarriage.  I am not giving RhoGAM at this point because she is not pregnant at this time.                                             Medical Decision Making  Differential includes early pregnancy, miscarriage, dysfunctional uterine bleeding less likely ectopic ovary torsion hemorrhage or shock    Amount and/or Complexity of Data Reviewed  Labs: ordered.             Disposition  Final diagnoses:   Vaginal bleeding   Miscarriage     Time reflects when diagnosis was documented in both MDM as applicable and the Disposition within this note       Time User Action Codes Description Comment    6/25/2024  5:18 AM Andres Mark [N93.9] Vaginal bleeding     6/25/2024  5:18 AM Andres Mark [O03.9] Miscarriage           ED Disposition       ED Disposition   Discharge    Condition   Stable    Date/Time   Tue Jun 25, 2024  5:18 AM    Comment   Jennifer Johnson discharge to home/self care.                   Follow-up Information       Follow up With Specialties Details Why Contact Info Additional Information    [facility] [place] [facility] Schedule an appointment as soon as possible for a visit   [address]  [address] [address] [phone] [facility] [place], [address] [place], [address], [phone]            Discharge Medication List as of 6/25/2024  5:22 AM        CONTINUE these medications which have NOT CHANGED    Details   busPIRone (BUSPAR) 10 mg tablet Take 1 tablet (10 mg total) by mouth 2 times per day, Normal      labetalol  (NORMODYNE) 100 mg tablet Take 1 tablet (100 mg total) by mouth 2 (two) times a day, Starting Mon 6/24/2024, Normal      nortriptyline (PAMELOR) 10 mg capsule Take 1 capsule (10 mg total) by mouth daily at bedtime, Starting Wed 5/15/2024, Normal      ondansetron (ZOFRAN) 4 mg tablet Take 1 tablet (4 mg total) by mouth every 8 (eight) hours as needed for nausea or vomiting, Starting Tue 3/26/2024, Normal      SUMAtriptan (IMITREX) 50 mg tablet Historical Med                 PDMP Review         Value Time User    PDMP Reviewed  Yes 5/29/2024  5:51 PM Dimitrios Lynch DO            ED Provider  Electronically Signed by             Andres Mark DO  06/25/24 [id number]

## 2024-06-26 ENCOUNTER — NURSE TRIAGE (OUTPATIENT)
Age: 34
End: 2024-06-26

## 2024-06-26 NOTE — TELEPHONE ENCOUNTER
"Patient calling reporting abdominal pain that has been increasing to a 8/10 pain rate.  Patient reports going to the ER yesterday and miscarriage was confirmed.  Patient reports she is having normal vaginal bleeding like a period. She states her abdominal pain has never felt 'like this before\". She denies any heavy bleeding.  She is scheduled for an appointment tomorrow for a follow up.  Patient advised to report to the ER immediately to be further evaluated.  Patient verbalized understanding and voiced appreciation for phone call.       Reason for Disposition   SEVERE abdominal pain (e.g., excruciating)    Answer Assessment - Initial Assessment Questions  1. LOCATION: \"Where does it hurt?\"       Lower abdominal pain   2. RADIATION: \"Does the pain shoot anywhere else?\" (e.g., chest, back)      To the umbilicus   3. ONSET: \"When did the pain begin?\" (e.g., minutes, hours or days ago)       Last night   4. SUDDEN: \"Gradual or sudden onset?\"      Gradual   5. PATTERN \"Does the pain come and go, or is it constant?\"     - If constant: \"Is it getting better, staying the same, or worsening?\"       (Note: Constant means the pain never goes away completely; most serious pain is constant and it progresses)      - If intermittent: \"How long does it last?\" \"Do you have pain now?\"      (Note: Intermittent means the pain goes away completely between bouts)      Comes and goes   6. SEVERITY: \"How bad is the pain?\"  (e.g., Scale 1-10; mild, moderate, or severe)    - MILD (1-3): doesn't interfere with normal activities, abdomen soft and not tender to touch     - MODERATE (4-7): interferes with normal activities or awakens from sleep, tender to touch     - SEVERE (8-10): excruciating pain, doubled over, unable to do any normal activities       8/10  7. RECURRENT SYMPTOM: \"Have you ever had this type of stomach pain before?\" If Yes, ask: \"When was the last time?\" and \"What happened that time?\"       Denies   8. CAUSE: \"What do you " "think is causing the stomach pain?\"      Possible miscarriage   9. RELIEVING/AGGRAVATING FACTORS: \"What makes it better or worse?\" (e.g., movement, antacids, bowel movement)      N/a   10. OTHER SYMPTOMS: \"Has there been any vomiting, diarrhea, constipation, or urine problems?\"        Denies   11. PREGNANCY: \"Is there any chance you are pregnant?\" \"When was your last menstrual period?\"        LMP: 5/14/24    Protocols used: Abdominal Pain - Female-ADULT-[place]    "

## 2024-06-27 ENCOUNTER — TELEPHONE (OUTPATIENT)
Age: 34
End: 2024-06-27

## 2024-06-27 ENCOUNTER — OFFICE VISIT (OUTPATIENT)
Dept: OBGYN CLINIC | Facility: CLINIC | Age: 34
End: 2024-06-27
Payer: COMMERCIAL

## 2024-06-27 VITALS
HEIGHT: 61 IN | SYSTOLIC BLOOD PRESSURE: 148 MMHG | BODY MASS INDEX: 55.32 KG/M2 | WEIGHT: 293 LBS | DIASTOLIC BLOOD PRESSURE: 90 MMHG

## 2024-06-27 DIAGNOSIS — O03.9 MISCARRIAGE: ICD-10-CM

## 2024-06-27 DIAGNOSIS — N92.6 IRREGULAR BLEEDING: Primary | ICD-10-CM

## 2024-06-27 DIAGNOSIS — N93.9 VAGINAL BLEEDING: ICD-10-CM

## 2024-06-27 PROCEDURE — 99213 OFFICE O/P EST LOW 20 MIN: CPT | Performed by: OBSTETRICS & GYNECOLOGY

## 2024-06-27 RX ORDER — LISINOPRIL 20 MG/1
20 TABLET ORAL DAILY
COMMUNITY

## 2024-06-27 NOTE — PROGRESS NOTES
[facility] [place]  [address] [place] [address]    Assessment/Plan:  1. Vaginal bleeding  -     Ambulatory Referral to Obstetrics / Gynecology  2. Miscarriage  -     Ambulatory Referral to Obstetrics / Gynecology      Subjective:   Jennifer Johnson is a 33 y.o.  .  CC:   Chief Complaint   Patient presents with    Gynecology Problem     Miscarriage       HPI: 32yo female presents for follow up after ER. She reports after her bloodwork, about 2 weeks later she had a home positive pregnancy test. She reports her periods usually come around the 14th of the month. She was late about 2 weeks and it was very heavy where she was passing clots (which is abnormal for her). She reports the bleeding today is very light. She reports no complaints and is otherwise doing well.     ROS: Negative except as noted in HPI    Patient's last menstrual period was 2024 (exact date).       She  reports that she is not currently sexually active and has had partner(s) who are male.       The following portions of the patient's history were reviewed and updated as appropriate:   Past Medical History:   Diagnosis Date    Asthma     Fibromyalgia     Hypertension     Migraine     Miscarriage     Ventral hernia      Past Surgical History:   Procedure Laterality Date     SECTION  2018    CHOLECYSTECTOMY  2012    KNEE SURGERY       Family History   Problem Relation Age of Onset    Hypertension Mother     Asthma Mother     Migraines Mother     Seizures Mother     ADD / ADHD Father     Breast cancer Sister     Colon cancer Neg Hx     Colon polyps Neg Hx      Social History     Socioeconomic History    Marital status: Single     Spouse name: Not on file    Number of children: Not on file    Years of education: Not on file    Highest education level: Not on file   Occupational History    Not on file   Tobacco Use    Smoking status: Every Day     Current packs/day: 1.00     Average packs/day: 1 pack/day for 20.0  "years (20.0 ttl pk-yrs)     Types: Cigarettes    Smokeless tobacco: Never    Tobacco comments:     Pt vapes on occasion, pt smoked hookah in the past    Vaping Use    Vaping status: Every Day    Substances: Nicotine   Substance and Sexual Activity    Alcohol use: No    Drug use: Not Currently     Types: Marijuana    Sexual activity: Not Currently     Partners: Male   Other Topics Concern    Not on file   Social History Narrative    Not on file     Social Determinants of Health     Financial Resource Strain: Not on file   Food Insecurity: Not on file   Transportation Needs: Not on file   Physical Activity: Not on file   Stress: Not on file   Social Connections: Not on file   Intimate Partner Violence: Not on file   Housing Stability: Not on file     Outpatient Medications Marked as Taking for the 6/27/24 encounter (Office Visit) with Chioma DIAS DO   Medication    busPIRone (BUSPAR) 10 mg tablet    labetalol (NORMODYNE) 100 mg tablet    lisinopril (ZESTRIL) 20 mg tablet    nortriptyline (PAMELOR) 10 mg capsule    SUMAtriptan (IMITREX) 50 mg tablet     No Known Allergies        Objective:  /90 (BP Location: Right arm, Patient Position: Sitting, Cuff Size: Standard)   Ht 5' 1\" (1.549 m)   Wt 133 kg (293 lb)   LMP 05/17/2024 (Exact Date)   BMI 55.36 kg/m²          General Appearance: alert and oriented, in no acute distress.   Extremities: Normal range of motion.   Skin: normal, no rash or abnormalities  Neurologic: alert, oriented x3  Psychiatric: Appropriate affect, mood stable, cooperative with exam.        Chioma Tapia DO  6/27/2024 11:20 AM   "

## 2024-06-27 NOTE — TELEPHONE ENCOUNTER
Pt called upset stating she saw the provider today and was told by provider she did not have a miscarriage and was not pregnant. Pt stated she knows her body and she definitely was pregnant and says provider did nothing for her miscarriage. Pt says she still has stomach pains and wants to leave provider for another [facility] provider. Pt states she would like a call back to further discuss her options.

## 2024-07-01 ENCOUNTER — TELEPHONE (OUTPATIENT)
Dept: PSYCHIATRY | Facility: CLINIC | Age: 34
End: 2024-07-01

## 2024-07-01 ENCOUNTER — NURSE TRIAGE (OUTPATIENT)
Age: 34
End: 2024-07-01

## 2024-07-01 DIAGNOSIS — M79.7 FIBROMYALGIA: Primary | ICD-10-CM

## 2024-07-01 DIAGNOSIS — M19.90 OSTEOARTHRITIS, UNSPECIFIED OSTEOARTHRITIS TYPE, UNSPECIFIED SITE: ICD-10-CM

## 2024-07-01 RX ORDER — MELOXICAM 15 MG/1
15 TABLET ORAL DAILY
Qty: 30 TABLET | Refills: 6 | Status: SHIPPED | OUTPATIENT
Start: 2024-07-01

## 2024-07-01 RX ORDER — GABAPENTIN 100 MG/1
100 CAPSULE ORAL 3 TIMES DAILY
Qty: 90 CAPSULE | Refills: 6 | Status: SHIPPED | OUTPATIENT
Start: 2024-07-01

## 2024-07-01 NOTE — TELEPHONE ENCOUNTER
Pt of Dr. Deleon with hx of fibromyalgia.    Pt c/o of worsening pain recently. Pain is generalized, no specific areas. Denies other associated symptoms. She has been compliant with all prescribed medications. She is wondering if there is anything else she can do for the pain.
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
## 2024-07-01 NOTE — TELEPHONE ENCOUNTER
Did she do all 3?    Nortriptyline, tizanidine, and meloxicam?    I can send gabapentin over as well.    I'm not sure what else to do for her, she should talk to her PCP or pain management.

## 2024-07-01 NOTE — TELEPHONE ENCOUNTER
Patient is calling regarding cancelling an appointment.    Date/Time: 7/2/24 at 4 pm    Reason:     Patient was rescheduled: YES [x] NO []  If yes, when was Patient reschedule for: client rescheduled for 7/5/24 at 2:30 pm     Patient requesting call back to reschedule: YES [] NO [x]

## 2024-07-02 ENCOUNTER — TELEPHONE (OUTPATIENT)
Age: 34
End: 2024-07-02

## 2024-07-02 NOTE — TELEPHONE ENCOUNTER
Dr. Deleon    Pain Management Referral   [facility] - [facility]  [phone]    Please sent referral and last OV note.    Notify patient when done.

## 2024-07-02 NOTE — TELEPHONE ENCOUNTER
Pt called to schedule NP appt for fibromyalgia.  Pt made aware that are docs do not treat fibromyalgia

## 2024-07-03 ENCOUNTER — PATIENT MESSAGE (OUTPATIENT)
Dept: FAMILY MEDICINE CLINIC | Facility: CLINIC | Age: 34
End: 2024-07-03

## 2024-07-03 NOTE — TELEPHONE ENCOUNTER
Patient calling for update on pain management referral, please place so she can schedule. Thank you.

## 2024-07-05 ENCOUNTER — HOSPITAL ENCOUNTER (EMERGENCY)
Facility: HOSPITAL | Age: 34
Discharge: HOME | End: 2024-07-05
Attending: EMERGENCY MEDICINE | Admitting: EMERGENCY MEDICINE
Payer: COMMERCIAL

## 2024-07-05 ENCOUNTER — OFFICE VISIT (OUTPATIENT)
Dept: PSYCHIATRY | Facility: CLINIC | Age: 34
End: 2024-07-05
Payer: COMMERCIAL

## 2024-07-05 VITALS
TEMPERATURE: 98.3 F | DIASTOLIC BLOOD PRESSURE: 80 MMHG | RESPIRATION RATE: 18 BRPM | SYSTOLIC BLOOD PRESSURE: 148 MMHG | OXYGEN SATURATION: 98 % | HEART RATE: 75 BPM

## 2024-07-05 DIAGNOSIS — F41.9 ANXIETY: Primary | ICD-10-CM

## 2024-07-05 DIAGNOSIS — G43.909 MIGRAINE: Primary | ICD-10-CM

## 2024-07-05 PROBLEM — N92.6 IRREGULAR BLEEDING: Status: RESOLVED | Noted: 2024-06-27 | Resolved: 2024-07-05

## 2024-07-05 LAB
EXT PREGNANCY TEST URINE: NEGATIVE
EXT. CONTROL: NORMAL

## 2024-07-05 PROCEDURE — 81025 URINE PREGNANCY TEST: CPT

## 2024-07-05 PROCEDURE — 99284 EMERGENCY DEPT VISIT MOD MDM: CPT | Performed by: EMERGENCY MEDICINE

## 2024-07-05 PROCEDURE — 99212 OFFICE O/P EST SF 10 MIN: CPT

## 2024-07-05 RX ORDER — KETOROLAC TROMETHAMINE 30 MG/ML
15 INJECTION, SOLUTION INTRAMUSCULAR; INTRAVENOUS ONCE
Status: COMPLETED | OUTPATIENT
Start: 2024-07-05 | End: 2024-07-05

## 2024-07-05 RX ORDER — DIPHENHYDRAMINE HYDROCHLORIDE 50 MG/ML
25 INJECTION INTRAMUSCULAR; INTRAVENOUS ONCE
Status: COMPLETED | OUTPATIENT
Start: 2024-07-05 | End: 2024-07-05

## 2024-07-05 RX ORDER — METOCLOPRAMIDE HYDROCHLORIDE 5 MG/ML
10 INJECTION INTRAMUSCULAR; INTRAVENOUS ONCE
Status: COMPLETED | OUTPATIENT
Start: 2024-07-05 | End: 2024-07-05

## 2024-07-05 RX ADMIN — DIPHENHYDRAMINE HYDROCHLORIDE 25 MG: 50 INJECTION, SOLUTION INTRAMUSCULAR; INTRAVENOUS at 09:23

## 2024-07-05 RX ADMIN — SODIUM CHLORIDE 1000 ML: 0.9 INJECTION, SOLUTION INTRAVENOUS at 09:22

## 2024-07-05 RX ADMIN — KETOROLAC TROMETHAMINE 15 MG: 30 INJECTION, SOLUTION INTRAMUSCULAR; INTRAVENOUS at 09:23

## 2024-07-05 RX ADMIN — METOCLOPRAMIDE 10 MG: 5 INJECTION, SOLUTION INTRAMUSCULAR; INTRAVENOUS at 09:23

## 2024-07-05 NOTE — ED PROVIDER NOTES
"History  Chief Complaint   Patient presents with    Headache     Pt reports a headache for 2 weeks. States she is on migraine medications but they are not working.      Patient is a 33-year-old female past medical history of fibromyalgia, migraines, hypertension arriving for evaluation of ongoing headache for the past 2 weeks.  Patient reports intermittent photosensitivity.  Patient states no relief from propranolol.  Patient states that she is \"trying to walk,\" when asked about Tylenol Motrin.  Patient reports that it is her usual headache, gradual onset.  Patient has no nuchal rigidity, no fever.  Patient's headache is frontal as has presented in the past per the patient.  Patient has no report of her vision.  Patient has no numbness or tingling.  Patient's headache has been constant. No focal neuro deficits on exam.         Prior to Admission Medications   Prescriptions Last Dose Informant Patient Reported? Taking?   SUMAtriptan (IMITREX) 50 mg tablet   Yes No   busPIRone (BUSPAR) 10 mg tablet   No No   Sig: Take 1 tablet (10 mg total) by mouth 2 times per day   gabapentin (Neurontin) 100 mg capsule   No No   Sig: Take 1 capsule (100 mg total) by mouth 3 (three) times a day   labetalol (NORMODYNE) 100 mg tablet   No No   Sig: Take 1 tablet (100 mg total) by mouth 2 (two) times a day   lisinopril (ZESTRIL) 20 mg tablet   Yes No   Sig: Take 20 mg by mouth daily   meloxicam (Mobic) 15 mg tablet   No No   Sig: Take 1 tablet (15 mg total) by mouth daily   nortriptyline (PAMELOR) 10 mg capsule   No No   Sig: Take 1 capsule (10 mg total) by mouth daily at bedtime   ondansetron (ZOFRAN) 4 mg tablet   No No   Sig: Take 1 tablet (4 mg total) by mouth every 8 (eight) hours as needed for nausea or vomiting   Patient not taking: Reported on 6/24/2024      Facility-Administered Medications: None       Past Medical History:   Diagnosis Date    Asthma     Fibromyalgia     Hypertension     Migraine     Miscarriage     Ventral " hernia        Past Surgical History:   Procedure Laterality Date     SECTION  2018    CHOLECYSTECTOMY  2012    KNEE SURGERY         Family History   Problem Relation Age of Onset    Hypertension Mother     Asthma Mother     Migraines Mother     Seizures Mother     ADD / ADHD Father     Breast cancer Sister     Colon cancer Neg Hx     Colon polyps Neg Hx      I have reviewed and agree with the history as documented.    E-Cigarette/Vaping    E-Cigarette Use Current Every Day User      E-Cigarette/Vaping Substances    Nicotine Yes     THC No     CBD No     Flavoring No     Other No     Unknown No      Social History     Tobacco Use    Smoking status: Every Day     Current packs/day: 1.00     Average packs/day: 1 pack/day for 20.0 years (20.0 ttl pk-yrs)     Types: Cigarettes    Smokeless tobacco: Never    Tobacco comments:     Pt vapes on occasion, pt smoked hookah in the past    Vaping Use    Vaping status: Every Day    Substances: Nicotine   Substance Use Topics    Alcohol use: No    Drug use: Not Currently     Types: Marijuana       Review of Systems   Constitutional: Negative.    HENT: Negative.     Eyes: Negative.    Respiratory: Negative.     Cardiovascular: Negative.    Gastrointestinal: Negative.    Endocrine: Negative.    Genitourinary: Negative.    Musculoskeletal: Negative.    Allergic/Immunologic: Negative.    Neurological:  Positive for headaches.   Hematological: Negative.    Psychiatric/Behavioral: Negative.     All other systems reviewed and are negative.      Physical Exam  Physical Exam  Vitals and nursing note reviewed.   Constitutional:       Appearance: Normal appearance. She is normal weight.   HENT:      Head: Normocephalic.      Right Ear: External ear normal.      Left Ear: External ear normal.      Nose: Nose normal.      Mouth/Throat:      Mouth: Mucous membranes are moist.      Pharynx: Oropharynx is clear.   Eyes:      Extraocular Movements: Extraocular movements intact.       Conjunctiva/sclera: Conjunctivae normal.      Pupils: Pupils are equal, round, and reactive to light.   Cardiovascular:      Rate and Rhythm: Normal rate and regular rhythm.      Pulses: Normal pulses.      Heart sounds: Normal heart sounds.   Pulmonary:      Effort: Pulmonary effort is normal.      Breath sounds: Normal breath sounds.   Abdominal:      General: Abdomen is flat. Bowel sounds are normal.      Palpations: Abdomen is soft.   Musculoskeletal:      Cervical back: Normal range of motion and neck supple.   Skin:     General: Skin is warm.      Capillary Refill: Capillary refill takes less than 2 seconds.   Neurological:      General: No focal deficit present.      Mental Status: She is alert and oriented to person, place, and time. Mental status is at baseline.      GCS: GCS eye subscore is 4. GCS verbal subscore is 5. GCS motor subscore is 6.      Cranial Nerves: Cranial nerves 2-12 are intact.      Sensory: Sensation is intact. No sensory deficit.      Motor: Motor function is intact.      Coordination: Coordination is intact.      Gait: Gait is intact.      Comments: 5/5 upper extremity strength, no numbness or tingling   5/5 lower extremity strength, no numbness or tingling    Psychiatric:         Mood and Affect: Mood normal.         Behavior: Behavior normal.         Thought Content: Thought content normal.         Judgment: Judgment normal.         Vital Signs  ED Triage Vitals   Temperature Pulse Respirations Blood Pressure SpO2   07/05/24 0856 07/05/24 0855 07/05/24 0855 07/05/24 0856 07/05/24 0855   98.3 °F (36.8 °C) 73 20 158/82 98 %      Temp Source Heart Rate Source Patient Position - Orthostatic VS BP Location FiO2 (%)   07/05/24 0856 07/05/24 0855 -- -- --   Oral Monitor         Pain Score       07/05/24 0923       7           Vitals:    07/05/24 0855 07/05/24 0856   BP:  158/82   Pulse: 73          Visual Acuity      ED Medications  Medications   sodium chloride 0.9 % bolus 1,000 mL (1,000  mL Intravenous New Bag 7/5/24 0922)   metoclopramide (REGLAN) injection 10 mg (10 mg Intravenous Given 7/5/24 0923)   ketorolac (TORADOL) injection 15 mg (15 mg Intravenous Given 7/5/24 0923)   diphenhydrAMINE (BENADRYL) injection 25 mg (25 mg Intravenous Given 7/5/24 0923)       Diagnostic Studies  Results Reviewed       Procedure Component Value Units Date/Time    POCT pregnancy, urine [[id number]]  (Normal) Resulted: 07/05/24 0921    Lab Status: Final result Updated: 07/05/24 0921     EXT Preg Test, Ur Negative     Control Valid                   No orders to display              Procedures  Procedures         ED Course  ED Course as of 07/05/24 0957 Fri Jul 05, 2024 0952 Patient reported to staff she is feeling improved and is requesting discharged.                                SBIRT 20yo+      Flowsheet Row Most Recent Value   Initial Alcohol Screen: US AUDIT-C     1. How often do you have a drink containing alcohol? 0 Filed at: 07/05/2024 0856   2. How many drinks containing alcohol do you have on a typical day you are drinking?  0 Filed at: 07/05/2024 0856   3a. Male UNDER 65: How often do you have five or more drinks on one occasion? 0 Filed at: 07/05/2024 0856   3b. FEMALE Any Age, or MALE 65+: How often do you have 4 or more drinks on one occassion? 0 Filed at: 07/05/2024 0856   Audit-C Score 0 Filed at: 07/05/2024 0856   TRINITY: How many times in the past year have you...    Used an illegal drug or used a prescription medication for non-medical reasons? Never Filed at: 07/05/2024 0856                      Medical Decision Making  Patient presenting with migraine headache with a history of known migraines, similar to prior headaches.  Patient states normally from propranolol.  Patient has no red flag symptoms, no nuchal rigidity, no sudden onset of headache. Denies focal neuro deficits.  Patient endorses that this is similar to prior migraines, only unrelieved by propranolol today.  Will provide  migraine cocktail. Pregnancy test is negative. Denies CP, SOB, Abdominal pain. Denies head trauma.  Patient states that she feels much improved and is requesting discharge.  Patient states that she has a neurologist already, recommended to follow-up with her neurology team for breakthrough migraine.  Aware to return for worsening symptoms.  Reviewed reasons to return to ed.  Patient verbalized understanding of diagnosis and agreement with discharge plan of care as well as understanding of reasons to return to ed.        Amount and/or Complexity of Data Reviewed  Labs: ordered.    Risk  Prescription drug management.             Disposition  Final diagnoses:   Migraine     Time reflects when diagnosis was documented in both MDM as applicable and the Disposition within this note       Time User Action Codes Description Comment    7/5/2024  9:52 AM Cecelia Can Add [G43.909] Migraine           ED Disposition       ED Disposition   Discharge    Condition   Stable    Date/Time   Fri Jul 5, 2024 0952    Comment   Jennifer Johnson discharge to home/self care.                   Follow-up Information       Follow up With Specialties Details Why Contact Info Additional Information    [facility]   [address]  [address]  [facility] [place] [address]  [phone] [facility], [address], [place], [place], [address]     [phone]     [facility]   [address] [address]  [phone] [facility], [address], [place] [address]            Patient's Medications   Discharge Prescriptions    No medications on file           PDMP Review         Value Time User    PDMP Reviewed  Yes 5/29/2024  5:51 PM Dimitrios Lynch DO            ED Provider  Electronically Signed by              DIXIE Medley  07/05/24 1000

## 2024-07-07 DIAGNOSIS — M19.90 ARTHRITIS: ICD-10-CM

## 2024-07-07 DIAGNOSIS — M19.90 OSTEOARTHRITIS, UNSPECIFIED OSTEOARTHRITIS TYPE, UNSPECIFIED SITE: Primary | ICD-10-CM

## 2024-07-07 DIAGNOSIS — M79.7 FIBROMYALGIA: ICD-10-CM

## 2024-07-08 ENCOUNTER — TELEPHONE (OUTPATIENT)
Age: 34
End: 2024-07-08

## 2024-07-08 NOTE — TELEPHONE ENCOUNTER
Cortney from [facility] call to confirm which BP medication pt is taking. I told her provider instructions- Stop Lisinopril and Atorvastatin.  Start Labetolol for BP control.  F/up with OB/GYN.

## 2024-07-19 DIAGNOSIS — F17.210 CIGARETTE NICOTINE DEPENDENCE WITHOUT COMPLICATION: Primary | ICD-10-CM

## 2024-07-19 RX ORDER — VARENICLINE TARTRATE 0.5 (11)-1
KIT ORAL
Qty: 53 EACH | Refills: 0 | Status: SHIPPED | OUTPATIENT
Start: 2024-07-19

## 2024-07-22 ENCOUNTER — TELEPHONE (OUTPATIENT)
Age: 34
End: 2024-07-22

## 2024-07-22 NOTE — TELEPHONE ENCOUNTER
Pt called stating she has Jury Duty and she needs a letter stating that she is unable to do Jury Duty d/t disabilities. Pt needs letter by 8/16.     Jury Duty number: [id number]    Please advise

## 2024-07-25 ENCOUNTER — OFFICE VISIT (OUTPATIENT)
Dept: FAMILY MEDICINE CLINIC | Facility: CLINIC | Age: 34
End: 2024-07-25
Payer: COMMERCIAL

## 2024-07-25 VITALS — DIASTOLIC BLOOD PRESSURE: 84 MMHG | SYSTOLIC BLOOD PRESSURE: 136 MMHG

## 2024-07-25 DIAGNOSIS — G43.009 MIGRAINE WITHOUT AURA AND WITHOUT STATUS MIGRAINOSUS, NOT INTRACTABLE: ICD-10-CM

## 2024-07-25 DIAGNOSIS — M79.7 FIBROMYALGIA: ICD-10-CM

## 2024-07-25 DIAGNOSIS — I10 BENIGN ESSENTIAL HTN: Primary | ICD-10-CM

## 2024-07-25 PROCEDURE — 99214 OFFICE O/P EST MOD 30 MIN: CPT | Performed by: FAMILY MEDICINE

## 2024-07-25 RX ORDER — TIZANIDINE 2 MG/1
2 TABLET ORAL 3 TIMES DAILY
COMMUNITY
Start: 2024-07-06

## 2024-07-25 RX ORDER — ATORVASTATIN CALCIUM 10 MG/1
10 TABLET, FILM COATED ORAL DAILY
COMMUNITY
End: 2024-07-30

## 2024-07-25 RX ORDER — PROPRANOLOL HYDROCHLORIDE 40 MG/1
80 TABLET ORAL 2 TIMES DAILY
Qty: 360 TABLET | Refills: 3 | Status: SHIPPED | OUTPATIENT
Start: 2024-07-25

## 2024-07-25 RX ORDER — CELECOXIB 200 MG/1
200 CAPSULE ORAL DAILY
COMMUNITY
Start: 2024-07-19 | End: 2024-07-31

## 2024-07-25 RX ORDER — PROPRANOLOL HYDROCHLORIDE 40 MG/1
TABLET ORAL
COMMUNITY
End: 2024-07-25 | Stop reason: SDUPTHER

## 2024-07-25 RX ORDER — GABAPENTIN 300 MG/1
300 CAPSULE ORAL 3 TIMES DAILY
COMMUNITY
Start: 2024-07-18

## 2024-07-25 RX ORDER — BUSPIRONE HYDROCHLORIDE 10 MG/1
10 TABLET ORAL 2 TIMES DAILY
COMMUNITY
Start: 2024-05-31 | End: 2024-07-31

## 2024-07-25 RX ORDER — LISINOPRIL 10 MG/1
10 TABLET ORAL DAILY
Qty: 90 TABLET | Refills: 3 | Status: SHIPPED | OUTPATIENT
Start: 2024-07-25

## 2024-07-25 RX ORDER — ELETRIPTAN HYDROBROMIDE 40 MG/1
TABLET, FILM COATED ORAL
COMMUNITY

## 2024-07-25 NOTE — PROGRESS NOTES
Assessment/Plan:    No problem-specific Assessment & Plan notes found for this encounter.       Diagnoses and all orders for this visit:    Benign essential HTN  -     propranolol (INDERAL) 40 mg tablet; Take 2 tablets (80 mg total) by mouth 2 (two) times a day  -     lisinopril (ZESTRIL) 10 mg tablet; Take 1 tablet (10 mg total) by mouth daily    Migraine without aura and without status migrainosus, not intractable    Fibromyalgia    Other orders  -     busPIRone (BUSPAR) 10 mg tablet; Take 10 mg by mouth 2 (two) times a day  -     celecoxib (CeleBREX) 200 mg capsule; Take 200 mg by mouth daily Take with food  -     eletriptan (RELPAX) 40 MG tablet; TAKE ONE TABLET BY MOUTH AT ONSET OF MIGRAINE. IF SYMPTOMS PERSIST, A SECOND DOSE MAY BE TAKEN IN 2 HOURS. DO NOT EXCEED 2 DOSES IN A 24 HOUR PERIOD  -     Discontinue: propranolol (INDERAL) 40 mg tablet; TAKE ONE TABLET BY MOUTH IN THE MORNING AND TAKE TWO TABLETS BY MOUTH IN THE EVENING FOR ONE WEEK, THEN INCREASE TO TWO TABLETS BY MOUTH TWICE DAILY  -     tiZANidine (ZANAFLEX) 2 mg tablet; Take 2 mg by mouth Three times a day  -     gabapentin (NEURONTIN) 300 mg capsule; Take 300 mg by mouth 3 (three) times a day  -     atorvastatin (LIPITOR) 10 mg tablet; Take 10 mg by mouth daily          Subjective:   Chief Complaint   Patient presents with    Follow-up     Patient states BP this am 133/79, 138/62, last pm 100/41,118/48        Patient ID: Jennifer Johnson is a 33 y.o. female.    HPI    The following portions of the patient's history were reviewed and updated as appropriate: allergies, current medications, past family history, past medical history, past social history, past surgical history and problem list.    Review of Systems   Constitutional:  Negative for fatigue, fever and unexpected weight change.   HENT:  Negative for congestion, sinus pain and sore throat.    Eyes:  Negative for visual disturbance.   Respiratory:  Negative for shortness of breath and wheezing.     Cardiovascular:  Negative for chest pain and palpitations.   Gastrointestinal:  Negative for abdominal pain, nausea and vomiting.   Musculoskeletal: Negative.  Negative for arthralgias and myalgias.   Neurological:  Negative for syncope, weakness and numbness.   Psychiatric/Behavioral: Negative.  Negative for confusion, dysphoric mood and suicidal ideas.          Objective:  Vitals:    07/25/24 0958   BP: 136/84   BP Location: Left arm   Patient Position: Sitting   Cuff Size: Large      Physical Exam  Constitutional:       Appearance: She is well-developed.   HENT:      Right Ear: Ear canal normal. Tympanic membrane is not injected.      Left Ear: Ear canal normal. Tympanic membrane is not injected.      Nose: Nose normal.   Eyes:      General:         Right eye: No discharge.         Left eye: No discharge.      Conjunctiva/sclera: Conjunctivae normal.      Pupils: Pupils are equal, round, and reactive to light.   Neck:      Thyroid: No thyromegaly.   Cardiovascular:      Rate and Rhythm: Normal rate and regular rhythm.      Heart sounds: Normal heart sounds. No murmur heard.  Pulmonary:      Effort: Pulmonary effort is normal. No respiratory distress.      Breath sounds: Normal breath sounds. No wheezing.   Abdominal:      General: Bowel sounds are normal. There is no distension.      Palpations: Abdomen is soft.      Tenderness: There is no abdominal tenderness.   Musculoskeletal:         General: Normal range of motion.      Cervical back: Normal range of motion and neck supple.   Lymphadenopathy:      Cervical: No cervical adenopathy.   Skin:     General: Skin is warm and dry.   Neurological:      Mental Status: She is alert and oriented to person, place, and time. She is not disoriented.      Sensory: No sensory deficit.      Motor: No weakness.      Coordination: Coordination normal.      Gait: Gait normal.      Deep Tendon Reflexes: Reflexes are normal and symmetric.   Psychiatric:         Speech: Speech  normal.         Behavior: Behavior normal.         Thought Content: Thought content normal.         Judgment: Judgment normal.

## 2024-07-27 ENCOUNTER — HOSPITAL ENCOUNTER (EMERGENCY)
Facility: HOSPITAL | Age: 34
Discharge: HOME | End: 2024-07-27
Attending: STUDENT IN AN ORGANIZED HEALTH CARE EDUCATION/TRAINING PROGRAM | Admitting: STUDENT IN AN ORGANIZED HEALTH CARE EDUCATION/TRAINING PROGRAM
Payer: COMMERCIAL

## 2024-07-27 ENCOUNTER — APPOINTMENT (OUTPATIENT)
Dept: RADIOLOGY | Facility: HOSPITAL | Age: 34
End: 2024-07-27
Payer: COMMERCIAL

## 2024-07-27 VITALS
OXYGEN SATURATION: 98 % | RESPIRATION RATE: 20 BRPM | TEMPERATURE: 98.8 F | DIASTOLIC BLOOD PRESSURE: 88 MMHG | SYSTOLIC BLOOD PRESSURE: 137 MMHG | HEART RATE: 64 BPM

## 2024-07-27 DIAGNOSIS — R51.9 HEADACHE: ICD-10-CM

## 2024-07-27 DIAGNOSIS — R07.89 ATYPICAL CHEST PAIN: Primary | ICD-10-CM

## 2024-07-27 LAB
ALBUMIN SERPL BCG-MCNC: 4.1 G/DL (ref 3.5–5)
ALP SERPL-CCNC: 77 U/L (ref 34–104)
ALT SERPL W P-5'-P-CCNC: 19 U/L (ref 7–52)
ANION GAP SERPL CALCULATED.3IONS-SCNC: 7 MMOL/L (ref 4–13)
AST SERPL W P-5'-P-CCNC: 13 U/L (ref 13–39)
BASOPHILS # BLD AUTO: 0.05 THOUSANDS/ÂΜL (ref 0–0.1)
BASOPHILS NFR BLD AUTO: 0 % (ref 0–1)
BILIRUB SERPL-MCNC: 0.34 MG/DL (ref 0.2–1)
BUN SERPL-MCNC: 8 MG/DL (ref 5–25)
CALCIUM SERPL-MCNC: 9.8 MG/DL (ref 8.4–10.2)
CARDIAC TROPONIN I PNL SERPL HS: 3 NG/L
CHLORIDE SERPL-SCNC: 103 MMOL/L (ref 96–108)
CO2 SERPL-SCNC: 27 MMOL/L (ref 21–32)
CREAT SERPL-MCNC: 0.44 MG/DL (ref 0.6–1.3)
EOSINOPHIL # BLD AUTO: 0.15 THOUSAND/ÂΜL (ref 0–0.61)
EOSINOPHIL NFR BLD AUTO: 1 % (ref 0–6)
ERYTHROCYTE [DISTWIDTH] IN BLOOD BY AUTOMATED COUNT: 14 % (ref 11.6–15.1)
FLUAV RNA RESP QL NAA+PROBE: NEGATIVE
FLUBV RNA RESP QL NAA+PROBE: NEGATIVE
GFR SERPL CREATININE-BSD FRML MDRD: 134 ML/MIN/1.73SQ M
GLUCOSE SERPL-MCNC: 86 MG/DL (ref 65–140)
HCT VFR BLD AUTO: 37.8 % (ref 34.8–46.1)
HGB BLD-MCNC: 12 G/DL (ref 11.5–15.4)
IMM GRANULOCYTES # BLD AUTO: 0.09 THOUSAND/UL (ref 0–0.2)
IMM GRANULOCYTES NFR BLD AUTO: 1 % (ref 0–2)
LYMPHOCYTES # BLD AUTO: 2.68 THOUSANDS/ÂΜL (ref 0.6–4.47)
LYMPHOCYTES NFR BLD AUTO: 21 % (ref 14–44)
MCH RBC QN AUTO: 29 PG (ref 26.8–34.3)
MCHC RBC AUTO-ENTMCNC: 31.7 G/DL (ref 31.4–37.4)
MCV RBC AUTO: 91 FL (ref 82–98)
MONOCYTES # BLD AUTO: 0.81 THOUSAND/ÂΜL (ref 0.17–1.22)
MONOCYTES NFR BLD AUTO: 6 % (ref 4–12)
NEUTROPHILS # BLD AUTO: 9.2 THOUSANDS/ÂΜL (ref 1.85–7.62)
NEUTS SEG NFR BLD AUTO: 71 % (ref 43–75)
NRBC BLD AUTO-RTO: 0 /100 WBCS
PLATELET # BLD AUTO: 438 THOUSANDS/UL (ref 149–390)
PMV BLD AUTO: 9.5 FL (ref 8.9–12.7)
POTASSIUM SERPL-SCNC: 4.2 MMOL/L (ref 3.5–5.3)
PROT SERPL-MCNC: 7.7 G/DL (ref 6.4–8.4)
RBC # BLD AUTO: 4.14 MILLION/UL (ref 3.81–5.12)
RSV RNA RESP QL NAA+PROBE: NEGATIVE
SARS-COV-2 RNA RESP QL NAA+PROBE: NEGATIVE
SODIUM SERPL-SCNC: 137 MMOL/L (ref 135–147)
WBC # BLD AUTO: 12.98 THOUSAND/UL (ref 4.31–10.16)

## 2024-07-27 PROCEDURE — 0241U HB NFCT DS VIR RESP RNA 4 TRGT: CPT | Performed by: STUDENT IN AN ORGANIZED HEALTH CARE EDUCATION/TRAINING PROGRAM

## 2024-07-27 PROCEDURE — 96374 THER/PROPH/DIAG INJ IV PUSH: CPT

## 2024-07-27 PROCEDURE — 99285 EMERGENCY DEPT VISIT HI MDM: CPT

## 2024-07-27 PROCEDURE — 99285 EMERGENCY DEPT VISIT HI MDM: CPT | Performed by: STUDENT IN AN ORGANIZED HEALTH CARE EDUCATION/TRAINING PROGRAM

## 2024-07-27 PROCEDURE — 36415 COLL VENOUS BLD VENIPUNCTURE: CPT

## 2024-07-27 PROCEDURE — 84484 ASSAY OF TROPONIN QUANT: CPT | Performed by: STUDENT IN AN ORGANIZED HEALTH CARE EDUCATION/TRAINING PROGRAM

## 2024-07-27 PROCEDURE — 85025 COMPLETE CBC W/AUTO DIFF WBC: CPT | Performed by: STUDENT IN AN ORGANIZED HEALTH CARE EDUCATION/TRAINING PROGRAM

## 2024-07-27 PROCEDURE — 96375 TX/PRO/DX INJ NEW DRUG ADDON: CPT

## 2024-07-27 PROCEDURE — 93005 ELECTROCARDIOGRAM TRACING: CPT

## 2024-07-27 PROCEDURE — 71046 X-RAY EXAM CHEST 2 VIEWS: CPT

## 2024-07-27 PROCEDURE — 80053 COMPREHEN METABOLIC PANEL: CPT | Performed by: STUDENT IN AN ORGANIZED HEALTH CARE EDUCATION/TRAINING PROGRAM

## 2024-07-27 RX ORDER — ACETAMINOPHEN 325 MG/1
975 TABLET ORAL ONCE
Status: COMPLETED | OUTPATIENT
Start: 2024-07-27 | End: 2024-07-27

## 2024-07-27 RX ORDER — DIPHENHYDRAMINE HYDROCHLORIDE 50 MG/ML
25 INJECTION INTRAMUSCULAR; INTRAVENOUS ONCE
Status: COMPLETED | OUTPATIENT
Start: 2024-07-27 | End: 2024-07-27

## 2024-07-27 RX ORDER — METOCLOPRAMIDE HYDROCHLORIDE 5 MG/ML
10 INJECTION INTRAMUSCULAR; INTRAVENOUS ONCE
Status: COMPLETED | OUTPATIENT
Start: 2024-07-27 | End: 2024-07-27

## 2024-07-27 RX ADMIN — DIPHENHYDRAMINE HYDROCHLORIDE 25 MG: 50 INJECTION, SOLUTION INTRAMUSCULAR; INTRAVENOUS at 17:56

## 2024-07-27 RX ADMIN — ACETAMINOPHEN 975 MG: 325 TABLET, FILM COATED ORAL at 17:55

## 2024-07-27 RX ADMIN — METOCLOPRAMIDE 10 MG: 5 INJECTION, SOLUTION INTRAMUSCULAR; INTRAVENOUS at 18:00

## 2024-07-27 NOTE — DISCHARGE INSTRUCTIONS
Continue to use Tylenol and ibuprofen as needed for your tension headache.  Return here for any worsening chest discomfort, shortness of breath, or if you pass out.  Follow-up with your primary care doctor.

## 2024-07-27 NOTE — ED PROVIDER NOTES
History  Chief Complaint   Patient presents with    Chest Pain     Pt reports laying down and started having chest tightness. Report some dizziness that started two days ago. Took 3 advils without any relief.      Patient is a 33-year-old female with migraine, asthma, high blood pressure who presents with 1 day of chest pain, lightheadedness, and frontal headache.  She denies any shortness of breath.  Her chest pain is tight and is located in the upper chest.  It is tender to the touch.  She denies any reflux symptoms.  She has had similar discomfort in the past.  Antacids have tended to help in the past but she does not feel that this is reflux related.  She reports decreased oral intake.  Her daughter is sick with a viral syndrome which she is also concerned about.  She denies any fever, chills, or nasal congestion.      Chest Pain  Associated symptoms: headache    Associated symptoms: no cough, no diaphoresis, no fever, no palpitations, no shortness of breath and no weakness        Prior to Admission Medications   Prescriptions Last Dose Informant Patient Reported? Taking?   Varenicline Tartrate, Starter, (Chantix Starting Month ) 0.5 MG X 11 & 1 MG X 42 TBPK   No No   Si.5 mg once daily for 3 days then 0.5mg twice daily for days 4-7 then 1 mg twice daily   atorvastatin (LIPITOR) 10 mg tablet   Yes No   Sig: Take 10 mg by mouth daily   busPIRone (BUSPAR) 10 mg tablet   Yes No   Sig: Take 10 mg by mouth 2 (two) times a day   celecoxib (CeleBREX) 200 mg capsule   Yes No   Sig: Take 200 mg by mouth daily Take with food   eletriptan (RELPAX) 40 MG tablet   Yes No   Sig: TAKE ONE TABLET BY MOUTH AT ONSET OF MIGRAINE. IF SYMPTOMS PERSIST, A SECOND DOSE MAY BE TAKEN IN 2 HOURS. DO NOT EXCEED 2 DOSES IN A 24 HOUR PERIOD   gabapentin (NEURONTIN) 300 mg capsule   Yes No   Sig: Take 300 mg by mouth 3 (three) times a day   lisinopril (ZESTRIL) 10 mg tablet   No No   Sig: Take 1 tablet (10 mg total) by mouth daily    nortriptyline (PAMELOR) 10 mg capsule   No No   Sig: Take 1 capsule (10 mg total) by mouth daily at bedtime   propranolol (INDERAL) 40 mg tablet   No No   Sig: Take 2 tablets (80 mg total) by mouth 2 (two) times a day   tiZANidine (ZANAFLEX) 2 mg tablet   Yes No   Sig: Take 2 mg by mouth Three times a day      Facility-Administered Medications: None       Past Medical History:   Diagnosis Date    Asthma     Fibromyalgia     Hypertension     Migraine     Miscarriage     Ventral hernia        Past Surgical History:   Procedure Laterality Date     SECTION  2018    CHOLECYSTECTOMY  2012    KNEE SURGERY         Family History   Problem Relation Age of Onset    Hypertension Mother     Asthma Mother     Migraines Mother     Seizures Mother     ADD / ADHD Father     Breast cancer Sister     Colon cancer Neg Hx     Colon polyps Neg Hx      I have reviewed and agree with the history as documented.    E-Cigarette/Vaping    E-Cigarette Use Current Every Day User      E-Cigarette/Vaping Substances    Nicotine Yes     THC No     CBD No     Flavoring Yes     Other No     Unknown No      Social History     Tobacco Use    Smoking status: Every Day     Current packs/day: 1.00     Average packs/day: 1 pack/day for 20.0 years (20.0 ttl pk-yrs)     Types: Cigarettes    Smokeless tobacco: Never   Vaping Use    Vaping status: Every Day    Substances: Nicotine, Flavoring   Substance Use Topics    Alcohol use: No    Drug use: Not Currently     Types: Marijuana       Review of Systems   Constitutional:  Positive for appetite change. Negative for activity change, chills, diaphoresis and fever.   HENT: Negative.     Eyes: Negative.    Respiratory:  Positive for chest tightness. Negative for cough, shortness of breath and wheezing.    Cardiovascular:  Negative for palpitations and leg swelling.   Gastrointestinal: Negative.    Genitourinary: Negative.    Musculoskeletal: Negative.    Neurological:  Positive for light-headedness  and headaches. Negative for tremors, syncope, speech difficulty and weakness.   Hematological: Negative.    Psychiatric/Behavioral: Negative.     All other systems reviewed and are negative.      Physical Exam  Physical Exam  Vitals and nursing note reviewed.   Constitutional:       General: She is not in acute distress.     Appearance: She is well-developed. She is obese. She is not toxic-appearing.   HENT:      Head: Normocephalic and atraumatic.   Eyes:      Extraocular Movements: Extraocular movements intact.      Conjunctiva/sclera: Conjunctivae normal.      Pupils: Pupils are equal, round, and reactive to light.   Cardiovascular:      Rate and Rhythm: Normal rate and regular rhythm.      Heart sounds: No murmur heard.  Pulmonary:      Effort: Pulmonary effort is normal. No respiratory distress.      Breath sounds: Normal breath sounds.   Chest:      Chest wall: Tenderness (Tender in the bilateral upper chest near the sternum.) present. No mass.   Abdominal:      Palpations: Abdomen is soft.      Tenderness: There is no abdominal tenderness.   Musculoskeletal:         General: No swelling.      Cervical back: Neck supple.   Skin:     General: Skin is warm and dry.      Capillary Refill: Capillary refill takes less than 2 seconds.   Neurological:      Mental Status: She is alert.   Psychiatric:         Mood and Affect: Mood normal.         Vital Signs  ED Triage Vitals [07/27/24 1640]   Temperature Pulse Respirations Blood Pressure SpO2   98.8 °F (37.1 °C) 64 20 137/88 98 %      Temp Source Heart Rate Source Patient Position - Orthostatic VS BP Location FiO2 (%)   Temporal Monitor Sitting Right arm --      Pain Score       7           Vitals:    07/27/24 1640   BP: 137/88   Pulse: 64   Patient Position - Orthostatic VS: Sitting         Visual Acuity      ED Medications  Medications   metoclopramide (REGLAN) injection 10 mg (10 mg Intravenous Given 7/27/24 1800)   diphenhydrAMINE (BENADRYL) injection 25 mg (25 mg  Intravenous Given 7/27/24 1756)   acetaminophen (TYLENOL) tablet 975 mg (975 mg Oral Given 7/27/24 1755)       Diagnostic Studies  Results Reviewed       Procedure Component Value Units Date/Time    COVID/FLU/RSV [[id number]]  (Normal) Collected: 07/27/24 1646    Lab Status: Final result Specimen: Nares from Nose Updated: 07/27/24 1737     SARS-CoV-2 Negative     INFLUENZA A PCR Negative     INFLUENZA B PCR Negative     RSV PCR Negative    Narrative:      FOR PEDIATRIC PATIENTS - copy/paste COVID Guidelines URL to browser: [email or web address]    SARS-CoV-2 assay is a Nucleic Acid Amplification assay intended for the  qualitative detection of nucleic acid from SARS-CoV-2 in nasopharyngeal  swabs. Results are for the presumptive identification of SARS-CoV-2 RNA.    Positive results are indicative of infection with SARS-CoV-2, the virus  causing COVID-19, but do not rule out bacterial infection or co-infection  with other viruses. Laboratories within the [place] and its  territories are required to report all positive results to the appropriate  public health authorities. Negative results do not preclude SARS-CoV-2  infection and should not be used as the sole basis for treatment or other  patient management decisions. Negative results must be combined with  clinical observations, patient history, and epidemiological information.  This test has not been FDA cleared or approved.    This test has been authorized by FDA under an Emergency Use Authorization  (EUA). This test is only authorized for the duration of time the  declaration that circumstances exist justifying the authorization of the  emergency use of an in vitro diagnostic tests for detection of SARS-CoV-2  virus and/or diagnosis of COVID-19 infection under section 564(b)(1) of  the Act, 21 U.S.C. 360bbb-3(b)(1), unless the authorization is terminated  or revoked sooner. The test has been validated but  independent review by FDA  and CLIA is pending.    Test performed using [employer] GeneXpert: This RT-PCR assay targets N2,  a region unique to SARS-CoV-2. A conserved region in the E-gene was chosen  for pan-Sarbecovirus detection which includes SARS-CoV-2.    According to CMS-2020-01-R, this platform meets the definition of high-throughput technology.    HS Troponin 0hr (reflex protocol) [[id number]]  (Normal) Collected: 07/27/24 1646    Lab Status: Final result Specimen: Blood from Arm, Left Updated: 07/27/24 1712     hs TnI 0hr 3 ng/L     Comprehensive metabolic panel [[id number]]  (Abnormal) Collected: 07/27/24 1646    Lab Status: Final result Specimen: Blood from Arm, Left Updated: 07/27/24 1705     Sodium 137 mmol/L      Potassium 4.2 mmol/L      Chloride 103 mmol/L      CO2 27 mmol/L      ANION GAP 7 mmol/L      BUN 8 mg/dL      Creatinine 0.44 mg/dL      Glucose 86 mg/dL      Calcium 9.8 mg/dL      AST 13 U/L      ALT 19 U/L      Alkaline Phosphatase 77 U/L      Total Protein 7.7 g/dL      Albumin 4.1 g/dL      Total Bilirubin 0.34 mg/dL      eGFR 134 ml/min/1.73sq m     Narrative:      National Kidney Disease Foundation guidelines for Chronic Kidney Disease (CKD):     Stage 1 with normal or high GFR (GFR > 90 mL/min/1.73 square meters)    Stage 2 Mild CKD (GFR = 60-89 mL/min/1.73 square meters)    Stage 3A Moderate CKD (GFR = 45-59 mL/min/1.73 square meters)    Stage 3B Moderate CKD (GFR = 30-44 mL/min/1.73 square meters)    Stage 4 Severe CKD (GFR = 15-29 mL/min/1.73 square meters)    Stage 5 End Stage CKD (GFR <15 mL/min/1.73 square meters)  Note: GFR calculation is accurate only with a steady state creatinine    CBC and differential [[id number]]  (Abnormal) Collected: 07/27/24 1646    Lab Status: Final result Specimen: Blood from Arm, Left Updated: 07/27/24 1652     WBC 12.98 Thousand/uL      RBC 4.14 Million/uL      Hemoglobin 12.0 g/dL      Hematocrit 37.8 %      MCV 91 fL      MCH 29.0 pg      MCHC 31.7  g/dL      RDW 14.0 %      MPV 9.5 fL      Platelets 438 Thousands/uL      nRBC 0 /100 WBCs      Segmented % 71 %      Immature Grans % 1 %      Lymphocytes % 21 %      Monocytes % 6 %      Eosinophils Relative 1 %      Basophils Relative 0 %      Absolute Neutrophils 9.20 Thousands/µL      Absolute Immature Grans 0.09 Thousand/uL      Absolute Lymphocytes 2.68 Thousands/µL      Absolute Monocytes 0.81 Thousand/µL      Eosinophils Absolute 0.15 Thousand/µL      Basophils Absolute 0.05 Thousands/µL                    XR chest 2 views   ED Interpretation by Shyann Helm MD (07/27 1738)   PA and lateral chest x-ray was performed at 1712.  There is some rotational artifact.  Airways are midline and normal.  No acute bony abnormalities are noted.  No large lobar pneumonias noted.  Patient does have prominent vasculature in the perihilar region.                 Procedures  Procedures         ED Course  ED Course as of 07/27/24 1822   Sat Jul 27, 2024   1757 Twelve-lead EKG was performed at 1643.  Patient is in sinus rhythm with a rate of 64.  Axis is normal, intervals are normal.  There are no acute ischemic changes noted                                 SBIRT 22yo+      Flowsheet Row Most Recent Value   Initial Alcohol Screen: US AUDIT-C     1. How often do you have a drink containing alcohol? 0 Filed at: 07/27/2024 1749   2. How many drinks containing alcohol do you have on a typical day you are drinking?  0 Filed at: 07/27/2024 1749   3a. Male UNDER 65: How often do you have five or more drinks on one occasion? 0 Filed at: 07/27/2024 1749   3b. FEMALE Any Age, or MALE 65+: How often do you have 4 or more drinks on one occassion? 0 Filed at: 07/27/2024 1749   Audit-C Score 0 Filed at: 07/27/2024 1749   TRINITY: How many times in the past year have you...    Used an illegal drug or used a prescription medication for non-medical reasons? Never Filed at: 07/27/2024 1749                      Medical Decision  Making  Differential diagnoses include but are not limited to acute coronary syndrome, asthma exacerbation, traumatic injury, muscle strain, tension headache, migraine headache, acute viral syndrome    Patient is a 33-year-old female with a history of asthma, hypertension, and migraines.  She states that her headache feels different than normal migraine and her medications have not helped.  She was given a headache cocktail including Benadryl, Reglan, and Tylenol which she states Took her headache away completely.  She feels completely normal now.  Her chest pain is improved as well.  For her chest pain, her EKG was nonischemic, her troponin was normal.  The rest of her labs were all generally within normal limits.  Her viral swab was also negative.  Patient does not have any wheezing or signs of asthma exacerbation.  She also has not been having any shortness of breath.  She was tender in the pectoral region just adjacent to the sternum on the left and right sides of the chest.  She likely did some sort of uncomfortable movement or strain to the muscles in that area causing discomfort.  Her pain at this time has resolved.  There is no signs of any acute coronary syndrome.  Patient is requesting to be discharged home and at this point since I found no life-threatening etiology for her discomfort I believe that this is a safe thing to do.  She will follow-up with her primary care doctor.    Amount and/or Complexity of Data Reviewed  Labs: ordered.  Radiology: ordered and independent interpretation performed.    Risk  OTC drugs.  Prescription drug management.                 Disposition  Final diagnoses:   Atypical chest pain   Headache     Time reflects when diagnosis was documented in both MDM as applicable and the Disposition within this note       Time User Action Codes Description Comment    7/27/2024  6:19 PM Shyann Helm Add [R07.89] Atypical chest pain     7/27/2024  6:19 PM Shyann Helm Add [R51.9]  Headache           ED Disposition       ED Disposition   Discharge    Condition   Stable    Date/Time   Sat Jul 27, 2024 1819    Comment   Jennifer BLISS Elizabeth discharge to home/self care.                   Follow-up Information       Follow up With Specialties Details Why Contact Info    [facility] Schedule an appointment as soon as possible for a visit in 1 week  [address] [address]  [phone]              Patient's Medications   Discharge Prescriptions    No medications on file       No discharge procedures on file.    PDMP Review         Value Time User    PDMP Reviewed  Yes 5/29/2024  5:51 PM Dimitrios Lynch DO            ED Provider  Electronically Signed by             Shyann Helm MD  07/27/24 6267

## 2024-07-28 LAB
ATRIAL RATE: 64 BPM
P AXIS: 22 DEGREES
PR INTERVAL: 148 MS
QRS AXIS: -6 DEGREES
QRSD INTERVAL: 92 MS
QT INTERVAL: 422 MS
QTC INTERVAL: 435 MS
T WAVE AXIS: 4 DEGREES
VENTRICULAR RATE: 64 BPM

## 2024-07-28 PROCEDURE — 93010 ELECTROCARDIOGRAM REPORT: CPT | Performed by: INTERNAL MEDICINE

## 2024-07-30 ENCOUNTER — NURSE TRIAGE (OUTPATIENT)
Age: 34
End: 2024-07-30

## 2024-07-30 ENCOUNTER — HOSPITAL ENCOUNTER (EMERGENCY)
Facility: HOSPITAL | Age: 34
Discharge: HOME | End: 2024-07-31
Attending: EMERGENCY MEDICINE
Payer: COMMERCIAL

## 2024-07-30 VITALS
RESPIRATION RATE: 18 BRPM | TEMPERATURE: 97.9 F | HEART RATE: 59 BPM | DIASTOLIC BLOOD PRESSURE: 101 MMHG | BODY MASS INDEX: 55.32 KG/M2 | SYSTOLIC BLOOD PRESSURE: 154 MMHG | OXYGEN SATURATION: 100 % | WEIGHT: 293 LBS | HEIGHT: 61 IN

## 2024-07-30 DIAGNOSIS — I10 BENIGN ESSENTIAL HTN: Primary | ICD-10-CM

## 2024-07-30 DIAGNOSIS — R52 BODY ACHES: Primary | ICD-10-CM

## 2024-07-30 DIAGNOSIS — D72.829 LEUKOCYTOSIS: ICD-10-CM

## 2024-07-30 LAB
ALBUMIN SERPL BCG-MCNC: 3.8 G/DL (ref 3.5–5)
ALP SERPL-CCNC: 80 U/L (ref 34–104)
ALT SERPL W P-5'-P-CCNC: 17 U/L (ref 7–52)
ANION GAP SERPL CALCULATED.3IONS-SCNC: 7 MMOL/L (ref 4–13)
AST SERPL W P-5'-P-CCNC: 12 U/L (ref 13–39)
B-HCG SERPL-ACNC: <0.6 MIU/ML (ref 0–5)
BASOPHILS # BLD AUTO: 0.04 THOUSANDS/ÂΜL (ref 0–0.1)
BASOPHILS NFR BLD AUTO: 0 % (ref 0–1)
BILIRUB SERPL-MCNC: 0.36 MG/DL (ref 0.2–1)
BUN SERPL-MCNC: 8 MG/DL (ref 5–25)
CALCIUM SERPL-MCNC: 10 MG/DL (ref 8.4–10.2)
CHLORIDE SERPL-SCNC: 103 MMOL/L (ref 96–108)
CO2 SERPL-SCNC: 28 MMOL/L (ref 21–32)
CREAT SERPL-MCNC: 0.38 MG/DL (ref 0.6–1.3)
EOSINOPHIL # BLD AUTO: 0.16 THOUSAND/ÂΜL (ref 0–0.61)
EOSINOPHIL NFR BLD AUTO: 1 % (ref 0–6)
ERYTHROCYTE [DISTWIDTH] IN BLOOD BY AUTOMATED COUNT: 14.1 % (ref 11.6–15.1)
GFR SERPL CREATININE-BSD FRML MDRD: 141 ML/MIN/1.73SQ M
GLUCOSE SERPL-MCNC: 132 MG/DL (ref 65–140)
HCT VFR BLD AUTO: 37.6 % (ref 34.8–46.1)
HGB BLD-MCNC: 12.2 G/DL (ref 11.5–15.4)
IMM GRANULOCYTES # BLD AUTO: 0.05 THOUSAND/UL (ref 0–0.2)
IMM GRANULOCYTES NFR BLD AUTO: 0 % (ref 0–2)
LIPASE SERPL-CCNC: 9 U/L (ref 11–82)
LYMPHOCYTES # BLD AUTO: 3.02 THOUSANDS/ÂΜL (ref 0.6–4.47)
LYMPHOCYTES NFR BLD AUTO: 25 % (ref 14–44)
MCH RBC QN AUTO: 29.3 PG (ref 26.8–34.3)
MCHC RBC AUTO-ENTMCNC: 32.4 G/DL (ref 31.4–37.4)
MCV RBC AUTO: 90 FL (ref 82–98)
MONOCYTES # BLD AUTO: 0.6 THOUSAND/ÂΜL (ref 0.17–1.22)
MONOCYTES NFR BLD AUTO: 5 % (ref 4–12)
NEUTROPHILS # BLD AUTO: 8.26 THOUSANDS/ÂΜL (ref 1.85–7.62)
NEUTS SEG NFR BLD AUTO: 69 % (ref 43–75)
NRBC BLD AUTO-RTO: 0 /100 WBCS
PLATELET # BLD AUTO: 429 THOUSANDS/UL (ref 149–390)
PMV BLD AUTO: 9.4 FL (ref 8.9–12.7)
POTASSIUM SERPL-SCNC: 3.8 MMOL/L (ref 3.5–5.3)
PROT SERPL-MCNC: 7.4 G/DL (ref 6.4–8.4)
RBC # BLD AUTO: 4.16 MILLION/UL (ref 3.81–5.12)
SODIUM SERPL-SCNC: 138 MMOL/L (ref 135–147)
WBC # BLD AUTO: 12.13 THOUSAND/UL (ref 4.31–10.16)

## 2024-07-30 PROCEDURE — 99284 EMERGENCY DEPT VISIT MOD MDM: CPT

## 2024-07-30 PROCEDURE — 83690 ASSAY OF LIPASE: CPT | Performed by: EMERGENCY MEDICINE

## 2024-07-30 PROCEDURE — 85025 COMPLETE CBC W/AUTO DIFF WBC: CPT | Performed by: EMERGENCY MEDICINE

## 2024-07-30 PROCEDURE — 36415 COLL VENOUS BLD VENIPUNCTURE: CPT

## 2024-07-30 PROCEDURE — 80053 COMPREHEN METABOLIC PANEL: CPT | Performed by: EMERGENCY MEDICINE

## 2024-07-30 PROCEDURE — 84702 CHORIONIC GONADOTROPIN TEST: CPT | Performed by: EMERGENCY MEDICINE

## 2024-07-30 PROCEDURE — 99284 EMERGENCY DEPT VISIT MOD MDM: CPT | Performed by: EMERGENCY MEDICINE

## 2024-07-30 RX ORDER — ATORVASTATIN CALCIUM 10 MG/1
10 TABLET, FILM COATED ORAL DAILY
Qty: 90 TABLET | Refills: 1 | Status: SHIPPED | OUTPATIENT
Start: 2024-07-30

## 2024-07-30 NOTE — TELEPHONE ENCOUNTER
"Pt has c/o abdominal pain and rates it 7/10. Pt says when she eats anything all day it will shoot up to a 10/10 pain. Pt does report vomiting and cannot keep anything down. Pt was recently just in ED 2 days ago for this and chest pain. Pt also reports the chest pain and SOB as coming and going.     Recommended to go to the ED to be evaluated d/t not being able to eat or drink anything. Pt agreed and said she would call  and see if he can leave work early to take her if not it would be after 11 pm when she could get there. Pt agreed she would go.             Reason for Disposition   Constant abdominal pain lasting > 2 hours    Answer Assessment - Initial Assessment Questions  1. LOCATION: \"Where does it hurt?\"       Whole stomach hurts   2. RADIATION: \"Does the pain shoot anywhere else?\" (e.g., chest, back)      Denies   3. ONSET: \"When did the pain begin?\" (e.g., minutes, hours or days ago)       Since may, but has gotten worse this month   4. SUDDEN: \"Gradual or sudden onset?\"      Gradually   5. PATTERN \"Does the pain come and go, or is it constant?\"     - If constant: \"Is it getting better, staying the same, or worsening?\"       (Note: Constant means the pain never goes away completely; most serious pain is constant and it progresses)      - If intermittent: \"How long does it last?\" \"Do you have pain now?\"      (Note: Intermittent means the pain goes away completely between bouts)      Constant now.   6. SEVERITY: \"How bad is the pain?\"  (e.g., Scale 1-10; mild, moderate, or severe)    - MILD (1-3): doesn't interfere with normal activities, abdomen soft and not tender to touch     - MODERATE (4-7): interferes with normal activities or awakens from sleep, tender to touch     - SEVERE (8-10): excruciating pain, doubled over, unable to do any normal activities       7/10. If eats something the pain goes to a 10   7. RECURRENT SYMPTOM: \"Have you ever had this type of stomach pain before?\" If Yes, ask: \"When " "was the last time?\" and \"What happened that time?\"       Denies   8. CAUSE: \"What do you think is causing the stomach pain?\"      Unsure but WBC count is high and so is plts. Pt thinks it might be cancer   9. RELIEVING/AGGRAVATING FACTORS: \"What makes it better or worse?\" (e.g., movement, antacids, bowel movement)      Drinking tea makes it better to a point where pt can eat a little something   10. OTHER SYMPTOMS: \"Has there been any vomiting, diarrhea, constipation, or urine problems?\"        Vomiting, constipation yesterday but normally is fine.   11. PREGNANCY: \"Is there any chance you are pregnant?\" \"When was your last menstrual period?\"        Denies    Protocols used: Abdominal Pain - Female-ADULT-[place]    "

## 2024-07-31 ENCOUNTER — OFFICE VISIT (OUTPATIENT)
Dept: FAMILY MEDICINE CLINIC | Facility: CLINIC | Age: 34
End: 2024-07-31
Payer: COMMERCIAL

## 2024-07-31 ENCOUNTER — TELEPHONE (OUTPATIENT)
Age: 34
End: 2024-07-31

## 2024-07-31 VITALS — DIASTOLIC BLOOD PRESSURE: 92 MMHG | SYSTOLIC BLOOD PRESSURE: 150 MMHG

## 2024-07-31 DIAGNOSIS — D72.829 LEUKOCYTOSIS, UNSPECIFIED TYPE: Primary | ICD-10-CM

## 2024-07-31 DIAGNOSIS — I10 BENIGN ESSENTIAL HTN: ICD-10-CM

## 2024-07-31 LAB
BILIRUB UR QL STRIP: NEGATIVE
CLARITY UR: CLEAR
COLOR UR: YELLOW
GLUCOSE UR STRIP-MCNC: NEGATIVE MG/DL
HGB UR QL STRIP.AUTO: NEGATIVE
HOLD SPECIMEN: NORMAL
KETONES UR STRIP-MCNC: NEGATIVE MG/DL
LEUKOCYTE ESTERASE UR QL STRIP: NEGATIVE
NITRITE UR QL STRIP: NEGATIVE
PH UR STRIP.AUTO: 6.5 [PH]
PROT UR STRIP-MCNC: NEGATIVE MG/DL
SP GR UR STRIP.AUTO: 1.01 (ref 1–1.03)
UROBILINOGEN UR STRIP-ACNC: <2 MG/DL

## 2024-07-31 PROCEDURE — 99213 OFFICE O/P EST LOW 20 MIN: CPT | Performed by: NURSE PRACTITIONER

## 2024-07-31 PROCEDURE — 3080F DIAST BP >= 90 MM HG: CPT | Performed by: NURSE PRACTITIONER

## 2024-07-31 PROCEDURE — 81003 URINALYSIS AUTO W/O SCOPE: CPT | Performed by: EMERGENCY MEDICINE

## 2024-07-31 PROCEDURE — 3077F SYST BP >= 140 MM HG: CPT | Performed by: NURSE PRACTITIONER

## 2024-07-31 NOTE — PROGRESS NOTES
Ambulatory Visit  Name: Jennifer Johnson      : 1990      MRN: [id number]  Encounter Provider: [facility]  Encounter Date: 2024   Encounter department: [facility]    Assessment & Plan   1. Leukocytosis, unspecified type  -     Ambulatory Referral to Infectious Disease; Future  2. Benign essential HTN  Assessment & Plan:  BP was elevated today. Patient states BP is normal at home. Taking Lisinopril and propanolol. Instructed to monitor BP twice a day, record readings and return in 2 weeks for recheck.        History of Present Illness     F/up ED.  33 year old female with pmh fibromyalgia, asthma, HTN, migraines, cholecystectomy seen in ED yesterday for c/o persistent headache, chest pain, abd pain, n/v.  EKG showed no significant change from previous. Patient states she is feeling a little better today. Labs showed mild elevated WBC.  Patient has chronic mild elevation. She was referred to ID for evaluation. BP elevated today 150/92-taking Lisinopril and takes propanolol for headaches. She states BP has been normal at home and ranging 101-130s/70s-80s.         Review of Systems   Constitutional:  Negative for activity change and fever.   Respiratory:  Negative for chest tightness, shortness of breath and wheezing.    Cardiovascular:  Negative for chest pain.   Genitourinary:  Negative for difficulty urinating.   Neurological:  Positive for headaches.   Psychiatric/Behavioral:  Negative for dysphoric mood.        Objective   /92 (BP Location: Right arm, Patient Position: Sitting, Cuff Size: Thigh)   LMP 2024 (Exact Date)     Physical Exam  Vitals and nursing note reviewed.   Constitutional:       General: She is not in acute distress.     Appearance: Normal appearance. She is obese. She is not ill-appearing.   HENT:      Head: Normocephalic.   Eyes:      Extraocular Movements: Extraocular movements intact.   Cardiovascular:      Rate and Rhythm:  Normal rate and regular rhythm.      Heart sounds: Normal heart sounds.   Pulmonary:      Effort: Pulmonary effort is normal. No respiratory distress.      Breath sounds: Normal breath sounds. No wheezing.   Musculoskeletal:         General: Normal range of motion.   Skin:     General: Skin is warm and dry.      Coloration: Skin is not pale.      Findings: No erythema.   Neurological:      Mental Status: She is alert and oriented to person, place, and time.   Psychiatric:         Mood and Affect: Mood normal.         Behavior: Behavior normal.         Thought Content: Thought content normal.         Judgment: Judgment normal.

## 2024-07-31 NOTE — TELEPHONE ENCOUNTER
Pt calling to schedule ID consult per referral. Advised that referral was placed but no information is available yet from PCP since pt just left her visit with her. Informed that we can call her back to discuss the referral further once we have all of the information from the referring provider. Pt frustrated that she can not schedule appt at this moment and the call was disconnected.

## 2024-07-31 NOTE — TELEPHONE ENCOUNTER
Patient needs to go to ED, especially with complaints of chest pain, if no transportation can call EMT/911.

## 2024-07-31 NOTE — ED PROVIDER NOTES
History  Chief Complaint   Patient presents with    Abdominal Pain     Pt c/o abdominal pain, n/v, headache. Pt took advil at noon. Pt seen here a few days ago for same     Hx from patient and medical records - pmh fibromyalgia, asthma, HTN, migraines, cholecystectomy c/o persistent headache, chest pain, abd pain, n/v.  Took advil that helped.  Was in ER few days ago for similar symptoms and felt better but symptoms returned shortly after discharge from ER.  Patient has chronic mild elevation in serum WBC - she denies tick bite or rash.        Prior to Admission Medications   Prescriptions Last Dose Informant Patient Reported? Taking?   Varenicline Tartrate, Starter, (Chantix Starting Month ) 0.5 MG X 11 & 1 MG X 42 TBPK   No No   Si.5 mg once daily for 3 days then 0.5mg twice daily for days 4-7 then 1 mg twice daily   atorvastatin (LIPITOR) 10 mg tablet   No No   Sig: Take 1 tablet by mouth once daily   busPIRone (BUSPAR) 10 mg tablet   Yes No   Sig: Take 10 mg by mouth 2 (two) times a day   celecoxib (CeleBREX) 200 mg capsule   Yes No   Sig: Take 200 mg by mouth daily Take with food   eletriptan (RELPAX) 40 MG tablet   Yes No   Sig: TAKE ONE TABLET BY MOUTH AT ONSET OF MIGRAINE. IF SYMPTOMS PERSIST, A SECOND DOSE MAY BE TAKEN IN 2 HOURS. DO NOT EXCEED 2 DOSES IN A 24 HOUR PERIOD   gabapentin (NEURONTIN) 300 mg capsule   Yes No   Sig: Take 300 mg by mouth 3 (three) times a day   lisinopril (ZESTRIL) 10 mg tablet   No No   Sig: Take 1 tablet (10 mg total) by mouth daily   nortriptyline (PAMELOR) 10 mg capsule   No No   Sig: Take 1 capsule (10 mg total) by mouth daily at bedtime   propranolol (INDERAL) 40 mg tablet   No No   Sig: Take 2 tablets (80 mg total) by mouth 2 (two) times a day   tiZANidine (ZANAFLEX) 2 mg tablet   Yes No   Sig: Take 2 mg by mouth Three times a day      Facility-Administered Medications: None       Past Medical History:   Diagnosis Date    Asthma     Fibromyalgia     Hypertension      Migraine     Miscarriage     Ventral hernia        Past Surgical History:   Procedure Laterality Date     SECTION  2018    CHOLECYSTECTOMY  2012    KNEE SURGERY         Family History   Problem Relation Age of Onset    Hypertension Mother     Asthma Mother     Migraines Mother     Seizures Mother     ADD / ADHD Father     Breast cancer Sister     Colon cancer Neg Hx     Colon polyps Neg Hx      I have reviewed and agree with the history as documented.    E-Cigarette/Vaping    E-Cigarette Use Current Every Day User      E-Cigarette/Vaping Substances    Nicotine Yes     THC No     CBD No     Flavoring Yes     Other No     Unknown No      Social History     Tobacco Use    Smoking status: Every Day     Current packs/day: 1.00     Average packs/day: 1 pack/day for 20.0 years (20.0 ttl pk-yrs)     Types: Cigarettes    Smokeless tobacco: Never   Vaping Use    Vaping status: Every Day    Substances: Nicotine, Flavoring   Substance Use Topics    Alcohol use: No    Drug use: Not Currently     Types: Marijuana       Review of Systems   Constitutional:  Negative for chills and fever.   HENT:  Negative for rhinorrhea and sore throat.    Respiratory:  Positive for shortness of breath.    Cardiovascular:  Positive for chest pain.   Gastrointestinal:  Positive for abdominal pain, nausea and vomiting. Negative for constipation and diarrhea.   Genitourinary:  Negative for dysuria and frequency.   Skin:  Negative for rash.   All other systems reviewed and are negative.      Physical Exam  Physical Exam  Vitals and nursing note reviewed.   Constitutional:       Appearance: She is well-developed. She is obese.   HENT:      Head: Normocephalic and atraumatic.      Right Ear: Tympanic membrane and external ear normal.      Left Ear: Tympanic membrane and external ear normal.      Nose: Nose normal.      Mouth/Throat:      Mouth: Mucous membranes are moist.      Pharynx: Oropharynx is clear.   Eyes:      Conjunctiva/sclera:  Conjunctivae normal.      Pupils: Pupils are equal, round, and reactive to light.   Cardiovascular:      Rate and Rhythm: Normal rate and regular rhythm.      Heart sounds: Normal heart sounds.   Pulmonary:      Effort: Pulmonary effort is normal. No respiratory distress.      Breath sounds: Normal breath sounds. No wheezing.   Abdominal:      General: Bowel sounds are normal. There is no distension.      Palpations: Abdomen is soft.      Tenderness: There is no abdominal tenderness.   Musculoskeletal:         General: No deformity. Normal range of motion.      Cervical back: Normal range of motion and neck supple. No spinous process tenderness.   Skin:     General: Skin is warm and dry.      Findings: No rash.   Neurological:      General: No focal deficit present.      Mental Status: She is alert.      GCS: GCS eye subscore is 4. GCS verbal subscore is 5. GCS motor subscore is 6.      Sensory: No sensory deficit.   Psychiatric:         Mood and Affect: Mood normal.         Vital Signs  ED Triage Vitals [07/30/24 2317]   Temperature Pulse Respirations Blood Pressure SpO2   97.9 °F (36.6 °C) 59 18 (!) 154/101 100 %      Temp Source Heart Rate Source Patient Position - Orthostatic VS BP Location FiO2 (%)   Oral Monitor Sitting Right arm --      Pain Score       7           Vitals:    07/30/24 2317   BP: (!) 154/101   Pulse: 59   Patient Position - Orthostatic VS: Sitting         Visual Acuity      ED Medications  Medications - No data to display    Diagnostic Studies  Results Reviewed       Procedure Component Value Units Date/Time    [employer] draw [[id number]] Collected: 07/30/24 2322    Lab Status: Final result Specimen: Blood from Arm, Left Updated: 07/31/24 0101    Narrative:      The following orders were created for panel order [employer] draw.  Procedure                               Abnormality         Status                     ---------                               -----------         ------                      Light Blue Top on hold[[id number]]                           Final result               Gold top on hold[[id number]]                                 Final result               Green / Black tube on hold[[id number]]                       Final result                 Please view results for these tests on the individual orders.    UA w Reflex to Microscopic w Reflex to Culture [[id number]] Collected: 07/31/24 0032    Lab Status: Final result Specimen: Urine, Clean Catch Updated: 07/31/24 0050     Color, UA Yellow     Clarity, UA Clear     Specific Gravity, UA 1.015     pH, UA 6.5     Leukocytes, UA Negative     Nitrite, UA Negative     Protein, UA Negative mg/dl      Glucose, UA Negative mg/dl      Ketones, UA Negative mg/dl      Urobilinogen, UA <2.0 mg/dl      Bilirubin, UA Negative     Occult Blood, UA Negative    hCG, quantitative [[id number]]  (Normal) Collected: 07/30/24 2322    Lab Status: Final result Specimen: Blood from Arm, Left Updated: 07/30/24 4789     HCG, Quant <0.6 mIU/mL     Narrative:       Expected Ranges:    HCG results between 5.0 and 25.0 mIU/mL may be indicative of early pregnancy but should be interpreted in light of the total clinical presentation.    HCG can rise to detectable levels in red and post menopausal women (0-11.6 mIU/mL).     Approximate               Approximate HCG  Gestation age          Concentration ( mIU/mL)  _____________          ______________________   Weeks                      HCG values  0.2-1                       5-50  1-2                           2-3                         100-5000  3-4                         500-19633  4-5                         1000-08963  5-6                         [id number]  6-8                         [id number]  8-12                        [id number]      Comprehensive metabolic panel [[id number]]  (Abnormal) Collected: 07/30/24 2322    Lab Status: Final result Specimen: Blood from Arm, Left Updated: 07/30/24 2349     Sodium  138 mmol/L      Potassium 3.8 mmol/L      Chloride 103 mmol/L      CO2 28 mmol/L      ANION GAP 7 mmol/L      BUN 8 mg/dL      Creatinine 0.38 mg/dL      Glucose 132 mg/dL      Calcium 10.0 mg/dL      AST 12 U/L      ALT 17 U/L      Alkaline Phosphatase 80 U/L      Total Protein 7.4 g/dL      Albumin 3.8 g/dL      Total Bilirubin 0.36 mg/dL      eGFR 141 ml/min/1.73sq m     Narrative:      National Kidney Disease Foundation guidelines for Chronic Kidney Disease (CKD):     Stage 1 with normal or high GFR (GFR > 90 mL/min/1.73 square meters)    Stage 2 Mild CKD (GFR = 60-89 mL/min/1.73 square meters)    Stage 3A Moderate CKD (GFR = 45-59 mL/min/1.73 square meters)    Stage 3B Moderate CKD (GFR = 30-44 mL/min/1.73 square meters)    Stage 4 Severe CKD (GFR = 15-29 mL/min/1.73 square meters)    Stage 5 End Stage CKD (GFR <15 mL/min/1.73 square meters)  Note: GFR calculation is accurate only with a steady state creatinine    Lipase [[id number]]  (Abnormal) Collected: 07/30/24 2322    Lab Status: Final result Specimen: Blood from Arm, Left Updated: 07/30/24 2349     Lipase 9 u/L     CBC and differential [[id number]]  (Abnormal) Collected: 07/30/24 2322    Lab Status: Final result Specimen: Blood from Arm, Left Updated: 07/30/24 2332     WBC 12.13 Thousand/uL      RBC 4.16 Million/uL      Hemoglobin 12.2 g/dL      Hematocrit 37.6 %      MCV 90 fL      MCH 29.3 pg      MCHC 32.4 g/dL      RDW 14.1 %      MPV 9.4 fL      Platelets 429 Thousands/uL      nRBC 0 /100 WBCs      Segmented % 69 %      Immature Grans % 0 %      Lymphocytes % 25 %      Monocytes % 5 %      Eosinophils Relative 1 %      Basophils Relative 0 %      Absolute Neutrophils 8.26 Thousands/µL      Absolute Immature Grans 0.05 Thousand/uL      Absolute Lymphocytes 3.02 Thousands/µL      Absolute Monocytes 0.60 Thousand/µL      Eosinophils Absolute 0.16 Thousand/µL      Basophils Absolute 0.04 Thousands/µL                    No orders to display               Procedures  Procedures         ED Course     stable, no distress in ER - no vomiting.  She states her BP is always high in ER and improves at home.                            SBIRT 20yo+      Flowsheet Row Most Recent Value   Initial Alcohol Screen: US AUDIT-C     1. How often do you have a drink containing alcohol? 0 Filed at: 07/30/2024 2320   2. How many drinks containing alcohol do you have on a typical day you are drinking?  0 Filed at: 07/30/2024 2320   3a. Male UNDER 65: How often do you have five or more drinks on one occasion? 0 Filed at: 07/30/2024 2320   3b. FEMALE Any Age, or MALE 65+: How often do you have 4 or more drinks on one occassion? 0 Filed at: 07/30/2024 2320   Audit-C Score 0 Filed at: 07/30/2024 2320   TRINITY: How many times in the past year have you...    Used an illegal drug or used a prescription medication for non-medical reasons? Never Filed at: 07/30/2024 2320                      Medical Decision Making  Diff includes viral syndrome, fibromyalgia, dissociative disorder, less likely coronary syndrome, pneumonia, biliary colic, gastroparesis.  May need outpatient cytology studies for chronic leukocytosis.    Amount and/or Complexity of Data Reviewed  Labs: ordered.                 Disposition  Final diagnoses:   Body aches   Leukocytosis     Time reflects when diagnosis was documented in both MDM as applicable and the Disposition within this note       Time User Action Codes Description Comment    7/31/2024 12:52 AM Andres Mark [R52] Body aches     7/31/2024 12:53 AM Andres Mark [D72.829] Leukocytosis           ED Disposition       ED Disposition   Discharge    Condition   Stable    Date/Time   Wed Jul 31, 2024 0052    Comment   Jennifer Johnson discharge to home/self care.                   Follow-up Information       Follow up With Specialties Details Why Contact Info    [facility]   [address]  [address]  [facility] [address] [phone]       Ivonne Castillo MD Infectious Diseases Schedule an appointment as soon as possible for a visit   [address] [place] [address]  [phone]              Discharge Medication List as of 7/31/2024 12:54 AM        CONTINUE these medications which have NOT CHANGED    Details   atorvastatin (LIPITOR) 10 mg tablet Take 1 tablet by mouth once daily, Starting Tue 7/30/2024, Normal      busPIRone (BUSPAR) 10 mg tablet Take 10 mg by mouth 2 (two) times a day, Starting Fri 5/31/2024, Historical Med      celecoxib (CeleBREX) 200 mg capsule Take 200 mg by mouth daily Take with food, Starting Fri 7/19/2024, Historical Med      eletriptan (RELPAX) 40 MG tablet TAKE ONE TABLET BY MOUTH AT ONSET OF MIGRAINE. IF SYMPTOMS PERSIST, A SECOND DOSE MAY BE TAKEN IN 2 HOURS. DO NOT EXCEED 2 DOSES IN A 24 HOUR PERIOD, Historical Med      gabapentin (NEURONTIN) 300 mg capsule Take 300 mg by mouth 3 (three) times a day, Starting Thu 7/18/2024, Historical Med      lisinopril (ZESTRIL) 10 mg tablet Take 1 tablet (10 mg total) by mouth daily, Starting Thu 7/25/2024, Normal      nortriptyline (PAMELOR) 10 mg capsule Take 1 capsule (10 mg total) by mouth daily at bedtime, Starting Wed 5/15/2024, Normal      propranolol (INDERAL) 40 mg tablet Take 2 tablets (80 mg total) by mouth 2 (two) times a day, Starting Thu 7/25/2024, Print      tiZANidine (ZANAFLEX) 2 mg tablet Take 2 mg by mouth Three times a day, Starting Sat 7/6/2024, Historical Med      Varenicline Tartrate, Starter, (Chantix Starting Month Pak) 0.5 MG X 11 & 1 MG X 42 TBPK 0.5 mg once daily for 3 days then 0.5mg twice daily for days 4-7 then 1 mg twice daily, Normal             No discharge procedures on file.    PDMP Review         Value Time User    PDMP Reviewed  Yes 5/29/2024  5:51 PM Dimitrios Lynch DO            ED Provider  Electronically Signed by             Andres Mark DO  07/31/24 0159

## 2024-07-31 NOTE — PATIENT INSTRUCTIONS
Continue current medications.  Your BP was elevated today.Continue Lisinopril and propanolol. Monitor BP twice a day and return in 2 weeks for recheck.   F/up with ID for chronic elevated WBCs

## 2024-07-31 NOTE — ASSESSMENT & PLAN NOTE
BP was elevated today. Patient states BP is normal at home. Taking Lisinopril and propanolol. Instructed to monitor BP twice a day, record readings and return in 2 weeks for recheck.

## 2024-07-31 NOTE — TELEPHONE ENCOUNTER
Patient calling to schedule appointment with infectious disease.  Patent was advised a referral would be needed from a physician, patient stated she is seeing her PCP today.  Fax number provided

## 2024-08-01 ENCOUNTER — TELEPHONE (OUTPATIENT)
Age: 34
End: 2024-08-01

## 2024-08-01 NOTE — TELEPHONE ENCOUNTER
ID Referral     Notified patient she needs to call her PCP regarding BC x2 lab. Pt needs to complete BC x2, ID needs results in order to evaluate before approving referral.     Also advised elevated WBC may not be infectious related, could also mean she needs to see Hematology.     Pt disconnected call.

## 2024-08-01 NOTE — TELEPHONE ENCOUNTER
ID Referral      Notified patient she needs to call her PCP regarding BC x2 lab. Pt needs to complete BC x2, ID needs results in order to evaluate before approving referral.      Also advised elevated WBC may not be infectious related, could also mean she needs to see Hematology.      Pt disconnected call.        Please be advised, patient is very upset, she can not even schedule the apt with ID, till these labs have been ordered by pcp and resulted, and even then depending on the results patients reports ID informed her that they may not see her, and she might need referral to hematology. Patient is very upset with the system at this point, can we please follow up with patient after provider places these orders.

## 2024-08-02 DIAGNOSIS — D72.829 LEUKOCYTOSIS, UNSPECIFIED TYPE: Primary | ICD-10-CM

## 2024-08-02 NOTE — TELEPHONE ENCOUNTER
Spoke with patient, advised patient of Yenifer Message, she states she will come in on Monday and hung up.

## 2024-08-08 ENCOUNTER — TELEPHONE (OUTPATIENT)
Dept: BARIATRICS | Facility: CLINIC | Age: 34
End: 2024-08-08

## 2024-08-08 ENCOUNTER — TELEPHONE (OUTPATIENT)
Age: 34
End: 2024-08-08

## 2024-08-13 ENCOUNTER — OFFICE VISIT (OUTPATIENT)
Dept: FAMILY MEDICINE CLINIC | Facility: CLINIC | Age: 34
End: 2024-08-13
Payer: COMMERCIAL

## 2024-08-13 VITALS — OXYGEN SATURATION: 96 % | DIASTOLIC BLOOD PRESSURE: 74 MMHG | HEART RATE: 57 BPM | SYSTOLIC BLOOD PRESSURE: 106 MMHG

## 2024-08-13 DIAGNOSIS — G43.009 MIGRAINE WITHOUT AURA AND WITHOUT STATUS MIGRAINOSUS, NOT INTRACTABLE: Primary | ICD-10-CM

## 2024-08-13 PROCEDURE — 99214 OFFICE O/P EST MOD 30 MIN: CPT | Performed by: FAMILY MEDICINE

## 2024-08-13 NOTE — PROGRESS NOTES
Assessment/Plan:    Migraine without status migrainosus, not intractable  Stable on propran----BP low off lisin     Diagnoses and all orders for this visit:    Migraine without aura and without status migrainosus, not intractable          Subjective:   Chief Complaint   Patient presents with    Blood Pressure Check    Follow-up        Patient ID: Jennifer Johnson is a 33 y.o. female.    HPI    The following portions of the patient's history were reviewed and updated as appropriate: allergies, current medications, past family history, past medical history, past social history, past surgical history and problem list.    Review of Systems   Constitutional:  Negative for fatigue, fever and unexpected weight change.   HENT:  Negative for congestion, sinus pain and sore throat.    Eyes:  Negative for visual disturbance.   Respiratory:  Negative for shortness of breath and wheezing.    Cardiovascular:  Negative for chest pain and palpitations.   Gastrointestinal:  Negative for abdominal pain, nausea and vomiting.   Musculoskeletal: Negative.  Negative for arthralgias and myalgias.   Neurological:  Negative for syncope, weakness and numbness.   Psychiatric/Behavioral: Negative.  Negative for confusion, dysphoric mood and suicidal ideas.          Objective:  Vitals:    08/13/24 0750   BP: 106/74   BP Location: Left arm   Patient Position: Sitting   Cuff Size: Standard   Pulse: 57   SpO2: 96%      Physical Exam

## 2024-08-14 ENCOUNTER — TELEPHONE (OUTPATIENT)
Age: 34
End: 2024-08-14

## 2024-08-14 DIAGNOSIS — R52 PAIN: Primary | ICD-10-CM

## 2024-08-14 DIAGNOSIS — F17.210 CIGARETTE NICOTINE DEPENDENCE WITHOUT COMPLICATION: ICD-10-CM

## 2024-08-14 RX ORDER — VARENICLINE TARTRATE 0.5 (11)-1
KIT ORAL
Qty: 53 EACH | Refills: 0 | Status: CANCELLED | OUTPATIENT
Start: 2024-08-14

## 2024-08-14 RX ORDER — TIZANIDINE 2 MG/1
2 TABLET ORAL
Refills: 0 | Status: CANCELLED | OUTPATIENT
Start: 2024-08-14

## 2024-08-14 NOTE — TELEPHONE ENCOUNTER
Patient calling requesting lab work to recheck her cholesterol levels.      Please review.  Thank you

## 2024-08-15 ENCOUNTER — TELEPHONE (OUTPATIENT)
Dept: PSYCHIATRY | Facility: CLINIC | Age: 34
End: 2024-08-15

## 2024-08-15 NOTE — TELEPHONE ENCOUNTER
Writer called and left message for client to schedule an appointment with May Tamez. Writer left client scheduling line number to call back.     May would like to see client either the week of 8/19 or the next week if possible.    Client advised if emergency to go to emergency room.

## 2024-08-16 DIAGNOSIS — F17.210 CIGARETTE NICOTINE DEPENDENCE WITHOUT COMPLICATION: Primary | ICD-10-CM

## 2024-08-16 RX ORDER — GABAPENTIN 300 MG/1
300 CAPSULE ORAL 3 TIMES DAILY
Qty: 90 CAPSULE | Refills: 5 | Status: SHIPPED | OUTPATIENT
Start: 2024-08-16

## 2024-08-16 RX ORDER — VARENICLINE TARTRATE 1 MG/1
1 TABLET, FILM COATED ORAL 2 TIMES DAILY
Qty: 60 TABLET | Refills: 1 | Status: SHIPPED | OUTPATIENT
Start: 2024-08-16

## 2024-08-28 ENCOUNTER — APPOINTMENT (OUTPATIENT)
Dept: RADIOLOGY | Facility: CLINIC | Age: 34
End: 2024-08-28
Payer: COMMERCIAL

## 2024-08-28 ENCOUNTER — OFFICE VISIT (OUTPATIENT)
Dept: URGENT CARE | Facility: CLINIC | Age: 34
End: 2024-08-28
Payer: COMMERCIAL

## 2024-08-28 VITALS
RESPIRATION RATE: 16 BRPM | HEART RATE: 65 BPM | DIASTOLIC BLOOD PRESSURE: 70 MMHG | TEMPERATURE: 98.8 F | OXYGEN SATURATION: 98 % | SYSTOLIC BLOOD PRESSURE: 126 MMHG

## 2024-08-28 DIAGNOSIS — M79.642 BILATERAL HAND PAIN: Primary | ICD-10-CM

## 2024-08-28 DIAGNOSIS — M79.641 BILATERAL HAND PAIN: Primary | ICD-10-CM

## 2024-08-28 DIAGNOSIS — M79.642 BILATERAL HAND PAIN: ICD-10-CM

## 2024-08-28 DIAGNOSIS — M79.641 BILATERAL HAND PAIN: ICD-10-CM

## 2024-08-28 PROCEDURE — 99213 OFFICE O/P EST LOW 20 MIN: CPT

## 2024-08-28 PROCEDURE — 73140 X-RAY EXAM OF FINGER(S): CPT

## 2024-08-28 NOTE — PATIENT INSTRUCTIONS
Your x-rays were read by the provider. A radiologist will also read the x-rays and you will be notified of any abnormalities.     Call to schedule follow-up appointment with pain management.    Go to the ED for any worsening symptoms.

## 2024-08-28 NOTE — PROGRESS NOTES
[facility]        NAME: Jennifer Johnson is a 34 y.o. female  : 1990    MRN: [id number]  DATE: 2024  TIME: 3:12 PM    Assessment and Plan   Bilateral hand pain [M79.641, M79.642]  1. Bilateral hand pain  XR finger right second digit-index    XR finger left second digit-index            Patient Instructions     Your x-rays were read by the provider. A radiologist will also read the x-rays and you will be notified of any abnormalities.     Call to schedule follow-up appointment with pain management.    Go to the ED for any worsening symptoms.    If tests are performed, our office will contact you with results only if changes need to made to the care plan discussed with you at the visit. [facility]      Chief Complaint     Chief Complaint   Patient presents with    Swelling     Pt presents with bilateral index finger swelling and pain especially with flexion x 1 week.  Pt states swelling and pain have gotten worse since onset.           History of Present Illness       34-year-old female presenting with bilateral index finger pain x 1 week. Patient believes they are swollen and reports limited flexion due to pain. She denies known injury or incident prior to symptom onset. Follows with rheumatology and pain management for fibromyalgia, arthritis, and chronic pain syndrome. States she has been compliant with her medication regimen. Supplementing with Tylenol. Requesting x-rays to rule out fractures.        Review of Systems   Review of Systems   Constitutional:  Negative for fever.   Respiratory:  Negative for shortness of breath.    Cardiovascular:  Negative for chest pain.   Gastrointestinal:  Negative for abdominal pain.   Musculoskeletal:  Positive for arthralgias and myalgias.   Skin:  Negative for wound.   Neurological:  Negative for headaches.         Current Medications       Current Outpatient Medications:     atorvastatin (LIPITOR) 10 mg  tablet, Take 1 tablet by mouth once daily, Disp: 90 tablet, Rfl: 1    CeleBREX 200 MG capsule, every 24 hours, Disp: , Rfl:     eletriptan (RELPAX) 40 MG tablet, TAKE ONE TABLET BY MOUTH AT ONSET OF MIGRAINE. IF SYMPTOMS PERSIST, A SECOND DOSE MAY BE TAKEN IN 2 HOURS. DO NOT EXCEED 2 DOSES IN A 24 HOUR PERIOD, Disp: , Rfl:     gabapentin (NEURONTIN) 300 mg capsule, Take 1 capsule (300 mg total) by mouth 3 (three) times a day, Disp: 90 capsule, Rfl: 5    nortriptyline (PAMELOR) 10 mg capsule, Take 1 capsule (10 mg total) by mouth daily at bedtime, Disp: 30 capsule, Rfl: 6    propranolol (INDERAL) 40 mg tablet, Take 2 tablets (80 mg total) by mouth 2 (two) times a day, Disp: 360 tablet, Rfl: 3    tiZANidine (ZANAFLEX) 2 mg tablet, Take 2 mg by mouth Three times a day, Disp: , Rfl:     varenicline (Chantix Continuing Month Jorje) 1 mg tablet, Take 1 tablet (1 mg total) by mouth 2 (two) times a day, Disp: 60 tablet, Rfl: 1    Current Allergies     Allergies as of 2024 - Reviewed 2024   Allergen Reaction Noted    Naproxen Other (See Comments) 2024            The following portions of the patient's history were reviewed and updated as appropriate: allergies, current medications, past family history, past medical history, past social history, past surgical history and problem list.     Past Medical History:   Diagnosis Date    Asthma     Fibromyalgia     Hypertension     Migraine     Miscarriage     Ventral hernia        Past Surgical History:   Procedure Laterality Date     SECTION  2018    CHOLECYSTECTOMY  2012    KNEE SURGERY         Family History   Problem Relation Age of Onset    Hypertension Mother     Asthma Mother     Migraines Mother     Seizures Mother     ADD / ADHD Father     Breast cancer Sister     Colon cancer Neg Hx     Colon polyps Neg Hx          Medications have been verified.        Objective   /70   Pulse 65   Temp 98.8 °F (37.1 °C)   Resp 16   LMP 2024  (Exact Date)   SpO2 98%        Physical Exam     Physical Exam  Vitals and nursing note reviewed.   Constitutional:       General: She is not in acute distress.     Appearance: She is obese. She is not ill-appearing.   HENT:      Head: Normocephalic and atraumatic.      Mouth/Throat:      Mouth: Mucous membranes are moist.   Cardiovascular:      Rate and Rhythm: Normal rate and regular rhythm.      Pulses: Normal pulses.      Heart sounds: Normal heart sounds.   Pulmonary:      Effort: Pulmonary effort is normal.      Breath sounds: Normal breath sounds.   Musculoskeletal:         General: Normal range of motion.      Right forearm: Normal.      Left forearm: Normal.      Right wrist: Normal.      Left wrist: Normal.      Right hand: Tenderness (index finger, generalized) present. No swelling. Normal range of motion. Normal strength. Normal sensation. Normal capillary refill. Normal pulse.      Left hand: Tenderness (index finger, PIP joint specifically) present. No swelling. Normal range of motion. Normal strength. Normal sensation. Normal capillary refill. Normal pulse.      Cervical back: Normal range of motion and neck supple.      Comments: Patient able to make a complete fist with both hands. Full passive flexion of all fingers without verbalized pain.   Skin:     General: Skin is warm and dry.      Capillary Refill: Capillary refill takes less than 2 seconds.   Neurological:      Mental Status: She is alert and oriented to person, place, and time.

## 2024-09-05 ENCOUNTER — NURSE TRIAGE (OUTPATIENT)
Dept: OTHER | Facility: OTHER | Age: 34
End: 2024-09-05

## 2024-09-05 NOTE — TELEPHONE ENCOUNTER
"Reason for Disposition   Unable to walk    Answer Assessment - Initial Assessment Questions  1. ONSET: \"When did the pain start?\"       Last night    2. LOCATION: \"Where is the pain located?\"      Right knee    3. PAIN: \"How bad is the pain?\"    (Scale 1-10; or mild, moderate, severe)    -  MILD (1-3): doesn't interfere with normal activities     -  MODERATE (4-7): interferes with normal activities (e.g., work or school) or awakens from sleep, limping     -  SEVERE (8-10): excruciating pain, unable to do any normal activities, unable to walk      9/10    4. WORK OR EXERCISE: \"Has there been any recent work or exercise that involved this part of the body?\"       Denies    5. CAUSE: \"What do you think is causing the leg pain?\"     Unknown     6. OTHER SYMPTOMS: \"Do you have any other symptoms?\" (e.g., chest pain, back pain, breathing difficulty, swelling, rash, fever, numbness, weakness)  Right knee is locked and having difficulty walking due to the pain and locking    7. PREGNANCY: \"Is there any chance you are pregnant?\" \"When was your last menstrual period?\"      denies    Celebrex 200 mg This morning with no relief in pain  Gabapentin 300 mg this morning and this afternoon with no relief in pain    ED disposition advised. Patient agreeable. Patient is going to wait till her  gets home at 1900 and go to [facility] for evaluation.    Protocols used: Leg Pain-ADULT-    "

## 2024-09-06 ENCOUNTER — PATIENT MESSAGE (OUTPATIENT)
Dept: FAMILY MEDICINE CLINIC | Facility: CLINIC | Age: 34
End: 2024-09-06

## 2024-09-06 ENCOUNTER — TELEPHONE (OUTPATIENT)
Dept: FAMILY MEDICINE CLINIC | Facility: CLINIC | Age: 34
End: 2024-09-06

## 2024-09-06 DIAGNOSIS — E66.01 OBESITY, MORBID, BMI 50 OR HIGHER (HCC): ICD-10-CM

## 2024-09-06 DIAGNOSIS — M79.7 FIBROMYALGIA: Primary | ICD-10-CM

## 2024-09-06 DIAGNOSIS — M25.561 ACUTE PAIN OF RIGHT KNEE: Primary | ICD-10-CM

## 2024-09-06 LAB

## 2024-09-06 RX ORDER — ACETAMINOPHEN 500 MG
1000 TABLET ORAL EVERY 6 HOURS PRN
Qty: 50 TABLET | Refills: 2 | Status: SHIPPED | OUTPATIENT
Start: 2024-09-06

## 2024-09-06 NOTE — TELEPHONE ENCOUNTER
Called and spoke with patient, advised that a wheelchair would not be available for  today they would ship it to her, patient reported that she spoke with [facility] in [place] and they told her that if dr office sent over script for wheelchair, insurance information, and why patient needed the wheelchair she could pick it up. Advised patient id call [facility] and follow up.  Called [facility] and spoke with Kristal, Kristal advised me that it would be faster to submit online via parachute  for intake process to be more efficient, patient would be unable to  today, earliest delivery would be Monday or Tuesday.  I called patient back w/ [facility] on the line to follow up w/ patient. Kristal from [facility] apologized for wrong information she was previously given on being able to  today, Patient reports she will wait for the delivery but she needs it asap for dr whiting.   I thank [facility] and advised I would place parachute order for wheelchair for fast service for patient.     Order placed through parachute for wheelchair.

## 2024-09-06 NOTE — TELEPHONE ENCOUNTER
Patient calling to report was seen yesterday at  Emergency room. Pt states had Xray done and results showed no fracture. Pt states still having pain in knee and unable to bare weight on it. Pt states she was Discharged with no pain medication and is in a lot of pain. Pt requesting further recommendations.      Please advise.  Thank you

## 2024-09-10 LAB
